# Patient Record
Sex: FEMALE | Race: WHITE | Employment: FULL TIME | URBAN - NONMETROPOLITAN AREA
[De-identification: names, ages, dates, MRNs, and addresses within clinical notes are randomized per-mention and may not be internally consistent; named-entity substitution may affect disease eponyms.]

---

## 2017-04-17 ENCOUNTER — TELEPHONE (OUTPATIENT)
Dept: PRIMARY CARE CLINIC | Age: 52
End: 2017-04-17

## 2017-04-17 RX ORDER — CITALOPRAM 20 MG/1
TABLET ORAL
Qty: 120 TABLET | Refills: 1 | Status: CANCELLED | OUTPATIENT
Start: 2017-04-17

## 2017-04-17 RX ORDER — CITALOPRAM 20 MG/1
TABLET ORAL
Qty: 120 TABLET | Refills: 1 | Status: SHIPPED | OUTPATIENT
Start: 2017-04-17 | End: 2017-08-26 | Stop reason: SDUPTHER

## 2017-04-26 ENCOUNTER — OFFICE VISIT (OUTPATIENT)
Dept: PRIMARY CARE CLINIC | Age: 52
End: 2017-04-26
Payer: COMMERCIAL

## 2017-04-26 ENCOUNTER — HOSPITAL ENCOUNTER (OUTPATIENT)
Age: 52
Setting detail: SPECIMEN
Discharge: HOME | End: 2017-04-26
Payer: COMMERCIAL

## 2017-04-26 ENCOUNTER — TELEPHONE (OUTPATIENT)
Dept: PRIMARY CARE CLINIC | Age: 52
End: 2017-04-26

## 2017-04-26 VITALS
RESPIRATION RATE: 18 BRPM | TEMPERATURE: 97.5 F | SYSTOLIC BLOOD PRESSURE: 104 MMHG | OXYGEN SATURATION: 92 % | DIASTOLIC BLOOD PRESSURE: 82 MMHG | HEIGHT: 67 IN | BODY MASS INDEX: 44.1 KG/M2 | WEIGHT: 281 LBS | HEART RATE: 72 BPM

## 2017-04-26 DIAGNOSIS — Z00.00 PHYSICAL EXAM: Primary | ICD-10-CM

## 2017-04-26 DIAGNOSIS — Z13.9 SCREENING: ICD-10-CM

## 2017-04-26 DIAGNOSIS — Z00.00 PHYSICAL EXAM: ICD-10-CM

## 2017-04-26 DIAGNOSIS — Z13.220 SCREENING, LIPID: ICD-10-CM

## 2017-04-26 DIAGNOSIS — L98.8 SKIN LESION OF BREAST: ICD-10-CM

## 2017-04-26 DIAGNOSIS — Z12.39 SCREENING FOR MALIGNANT NEOPLASM OF BREAST: ICD-10-CM

## 2017-04-26 DIAGNOSIS — Z12.4 PAP SMEAR FOR CERVICAL CANCER SCREENING: ICD-10-CM

## 2017-04-26 DIAGNOSIS — E55.9 VITAMIN D DEFICIENCY: ICD-10-CM

## 2017-04-26 LAB
ALBUMIN SERPL-MCNC: 4.1 G/DL (ref 3.5–5.2)
ALP BLD-CCNC: 59 U/L (ref 35–104)
ALT SERPL-CCNC: 59 U/L (ref 5–33)
ANION GAP SERPL CALCULATED.3IONS-SCNC: 16 MMOL/L (ref 7–19)
AST SERPL-CCNC: 50 U/L (ref 5–32)
BILIRUB SERPL-MCNC: 0.6 MG/DL (ref 0.2–1.2)
BUN BLDV-MCNC: 10 MG/DL (ref 6–20)
CALCIUM SERPL-MCNC: 9.1 MG/DL (ref 8.6–10)
CHLORIDE BLD-SCNC: 99 MMOL/L (ref 98–111)
CHOLESTEROL, TOTAL: 151 MG/DL (ref 160–199)
CO2: 23 MMOL/L (ref 22–29)
CREAT SERPL-MCNC: 0.7 MG/DL (ref 0.5–0.9)
GFR NON-AFRICAN AMERICAN: >60
GLOBULIN: 3.6 G/DL
GLUCOSE BLD-MCNC: 152 MG/DL (ref 74–109)
HBA1C MFR BLD: 7.2 %
HCT VFR BLD CALC: 42.3 % (ref 37–47)
HDLC SERPL-MCNC: 55 MG/DL (ref 65–121)
HEMOGLOBIN: 13.6 G/DL (ref 12–16)
LDL CHOLESTEROL CALCULATED: 71 MG/DL
MCH RBC QN AUTO: 27.5 PG (ref 27–31)
MCHC RBC AUTO-ENTMCNC: 32.2 G/DL (ref 33–37)
MCV RBC AUTO: 85.6 FL (ref 81–99)
PDW BLD-RTO: 13.2 % (ref 11.5–14.5)
PLATELET # BLD: 248 K/UL (ref 130–400)
PMV BLD AUTO: 10.6 FL (ref 7.4–10.4)
POTASSIUM SERPL-SCNC: 4 MMOL/L (ref 3.5–5)
RAPID HIV 1&2: NORMAL
RBC # BLD: 4.94 M/UL (ref 4.2–5.4)
SODIUM BLD-SCNC: 138 MMOL/L (ref 136–145)
TOTAL PROTEIN: 7.7 G/DL (ref 6.6–8.7)
TRIGL SERPL-MCNC: 127 MG/DL (ref 150–199)
TSH SERPL DL<=0.05 MIU/L-ACNC: 2.99 UIU/ML (ref 0.27–4.2)
VITAMIN B-12: 310 PG/ML (ref 211–946)
VITAMIN D 25-HYDROXY: 32.3 NG/ML
WBC # BLD: 7.6 K/UL (ref 4.8–10.8)

## 2017-04-26 PROCEDURE — 88142 CYTOPATH C/V THIN LAYER: CPT

## 2017-04-26 PROCEDURE — 99396 PREV VISIT EST AGE 40-64: CPT | Performed by: NURSE PRACTITIONER

## 2017-04-26 RX ORDER — FUROSEMIDE 20 MG/1
10 TABLET ORAL DAILY
Qty: 30 TABLET | Refills: 1 | Status: SHIPPED | OUTPATIENT
Start: 2017-04-26 | End: 2019-05-29 | Stop reason: SDUPTHER

## 2017-04-26 ASSESSMENT — ENCOUNTER SYMPTOMS
ALLERGIC/IMMUNOLOGIC NEGATIVE: 1
RESPIRATORY NEGATIVE: 1
EYES NEGATIVE: 1
GASTROINTESTINAL NEGATIVE: 1

## 2017-05-01 ENCOUNTER — TELEPHONE (OUTPATIENT)
Dept: PRIMARY CARE CLINIC | Age: 52
End: 2017-05-01

## 2017-05-02 ENCOUNTER — TELEPHONE (OUTPATIENT)
Dept: PRIMARY CARE CLINIC | Age: 52
End: 2017-05-02

## 2017-05-02 DIAGNOSIS — Z01.419 WOMEN'S ANNUAL ROUTINE GYNECOLOGICAL EXAMINATION: Primary | ICD-10-CM

## 2017-05-08 ENCOUNTER — HOSPITAL ENCOUNTER (OUTPATIENT)
Dept: MRI IMAGING | Age: 52
Discharge: HOME | End: 2017-05-08
Payer: COMMERCIAL

## 2017-05-08 DIAGNOSIS — M48.02 CERVICAL SPINAL STENOSIS: ICD-10-CM

## 2017-05-08 DIAGNOSIS — M54.2 CERVICALGIA: ICD-10-CM

## 2017-05-08 DIAGNOSIS — M54.12 RADICULOPATHY, CERVICAL: ICD-10-CM

## 2017-05-08 PROCEDURE — 72141 MRI NECK SPINE W/O DYE: CPT

## 2017-06-19 ENCOUNTER — OFFICE VISIT (OUTPATIENT)
Dept: OBGYN | Age: 52
End: 2017-06-19
Payer: COMMERCIAL

## 2017-06-19 VITALS
DIASTOLIC BLOOD PRESSURE: 83 MMHG | WEIGHT: 280 LBS | SYSTOLIC BLOOD PRESSURE: 122 MMHG | HEIGHT: 67 IN | BODY MASS INDEX: 43.95 KG/M2

## 2017-06-19 DIAGNOSIS — Z12.4 SCREENING FOR CERVICAL CANCER: ICD-10-CM

## 2017-06-19 DIAGNOSIS — N92.6 IRREGULAR BLEEDING: ICD-10-CM

## 2017-06-19 DIAGNOSIS — R87.615 ENCOUNTER FOR REPEAT PAP SMEAR DUE TO PREVIOUS INSUFFICIENT CERVICAL CELLS: Primary | ICD-10-CM

## 2017-06-19 DIAGNOSIS — R10.2 PELVIC PAIN IN FEMALE: ICD-10-CM

## 2017-06-19 PROCEDURE — 99214 OFFICE O/P EST MOD 30 MIN: CPT | Performed by: NURSE PRACTITIONER

## 2017-06-19 ASSESSMENT — ENCOUNTER SYMPTOMS
RESPIRATORY NEGATIVE: 1
GASTROINTESTINAL NEGATIVE: 1
EYES NEGATIVE: 1

## 2017-06-28 ENCOUNTER — TELEPHONE (OUTPATIENT)
Dept: OBGYN | Age: 52
End: 2017-06-28

## 2017-07-11 ENCOUNTER — PROCEDURE VISIT (OUTPATIENT)
Dept: OBGYN | Age: 52
End: 2017-07-11
Payer: COMMERCIAL

## 2017-07-11 VITALS
HEART RATE: 103 BPM | DIASTOLIC BLOOD PRESSURE: 84 MMHG | WEIGHT: 281 LBS | TEMPERATURE: 98.2 F | SYSTOLIC BLOOD PRESSURE: 154 MMHG | BODY MASS INDEX: 44.1 KG/M2 | HEIGHT: 67 IN

## 2017-07-11 DIAGNOSIS — R87.810 ASCUS WITH POSITIVE HIGH RISK HPV CERVICAL: Primary | ICD-10-CM

## 2017-07-11 DIAGNOSIS — R10.2 PELVIC PAIN IN FEMALE: ICD-10-CM

## 2017-07-11 DIAGNOSIS — N92.6 IRREGULAR BLEEDING: ICD-10-CM

## 2017-07-11 DIAGNOSIS — R87.610 ASCUS WITH POSITIVE HIGH RISK HPV CERVICAL: Primary | ICD-10-CM

## 2017-07-11 PROCEDURE — 57454 BX/CURETT OF CERVIX W/SCOPE: CPT | Performed by: OBSTETRICS & GYNECOLOGY

## 2017-07-31 ENCOUNTER — TELEPHONE (OUTPATIENT)
Dept: OBGYN | Age: 52
End: 2017-07-31

## 2017-08-08 ENCOUNTER — OFFICE VISIT (OUTPATIENT)
Dept: OBGYN | Age: 52
End: 2017-08-08
Payer: COMMERCIAL

## 2017-08-08 VITALS — BODY MASS INDEX: 42.85 KG/M2 | HEIGHT: 67 IN | WEIGHT: 273 LBS

## 2017-08-08 DIAGNOSIS — N89.8 VAGINAL ODOR: ICD-10-CM

## 2017-08-08 DIAGNOSIS — N89.8 VAGINAL DISCHARGE: Primary | ICD-10-CM

## 2017-08-08 PROCEDURE — 99213 OFFICE O/P EST LOW 20 MIN: CPT | Performed by: NURSE PRACTITIONER

## 2017-08-08 RX ORDER — METRONIDAZOLE 500 MG/1
500 TABLET ORAL 2 TIMES DAILY WITH MEALS
Qty: 14 TABLET | Refills: 0 | Status: SHIPPED | OUTPATIENT
Start: 2017-08-08 | End: 2017-08-15

## 2017-08-08 ASSESSMENT — ENCOUNTER SYMPTOMS
EYES NEGATIVE: 1
RESPIRATORY NEGATIVE: 1
GASTROINTESTINAL NEGATIVE: 1

## 2017-10-12 DIAGNOSIS — I10 ESSENTIAL HYPERTENSION: Primary | ICD-10-CM

## 2017-10-12 RX ORDER — LISINOPRIL AND HYDROCHLOROTHIAZIDE 12.5; 1 MG/1; MG/1
TABLET ORAL
Qty: 90 TABLET | Refills: 3 | Status: SHIPPED | OUTPATIENT
Start: 2017-10-12 | End: 2018-10-29 | Stop reason: DRUGHIGH

## 2017-10-12 NOTE — TELEPHONE ENCOUNTER
From: Mgaalys [place]  Sent: 10/12/2017 4:51 PM CDT  Subject: Medication Renewal Request    Jauna Williamson would like a refill of the following medications:  lisinopril-hydrochlorothiazide (PRINZIDE;ZESTORETIC) 10-12.5 MG per tablet JEN Greer]    Preferred pharmacy: [address] [place] [address] [phone] - f [phone]    Comment:  my last pill was today and I called [facility] and there are no refills, can you please authorize ASAP? thank you much.  Their # is [phone]

## 2018-06-20 ENCOUNTER — OFFICE VISIT (OUTPATIENT)
Dept: OBGYN | Age: 53
End: 2018-06-20
Payer: COMMERCIAL

## 2018-06-20 VITALS
DIASTOLIC BLOOD PRESSURE: 83 MMHG | SYSTOLIC BLOOD PRESSURE: 127 MMHG | WEIGHT: 273 LBS | HEART RATE: 85 BPM | HEIGHT: 67 IN | BODY MASS INDEX: 42.85 KG/M2

## 2018-06-20 DIAGNOSIS — N92.0 MENORRHAGIA WITH REGULAR CYCLE: ICD-10-CM

## 2018-06-20 DIAGNOSIS — Z12.4 SCREENING FOR CERVICAL CANCER: ICD-10-CM

## 2018-06-20 DIAGNOSIS — Z13.9 ENCOUNTER FOR MEDICAL SCREENING EXAMINATION: ICD-10-CM

## 2018-06-20 DIAGNOSIS — Z11.51 SCREENING FOR HPV (HUMAN PAPILLOMAVIRUS): ICD-10-CM

## 2018-06-20 DIAGNOSIS — Z01.419 WELL WOMAN EXAM WITH ROUTINE GYNECOLOGICAL EXAM: Primary | ICD-10-CM

## 2018-06-20 DIAGNOSIS — Z12.31 ENCOUNTER FOR SCREENING MAMMOGRAM FOR BREAST CANCER: ICD-10-CM

## 2018-06-20 PROCEDURE — 99396 PREV VISIT EST AGE 40-64: CPT | Performed by: NURSE PRACTITIONER

## 2018-06-20 RX ORDER — ACETAMINOPHEN AND CODEINE PHOSPHATE 120; 12 MG/5ML; MG/5ML
1 SOLUTION ORAL DAILY
Qty: 28 TABLET | Refills: 5 | Status: SHIPPED | OUTPATIENT
Start: 2018-06-20 | End: 2018-09-27

## 2018-06-20 ASSESSMENT — ENCOUNTER SYMPTOMS
EYES NEGATIVE: 1
GASTROINTESTINAL NEGATIVE: 1
RESPIRATORY NEGATIVE: 1

## 2018-07-02 ENCOUNTER — TELEPHONE (OUTPATIENT)
Dept: OBGYN | Age: 53
End: 2018-07-02

## 2018-09-17 ENCOUNTER — TELEPHONE (OUTPATIENT)
Dept: OBGYN | Age: 53
End: 2018-09-17

## 2018-09-17 RX ORDER — CITALOPRAM 20 MG/1
20 TABLET ORAL DAILY
Qty: 30 TABLET | Refills: 0 | Status: SHIPPED | OUTPATIENT
Start: 2018-09-17 | End: 2018-10-10 | Stop reason: SDUPTHER

## 2018-09-17 NOTE — TELEPHONE ENCOUNTER
Pt scheduled for colpo and one month of Celexa sent in until approval from Dr. Kimberly Carroll if she wants to refill.

## 2018-09-17 NOTE — TELEPHONE ENCOUNTER
I tried calling patient and left VM for patient to call me back. Our office has tried to reach out to her a couple times and the patient never got back with us, since July.

## 2018-09-27 ENCOUNTER — PROCEDURE VISIT (OUTPATIENT)
Dept: OBGYN | Age: 53
End: 2018-09-27
Payer: COMMERCIAL

## 2018-09-27 VITALS
HEIGHT: 67 IN | SYSTOLIC BLOOD PRESSURE: 124 MMHG | WEIGHT: 275 LBS | DIASTOLIC BLOOD PRESSURE: 88 MMHG | BODY MASS INDEX: 43.16 KG/M2

## 2018-09-27 DIAGNOSIS — R87.810 ASCUS WITH POSITIVE HIGH RISK HPV CERVICAL: Primary | ICD-10-CM

## 2018-09-27 DIAGNOSIS — Z01.818 PREPROCEDURAL EXAMINATION: ICD-10-CM

## 2018-09-27 DIAGNOSIS — R87.610 ASCUS WITH POSITIVE HIGH RISK HPV CERVICAL: Primary | ICD-10-CM

## 2018-09-27 LAB
CONTROL: NORMAL
PREGNANCY TEST URINE, POC: NORMAL

## 2018-09-27 PROCEDURE — 81025 URINE PREGNANCY TEST: CPT | Performed by: OBSTETRICS & GYNECOLOGY

## 2018-09-27 PROCEDURE — 57456 ENDOCERV CURETTAGE W/SCOPE: CPT | Performed by: OBSTETRICS & GYNECOLOGY

## 2018-09-27 NOTE — PROGRESS NOTES
Ander Santana is a 47 y.o. who presents for colposcopy. /88   Ht 5' 7\" (1.702 m)   Wt 275 lb (124.7 kg)   LMP 04/23/2018   BMI 43.07 kg/m²       Colposcopy Procedure Note    Indications: Pap smear 1 months ago showed: ASCUS with POSITIVE high risk HPV. The prior pap showed ASCUS with POSITIVE high risk HPV. Prior cervical/vaginal disease: normal exam without visible pathology. Prior cervical treatment: no treatment. Procedure Details   The risks and benefits of the procedure and Written informed consent obtained. Speculum placed in vagina and excellent visualization of cervix achieved, cervix swabbed x 3 with acetic acid solution. Findings:  Cervix: no visible lesions; endocervical curettage performed. Vaginal inspection: normal without visible lesions. Vulvar colposcopy: vulvar colposcopy not performed. Specimens: ECC    Complications: none. Plan:  1. Specimens labelled and sent to Pathology. 2. Will base further treatment on Pathology findings. 3. Nothing in vagina x 48 hrs  4. Will notify of results. I Santiago Beltran, am scribing for and in the presence of Dr. Zander Brandon 7/30/026150:11 AM.    I, Dr. Zander Brandon, personally performed the services described in this documentation as scribed by Santiago Beltran in my presence, and it is both accurate and complete.

## 2018-09-27 NOTE — PATIENT INSTRUCTIONS
sure to make and go to all appointments, and call your doctor if you are having problems. It's also a good idea to know your test results and keep a list of the medicines you take. When should you call for help? Call your doctor now or seek immediate medical care if:    · You have severe vaginal bleeding. This means that you are soaking through your usual pads or tampons each hour for 2 or more hours.     · You have pain that does not get better after you take pain medicine.     · You have signs of infection, such as:  ¨ Increased pain. ¨ Bad-smelling vaginal discharge. ¨ A fever.    Watch closely for any changes in your health, and be sure to contact your doctor if:    · You have questions or concerns. Where can you learn more? Go to https://[employer].Cartour. org and sign in to your Bring[employer] account. Enter M523 in the [employer]list box to learn more about \"Colposcopy: What to Expect at Home. \"     If you do not have an account, please click on the \"Sign Up Now\" link. Current as of: May 12, 2017  Content Version: 11.7  © 9009-3964 [employer]. Care instructions adapted under license by [facility] [place] ([facility]). If you have questions about a medical condition or this instruction, always ask your healthcare professional. [address] any warranty or liability for your use of this information.

## 2018-10-03 ENCOUNTER — TELEPHONE (OUTPATIENT)
Dept: OBGYN | Age: 53
End: 2018-10-03

## 2018-10-10 RX ORDER — CITALOPRAM 20 MG/1
20 TABLET ORAL DAILY
Qty: 90 TABLET | Refills: 3 | Status: SHIPPED | OUTPATIENT
Start: 2018-10-10 | End: 2018-10-29 | Stop reason: SDUPTHER

## 2018-10-26 ENCOUNTER — LAB (OUTPATIENT)
Dept: LAB | Facility: HOSPITAL | Age: 53
End: 2018-10-26

## 2018-10-26 ENCOUNTER — HOSPITAL ENCOUNTER (OUTPATIENT)
Dept: CT IMAGING | Facility: HOSPITAL | Age: 53
Discharge: HOME | End: 2018-10-26
Admitting: NURSE PRACTITIONER

## 2018-10-26 ENCOUNTER — TRANSCRIBE ORDERS (OUTPATIENT)
Dept: ADMINISTRATIVE | Facility: HOSPITAL | Age: 53
End: 2018-10-26

## 2018-10-26 DIAGNOSIS — R42 DIZZINESS AND GIDDINESS: Primary | ICD-10-CM

## 2018-10-26 DIAGNOSIS — R42 DIZZINESS AND GIDDINESS: ICD-10-CM

## 2018-10-26 LAB — GLUCOSE BLD-MCNC: 240 MG/DL (ref 70–100)

## 2018-10-26 PROCEDURE — 36415 COLL VENOUS BLD VENIPUNCTURE: CPT

## 2018-10-26 PROCEDURE — 70450 CT HEAD/BRAIN W/O DYE: CPT

## 2018-10-26 PROCEDURE — 82947 ASSAY GLUCOSE BLOOD QUANT: CPT

## 2018-10-29 ENCOUNTER — TELEPHONE (OUTPATIENT)
Dept: PRIMARY CARE CLINIC | Age: 53
End: 2018-10-29

## 2018-10-29 ENCOUNTER — OFFICE VISIT (OUTPATIENT)
Dept: PRIMARY CARE CLINIC | Age: 53
End: 2018-10-29
Payer: COMMERCIAL

## 2018-10-29 VITALS
HEART RATE: 92 BPM | SYSTOLIC BLOOD PRESSURE: 126 MMHG | HEIGHT: 67 IN | BODY MASS INDEX: 42.38 KG/M2 | WEIGHT: 270 LBS | TEMPERATURE: 98.3 F | OXYGEN SATURATION: 99 % | DIASTOLIC BLOOD PRESSURE: 78 MMHG

## 2018-10-29 DIAGNOSIS — E66.01 MORBID OBESITY WITH BMI OF 40.0-44.9, ADULT (HCC): Chronic | ICD-10-CM

## 2018-10-29 DIAGNOSIS — E11.9 ENCOUNTER FOR DIABETIC FOOT EXAM (HCC): ICD-10-CM

## 2018-10-29 DIAGNOSIS — E11.65 UNCONTROLLED TYPE 2 DIABETES MELLITUS WITH HYPERGLYCEMIA (HCC): Primary | Chronic | ICD-10-CM

## 2018-10-29 DIAGNOSIS — Z13.31 POSITIVE DEPRESSION SCREENING: ICD-10-CM

## 2018-10-29 DIAGNOSIS — G47.33 OSA (OBSTRUCTIVE SLEEP APNEA): Chronic | ICD-10-CM

## 2018-10-29 DIAGNOSIS — Z76.0 MEDICATION REFILL: ICD-10-CM

## 2018-10-29 DIAGNOSIS — Z23 NEED FOR INFLUENZA VACCINATION: ICD-10-CM

## 2018-10-29 DIAGNOSIS — F41.8 DEPRESSION WITH ANXIETY: ICD-10-CM

## 2018-10-29 LAB — HBA1C MFR BLD: 10.6 %

## 2018-10-29 PROCEDURE — G8427 DOCREV CUR MEDS BY ELIG CLIN: HCPCS | Performed by: NURSE PRACTITIONER

## 2018-10-29 PROCEDURE — G8417 CALC BMI ABV UP PARAM F/U: HCPCS | Performed by: NURSE PRACTITIONER

## 2018-10-29 PROCEDURE — G8484 FLU IMMUNIZE NO ADMIN: HCPCS | Performed by: NURSE PRACTITIONER

## 2018-10-29 PROCEDURE — 99214 OFFICE O/P EST MOD 30 MIN: CPT | Performed by: NURSE PRACTITIONER

## 2018-10-29 PROCEDURE — 2022F DILAT RTA XM EVC RTNOPTHY: CPT | Performed by: NURSE PRACTITIONER

## 2018-10-29 PROCEDURE — 3046F HEMOGLOBIN A1C LEVEL >9.0%: CPT | Performed by: NURSE PRACTITIONER

## 2018-10-29 PROCEDURE — 3017F COLORECTAL CA SCREEN DOC REV: CPT | Performed by: NURSE PRACTITIONER

## 2018-10-29 PROCEDURE — 1036F TOBACCO NON-USER: CPT | Performed by: NURSE PRACTITIONER

## 2018-10-29 PROCEDURE — 83036 HEMOGLOBIN GLYCOSYLATED A1C: CPT | Performed by: NURSE PRACTITIONER

## 2018-10-29 PROCEDURE — G8431 POS CLIN DEPRES SCRN F/U DOC: HCPCS | Performed by: NURSE PRACTITIONER

## 2018-10-29 RX ORDER — MECLIZINE HYDROCHLORIDE 25 MG/1
TABLET ORAL
COMMUNITY
End: 2020-05-19

## 2018-10-29 RX ORDER — CITALOPRAM 20 MG/1
20 TABLET ORAL DAILY
Qty: 90 TABLET | Refills: 3 | Status: SHIPPED | OUTPATIENT
Start: 2018-10-29 | End: 2020-01-24 | Stop reason: SDUPTHER

## 2018-10-29 ASSESSMENT — ENCOUNTER SYMPTOMS
COUGH: 0
VOMITING: 0
ABDOMINAL PAIN: 0
CONSTIPATION: 0
COLOR CHANGE: 0
APNEA: 0
ABDOMINAL DISTENTION: 0
CHEST TIGHTNESS: 0
WHEEZING: 0
NAUSEA: 0
SHORTNESS OF BREATH: 0
DIARRHEA: 0
BACK PAIN: 0

## 2018-10-29 ASSESSMENT — PATIENT HEALTH QUESTIONNAIRE - PHQ9
9. THOUGHTS THAT YOU WOULD BE BETTER OFF DEAD, OR OF HURTING YOURSELF: 0
3. TROUBLE FALLING OR STAYING ASLEEP: 3
10. IF YOU CHECKED OFF ANY PROBLEMS, HOW DIFFICULT HAVE THESE PROBLEMS MADE IT FOR YOU TO DO YOUR WORK, TAKE CARE OF THINGS AT HOME, OR GET ALONG WITH OTHER PEOPLE: 1
SUM OF ALL RESPONSES TO PHQ9 QUESTIONS 1 & 2: 1
5. POOR APPETITE OR OVEREATING: 0
4. FEELING TIRED OR HAVING LITTLE ENERGY: 3
SUM OF ALL RESPONSES TO PHQ QUESTIONS 1-9: 7
SUM OF ALL RESPONSES TO PHQ QUESTIONS 1-9: 7
2. FEELING DOWN, DEPRESSED OR HOPELESS: 0
6. FEELING BAD ABOUT YOURSELF - OR THAT YOU ARE A FAILURE OR HAVE LET YOURSELF OR YOUR FAMILY DOWN: 0
1. LITTLE INTEREST OR PLEASURE IN DOING THINGS: 1
7. TROUBLE CONCENTRATING ON THINGS, SUCH AS READING THE NEWSPAPER OR WATCHING TELEVISION: 0
8. MOVING OR SPEAKING SO SLOWLY THAT OTHER PEOPLE COULD HAVE NOTICED. OR THE OPPOSITE, BEING SO FIGETY OR RESTLESS THAT YOU HAVE BEEN MOVING AROUND A LOT MORE THAN USUAL: 0

## 2018-10-29 NOTE — PROGRESS NOTES
10/29/2018     Shakir Georges (:  1965) is a 46 y.o. female, here for evaluation of the following medical concerns:  Chief Complaint   Patient presents with    Diabetes     Possible diabetes per Dr. Laban Barthel office over the weekend. Lab for Glucose at [facility] and not fasting. 240    Insomnia     Patient feels that she may have sleep apnea    Foot Swelling     Feet are swollen most everyday. HPI  Patient presents to the office to follow-up on recent diagnoses of type II diabetes. She was seen at a local urgent care and based on her blood glucose was started on metformin 500 mg daily. Her hemoglobin A1c here in the office today is 10.6. Patient has been taking her metformin is still getting blood glucose readings in the 250 range. I have bumped her metformin but I'm suggesting she start Ozempic with a gradual increase up to 1 mg daily. The patient has been having polyuria and polydipsia and polyphagia has not had any significant weight loss. She does have ongoing fatigue but states that she does snore, wakes up with her heart racing feels like she has not slept when waking in the morning and has other signs of obstructive sleep apnea. I am recommending a home sleep study. Agent does have a glucometer and is testing her blood sugar 3 times a day. She was given information on self management for diabetes and has pending labs that she will have completed to rule out thyroid disorders as well. Review of Systems   Constitutional: Positive for appetite change and fatigue. Negative for fever and unexpected weight change. Eyes: Positive for visual disturbance. She has had some mildly blurred vision   Respiratory: Negative for apnea, cough, chest tightness, shortness of breath and wheezing. Cardiovascular: Negative for chest pain, palpitations and leg swelling. Gastrointestinal: Negative for abdominal distention, abdominal pain, constipation, diarrhea, nausea and vomiting.    Endocrine: influenza vaccination  -     Cancel: INFLUENZA, QUADV, 3 YRS AND OLDER, IM, PF, PREFILL SYR OR SDV, 0.5ML (FLUZONE QUADV, PF)    Positive depression screening  -     Positive Screen for Clinical Depression with a Documented Follow-up Plan     Medication refill    Encounter for diabetic foot exam ([facility] [address]    Other orders  -     POCT glycosylated hemoglobin (Hb A1C)  -     Diabetic Foot Exam  -     Semaglutide 1 MG/DOSE SOPN; Inject 1 mg into the skin once a week  -     metFORMIN (GLUCOPHAGE) 500 MG tablet; Take 2 tablets by mouth 2 times daily (with meals)      ADULT DIABETES GENERAL HOME CARE DISCHARGE INSTRUCTIONS    Remember: YOU are the marks to successfully manage your diabetes      ? USUAL BLOOD GLUCOSE TARGETS:    Before meals: 90-130mg/dL, 2 hrs after meals: 140-180 mg/dL, before bed 110-150 mg/dL      Test your blood glucose every day, ideally before meals and at bedtime or as directed by your health care provider  Take your diabetes medication or insulin as prescribed. It is important to take medication or insulin on time to prevent hypoglycemia (low blood glucose)  Carry some form of carbohydrate (sugar) with you at all times to treat hypoglycemia. Examples include: 3 glucose tablets, ½ cup of juice, ½ cup soda, glucose gel (entire tube or packet), or 5 to 6 LifeSavers. Wear an emergency medical identification bracelet or necklace that indicates you have diabetes  Follow your recommended meal plan. Eat meals and snacks on time to avoid hypoglycemia. If you have further questions about your meal plan, ask your health care provider for a referral to see a registered dietitian  Follow-up with designated providers  Follow-up with your primary care doctor regarding your diabetes control  Ask your health care provider for a referral to a diabetes educator, if you have further questions about taking care of your diabetes   Look at your feet, top and bottom every morning.  If you have any signs of infection, such

## 2018-10-29 NOTE — PROGRESS NOTES
On the basis of positive PHQ-9 screening (PHQ-9 Total Score: 7), the following plan was implemented: false positive screen suspected- will re-evaluate at next visit. Patient will follow-up in 2 month(s) with PCP.

## 2018-10-31 ENCOUNTER — TELEPHONE (OUTPATIENT)
Dept: PRIMARY CARE CLINIC | Age: 53
End: 2018-10-31

## 2018-11-02 NOTE — TELEPHONE ENCOUNTER
Spoke with patient again yesterday afternoon and she was feeling better. Per Jero Vicente, JEN Blood Sugar could be cause of dizziness because it is elevated. Patient voice understanding and agreed. She will call with any further problems.

## 2018-12-07 RX ORDER — LISINOPRIL AND HYDROCHLOROTHIAZIDE 12.5; 1 MG/1; MG/1
TABLET ORAL
Qty: 90 TABLET | Refills: 3 | Status: SHIPPED | OUTPATIENT
Start: 2018-12-07 | End: 2019-05-29 | Stop reason: SDUPTHER

## 2018-12-10 ENCOUNTER — TELEPHONE (OUTPATIENT)
Dept: FAMILY MEDICINE CLINIC | Age: 53
End: 2018-12-10

## 2019-01-09 ENCOUNTER — OFFICE VISIT (OUTPATIENT)
Dept: PRIMARY CARE CLINIC | Age: 54
End: 2019-01-09
Payer: COMMERCIAL

## 2019-01-09 VITALS
DIASTOLIC BLOOD PRESSURE: 70 MMHG | HEIGHT: 67 IN | WEIGHT: 259 LBS | HEART RATE: 80 BPM | OXYGEN SATURATION: 98 % | TEMPERATURE: 98.4 F | SYSTOLIC BLOOD PRESSURE: 122 MMHG | BODY MASS INDEX: 40.65 KG/M2

## 2019-01-09 DIAGNOSIS — E11.9 WELL CONTROLLED DIABETES MELLITUS (HCC): Primary | Chronic | ICD-10-CM

## 2019-01-09 DIAGNOSIS — G47.33 OSA ON CPAP: Chronic | ICD-10-CM

## 2019-01-09 DIAGNOSIS — Z13.220 SCREENING CHOLESTEROL LEVEL: ICD-10-CM

## 2019-01-09 DIAGNOSIS — I10 WELL-CONTROLLED HYPERTENSION: ICD-10-CM

## 2019-01-09 DIAGNOSIS — Z99.89 OSA ON CPAP: Chronic | ICD-10-CM

## 2019-01-09 LAB — HBA1C MFR BLD: 6.9 %

## 2019-01-09 PROCEDURE — 83036 HEMOGLOBIN GLYCOSYLATED A1C: CPT | Performed by: NURSE PRACTITIONER

## 2019-01-09 PROCEDURE — 2022F DILAT RTA XM EVC RTNOPTHY: CPT | Performed by: NURSE PRACTITIONER

## 2019-01-09 PROCEDURE — 3044F HG A1C LEVEL LT 7.0%: CPT | Performed by: NURSE PRACTITIONER

## 2019-01-09 PROCEDURE — 3017F COLORECTAL CA SCREEN DOC REV: CPT | Performed by: NURSE PRACTITIONER

## 2019-01-09 PROCEDURE — G8484 FLU IMMUNIZE NO ADMIN: HCPCS | Performed by: NURSE PRACTITIONER

## 2019-01-09 PROCEDURE — 1036F TOBACCO NON-USER: CPT | Performed by: NURSE PRACTITIONER

## 2019-01-09 PROCEDURE — G8417 CALC BMI ABV UP PARAM F/U: HCPCS | Performed by: NURSE PRACTITIONER

## 2019-01-09 PROCEDURE — G8427 DOCREV CUR MEDS BY ELIG CLIN: HCPCS | Performed by: NURSE PRACTITIONER

## 2019-01-09 PROCEDURE — 99212 OFFICE O/P EST SF 10 MIN: CPT | Performed by: NURSE PRACTITIONER

## 2019-01-12 ASSESSMENT — ENCOUNTER SYMPTOMS
SHORTNESS OF BREATH: 0
APNEA: 0
WHEEZING: 0

## 2019-01-22 DIAGNOSIS — E11.65 UNCONTROLLED TYPE 2 DIABETES MELLITUS WITH HYPERGLYCEMIA (HCC): Chronic | ICD-10-CM

## 2019-04-08 ENCOUNTER — TELEPHONE (OUTPATIENT)
Dept: PRIMARY CARE CLINIC | Age: 54
End: 2019-04-08

## 2019-05-26 DIAGNOSIS — E11.65 UNCONTROLLED TYPE 2 DIABETES MELLITUS WITH HYPERGLYCEMIA (HCC): Chronic | ICD-10-CM

## 2019-05-29 ENCOUNTER — OFFICE VISIT (OUTPATIENT)
Dept: PRIMARY CARE CLINIC | Age: 54
End: 2019-05-29
Payer: COMMERCIAL

## 2019-05-29 VITALS
OXYGEN SATURATION: 98 % | TEMPERATURE: 99 F | WEIGHT: 243 LBS | HEART RATE: 116 BPM | SYSTOLIC BLOOD PRESSURE: 110 MMHG | HEIGHT: 67 IN | DIASTOLIC BLOOD PRESSURE: 72 MMHG | BODY MASS INDEX: 38.14 KG/M2

## 2019-05-29 DIAGNOSIS — R19.7 DIARRHEA, UNSPECIFIED TYPE: Primary | ICD-10-CM

## 2019-05-29 DIAGNOSIS — R19.7 DIARRHEA, UNSPECIFIED TYPE: ICD-10-CM

## 2019-05-29 DIAGNOSIS — E11.9 TYPE 2 DIABETES MELLITUS WITHOUT COMPLICATION, WITHOUT LONG-TERM CURRENT USE OF INSULIN (HCC): ICD-10-CM

## 2019-05-29 DIAGNOSIS — I10 WELL-CONTROLLED HYPERTENSION: ICD-10-CM

## 2019-05-29 LAB
CRYPTOSPORIDIUM ANTIGEN STOOL: NORMAL
GIARDIA ANTIGEN STOOL: NORMAL

## 2019-05-29 PROCEDURE — 99214 OFFICE O/P EST MOD 30 MIN: CPT | Performed by: NURSE PRACTITIONER

## 2019-05-29 PROCEDURE — 1036F TOBACCO NON-USER: CPT | Performed by: NURSE PRACTITIONER

## 2019-05-29 PROCEDURE — 3044F HG A1C LEVEL LT 7.0%: CPT | Performed by: NURSE PRACTITIONER

## 2019-05-29 PROCEDURE — G8417 CALC BMI ABV UP PARAM F/U: HCPCS | Performed by: NURSE PRACTITIONER

## 2019-05-29 PROCEDURE — 83036 HEMOGLOBIN GLYCOSYLATED A1C: CPT | Performed by: NURSE PRACTITIONER

## 2019-05-29 PROCEDURE — G8427 DOCREV CUR MEDS BY ELIG CLIN: HCPCS | Performed by: NURSE PRACTITIONER

## 2019-05-29 PROCEDURE — 3017F COLORECTAL CA SCREEN DOC REV: CPT | Performed by: NURSE PRACTITIONER

## 2019-05-29 PROCEDURE — 2022F DILAT RTA XM EVC RTNOPTHY: CPT | Performed by: NURSE PRACTITIONER

## 2019-05-29 PROCEDURE — 92225 PR SPECIAL EYE EXAM, INITIAL: CPT | Performed by: NURSE PRACTITIONER

## 2019-05-29 RX ORDER — LISINOPRIL AND HYDROCHLOROTHIAZIDE 12.5; 1 MG/1; MG/1
1 TABLET ORAL DAILY
Qty: 90 TABLET | Refills: 5 | Status: SHIPPED | OUTPATIENT
Start: 2019-05-29 | End: 2020-02-14 | Stop reason: SDUPTHER

## 2019-05-29 RX ORDER — FUROSEMIDE 20 MG/1
10 TABLET ORAL DAILY
Qty: 90 TABLET | Refills: 5 | Status: SHIPPED | OUTPATIENT
Start: 2019-05-29 | End: 2019-08-02 | Stop reason: SDUPTHER

## 2019-05-29 NOTE — PROGRESS NOTES
2019     See Peñaloza (:  1965) is a 46 y.o. female, here for evaluation of the following medical concerns:  Chief Complaint   Patient presents with    Diarrhea     Diarrhea is like water x's 3 weeks    GI Problem    Diabetes     insurance will cover Bydureon and Trulicity      HPI  Diabetes Mellitus Type 2: Current symptoms/problems include none. Medication compliance:  compliant all of the time  Diabetic diet compliance:  compliant all of the time,  Weight trend: stable  Current exercise: walks  Quite often no actual pain or exercise program   Home blood sugar records: fasting range: 100-120  Any episodes of hypoglycemia? no  Eye exam current (within one year): no   reports that she has quit smoking. She has never used smokeless tobacco.   Daily Aspirin? Yes  Hypertension  Patient is here for follow-up of elevated blood pressure. She is adherent to a low-salt diet. Blood pressure is well controlled at home. Cardiac symptoms: none. Patient denies chest pain, fatigue and syncope. Cardiovascular risk factors: hypertension and obesity (BMI >= 30 kg/m2). Use of agents associated with hypertension: none. History of target organ damage: none. Lab Results   Component Value Date    LABA1C 6.9 (A) 2019    LABA1C 10.6 (A) 10/29/2018    LABA1C 7.2 (H) 2017     Lab Results   Component Value Date    CREATININE 0.7 2017     Lab Results   Component Value Date    ALT 59 (H) 2017    AST 50 (H) 2017     Lab Results   Component Value Date    CHOL 151 (L) 2017    TRIG 127 (L) 2017    HDL 55 (L) 2017    LDLCALC 71 2017    LDLDIRECT 80 (L) 2016        GI Disorder:  Patient presents for follow-up of diarrhea- acute. Current symptoms include abdominal bloating and diarrhea- on and off several weeks. Symptoms are Waxing and waning since last visit. Patient denies abdominal bloating and flatulence. Current treatment includes: none.   Medication side effects:  not applicable . Recent diagnostic testing: none. Review of Systems   Constitutional: Positive for appetite change and chills. Negative for activity change, fatigue, fever and unexpected weight change. HENT: Negative. Respiratory: Negative for apnea, shortness of breath and wheezing. Cardiovascular: Negative for chest pain, palpitations and leg swelling. Gastrointestinal: Positive for diarrhea. Bloating and cramping sensation   Genitourinary: Negative. Neurological: Negative for dizziness, syncope, weakness and numbness. Prior to Visit Medications    Medication Sig Taking? Authorizing Provider   lisinopril-hydrochlorothiazide (PRINZIDE;ZESTORETIC) 10-12.5 MG per tablet Take 1 tablet by mouth daily Yes JEN Hood   furosemide (LASIX) 20 MG tablet Take 0.5 tablets by mouth daily Yes JEN Hood   exenatide (BYDUREON) 2 MG SRER injection Inject 1 Syringe into the skin once a week for 12 doses Yes JEN Hood   metFORMIN (GLUCOPHAGE) 500 MG tablet TAKE 2 TABLETS BY MOUTH 2 TIMES DAILY (WITH MEALS) Yes JEN Hood   meclizine (ANTIVERT) 25 MG tablet meclizine 25 mg tablet   Take 1 tablet 3 times a day by oral route as needed. Yes Historical Provider, MD   citalopram (CELEXA) 20 MG tablet Take 1 tablet by mouth daily Yes JEN Hood        Social History     Tobacco Use    Smoking status: Former Smoker    Smokeless tobacco: Never Used    Tobacco comment: used to bum cigarettes off people with social drinking events, quit several years ago   Substance Use Topics    Alcohol use: Yes     Comment: social        Vitals:    05/29/19 1606   BP: 110/72   Pulse: 116   Temp: 99 °F (37.2 °C)   SpO2: 98%   Weight: 243 lb (110.2 kg)   Height: 5' 7\" (1.702 m)     Estimated body mass index is 38.06 kg/m² as calculated from the following:    Height as of this encounter: 5' 7\" (1.702 m). Weight as of this encounter: 243 lb (110.2 kg).     Physical Exam   Constitutional: She is oriented to person, place, and time. She appears well-developed and well-nourished. HENT:   Head: Normocephalic and atraumatic. Eyes: Pupils are equal, round, and reactive to light. Neck: Normal range of motion. Neck supple. No JVD present. Cardiovascular: Normal rate, regular rhythm, normal heart sounds and intact distal pulses. Pulmonary/Chest: Effort normal. She has no wheezes. Abdominal: Soft. Bowel sounds are normal. She exhibits no distension and no mass. There is no tenderness. There is no rebound and no guarding. No hernia. Musculoskeletal: Normal range of motion. Neurological: She is alert and oriented to person, place, and time. Skin: Skin is warm and dry. Psychiatric: She has a normal mood and affect. Her behavior is normal.   Nursing note and vitals reviewed. ASSESSMENT/PLAN:    Nilda Pritchard was seen today for diarrhea, gi problem and diabetes. Diagnoses and all orders for this visit:    Diarrhea, unspecified type  -     [address], FNP, Gastroenterology, [place]  -     POCT glycosylated hemoglobin (Hb A1C)  -     H. Pylori Antigen, Stool; Future  -     Ova and parasite screen; Future  -     Occult blood x 1, stool; Future    Well-controlled hypertension  -     lisinopril-hydrochlorothiazide (PRINZIDE;ZESTORETIC) 10-12.5 MG per tablet; Take 1 tablet by mouth daily  -     furosemide (LASIX) 20 MG tablet; Take 0.5 tablets by mouth daily    Type 2 diabetes mellitus without complication, without long-term current use of insulin (HCC)  -     exenatide (BYDUREON) 2 MG SRER injection; Inject 1 Syringe into the skin once a week for 12 doses    Patient has labs that were previously ordered for CBC CMP and lipid panel have advised her to complete those as well. Given that her insurance provider will cover Bydureon better we will change her from her current GLP-1 therapy.  Sample was logged and given to the patient by

## 2019-05-30 ENCOUNTER — TELEPHONE (OUTPATIENT)
Dept: PRIMARY CARE CLINIC | Age: 54
End: 2019-05-30

## 2019-05-30 ASSESSMENT — ENCOUNTER SYMPTOMS
SHORTNESS OF BREATH: 0
APNEA: 0
DIARRHEA: 1
WHEEZING: 0

## 2019-06-01 LAB — H PYLORI ANTIGEN STOOL: NEGATIVE

## 2019-06-10 ENCOUNTER — TELEPHONE (OUTPATIENT)
Dept: PRIMARY CARE CLINIC | Age: 54
End: 2019-06-10

## 2019-06-10 NOTE — TELEPHONE ENCOUNTER
Patient notified via My Chart  Please notify patient of normal results.     HAVE SEVERAL RESULTS PENDING BUT THE 1ST ONE IS NEGATIVE    Associated Results     Result Notes for O&P SCREEN(GIARDIA/CRYPTOSPORIDIUM) #1     Notes recorded by JEN Dan on 6/1/2019 at 4:08 PM CDT  Please notify patient of normal results. ------    Notes recorded by JEN Dan on 5/30/2019 at 10:31 AM CDT  Please notify patient of normal results.    HAVE SEVERAL RESULTS PENDING BUT THE 1ST ONE IS NEGATIVE   O&P SCREEN(GIARDIA/CRYPTOSPORIDIUM) #1   Order: [id number]   Status:  Final result   Visible to patient:  Yes (MyChart)   Specimen Information: Stool        Component 12d ago   Cryptosporidium Ag Negative   Normal Range: Negative       Giardia Ag, Stl Negative   Normal Range: Negative       Resulting Agency [facility]      Narrative   Performed by: [facility]   ORDER#: [id number]                          ORDERED BY: VIBHA NEUMANN  SOURCE: Stool                              COLLECTED:  05/29/19 17:00  ANTIBIOTICS AT [facility]. :                      RECEIVED :  05/29/19 19:00      Specimen Collected: 05/29/19 17:00 Last Resulted: 05/29/19 20:13         Lab Flowsheet       Order Details       View Encounter       Lab and Collection Details       Routing       Result History               Status of Other Orders     Expected    Ova and parasite screen 05/29/19   Occult blood x 1, stool 05/29/19      Completed     H. Pylori Antigen, Stool  06/01/19      Canceled    Blood Occult Stool Screen #1 06/05/19   Reason: Other   Comment: Occult Blood Screen was cancelled on 06/05/2019 at 14:12 by [facility]; Rejected: Inappropriate specimen/collection/transport specimen arrived in cup not on card office was informed it will have to be recollected on 5/29 specimen arrived in cup instead o     Please notify patient of normal results.     Associated Results     Result Notes for H. Pylori Antigen, Stool     Notes recorded by JEN Shirley on 6/1/2019 at 4:08 PM CDT  Please notify patient of normal results. H. Pylori Antigen, Stool   Order: [id number]   Status:  Final result   [facility] patient:  [facility] Dx:  Diarrhea, unspecified type    Ref Range & Units 12d ago   H Pylori Ag, Stool Negative Negative    Comment: Performed by [place] [address], [id number] [facility] [phone]   www. Mukul Lombardo MD - Lab. Director    Resulting Agency  [facility]         Specimen Collected: 05/29/19 17:00 Last Resulted: 06/01/19 00:58         Lab Flowsheet       Order Details       View Encounter       Lab and Collection Details       Routing       Result History               Result Notes for O&P SCREEN(GIARDIA/CRYPTOSPORIDIUM) #1     Notes recorded by JEN Shirley on 6/1/2019 at 4:08 PM CDT  Please notify patient of normal results. ------    Notes recorded by JEN Shirley on 5/30/2019 at 10:31 AM CDT  Please notify patient of normal results.    HAVE SEVERAL RESULTS PENDING BUT THE 1ST ONE IS NEGATIVE   O&P SCREEN(GIARDIA/CRYPTOSPORIDIUM) #1   Order: [id number]   Status:  Final result   Visible to patient:  Yes ([facility])   Specimen Information: Stool        Component 12d ago   Cryptosporidium Ag Negative   Normal Range: Negative       Giardia Ag, Stl Negative   Normal Range: Negative       Resulting Agency [facility]      Narrative   Performed by: [address] [place] [address] Lab   ORDER#: [id number]                          ORDERED BY: VIBHA NEUMANN  SOURCE: Stool                              COLLECTED:  05/29/19 17:00  ANTIBIOTICS AT [facility]. :                      RECEIVED :  05/29/19 19:00      Specimen Collected: 05/29/19 17:00 Last Resulted: 05/29/19 20:13         Lab Flowsheet       Order Details       View Encounter       Lab and Collection Details       Routing       Result History               Status of Other Orders     Expected    Ova and parasite screen 05/29/19   Occult blood x 1, stool 05/29/19      Canceled    Blood Occult Stool Screen #1 06/05/19   Reason: Other   Comment: Occult Blood Screen was cancelled on 06/05/2019 at 14:12 by [facility]; Rejected:  Inappropriate specimen/collection/transport specimen arrived in cup not on card office was informed it will have to be recollected on 5/29 specimen arrived in cup instead o

## 2019-06-21 ENCOUNTER — OFFICE VISIT (OUTPATIENT)
Dept: OBGYN | Age: 54
End: 2019-06-21
Payer: COMMERCIAL

## 2019-06-21 VITALS
SYSTOLIC BLOOD PRESSURE: 133 MMHG | HEART RATE: 80 BPM | BODY MASS INDEX: 38.61 KG/M2 | DIASTOLIC BLOOD PRESSURE: 90 MMHG | HEIGHT: 67 IN | WEIGHT: 246 LBS

## 2019-06-21 DIAGNOSIS — Z12.4 SCREENING FOR CERVICAL CANCER: ICD-10-CM

## 2019-06-21 DIAGNOSIS — Z13.9 ENCOUNTER FOR MEDICAL SCREENING EXAMINATION: ICD-10-CM

## 2019-06-21 DIAGNOSIS — Z01.419 WELL WOMAN EXAM WITH ROUTINE GYNECOLOGICAL EXAM: Primary | ICD-10-CM

## 2019-06-21 DIAGNOSIS — Z11.51 SCREENING FOR HPV (HUMAN PAPILLOMAVIRUS): ICD-10-CM

## 2019-06-21 DIAGNOSIS — Z12.31 ENCOUNTER FOR SCREENING MAMMOGRAM FOR BREAST CANCER: ICD-10-CM

## 2019-06-21 LAB
ALBUMIN SERPL-MCNC: 4.2 G/DL (ref 3.5–5.2)
ALP BLD-CCNC: 63 U/L (ref 35–104)
ALT SERPL-CCNC: 57 U/L (ref 5–33)
ANION GAP SERPL CALCULATED.3IONS-SCNC: 14 MMOL/L (ref 7–19)
AST SERPL-CCNC: 48 U/L (ref 5–32)
BASOPHILS ABSOLUTE: 0.1 K/UL (ref 0–0.2)
BASOPHILS RELATIVE PERCENT: 0.7 % (ref 0–1)
BILIRUB SERPL-MCNC: 0.6 MG/DL (ref 0.2–1.2)
BUN BLDV-MCNC: 9 MG/DL (ref 6–20)
CALCIUM SERPL-MCNC: 9.3 MG/DL (ref 8.6–10)
CHLORIDE BLD-SCNC: 98 MMOL/L (ref 98–111)
CHOLESTEROL, TOTAL: 137 MG/DL (ref 160–199)
CO2: 25 MMOL/L (ref 22–29)
CREAT SERPL-MCNC: 0.6 MG/DL (ref 0.5–0.9)
EOSINOPHILS ABSOLUTE: 0.1 K/UL (ref 0–0.6)
EOSINOPHILS RELATIVE PERCENT: 1 % (ref 0–5)
GFR NON-AFRICAN AMERICAN: >60
GLUCOSE BLD-MCNC: 171 MG/DL (ref 74–109)
HCT VFR BLD CALC: 44.2 % (ref 37–47)
HDLC SERPL-MCNC: 50 MG/DL (ref 65–121)
HEMOGLOBIN: 14.2 G/DL (ref 12–16)
LDL CHOLESTEROL CALCULATED: 67 MG/DL
LYMPHOCYTES ABSOLUTE: 2 K/UL (ref 1.1–4.5)
LYMPHOCYTES RELATIVE PERCENT: 30.1 % (ref 20–40)
MCH RBC QN AUTO: 27.2 PG (ref 27–31)
MCHC RBC AUTO-ENTMCNC: 32.1 G/DL (ref 33–37)
MCV RBC AUTO: 84.7 FL (ref 81–99)
MONOCYTES ABSOLUTE: 0.5 K/UL (ref 0–0.9)
MONOCYTES RELATIVE PERCENT: 6.6 % (ref 0–10)
NEUTROPHILS ABSOLUTE: 4.2 K/UL (ref 1.5–7.5)
NEUTROPHILS RELATIVE PERCENT: 61.3 % (ref 50–65)
PDW BLD-RTO: 13.6 % (ref 11.5–14.5)
PLATELET # BLD: 229 K/UL (ref 130–400)
PMV BLD AUTO: 10.3 FL (ref 9.4–12.3)
POTASSIUM SERPL-SCNC: 3.9 MMOL/L (ref 3.5–5)
RBC # BLD: 5.22 M/UL (ref 4.2–5.4)
SODIUM BLD-SCNC: 137 MMOL/L (ref 136–145)
T4 FREE: 1.1 NG/DL (ref 0.9–1.7)
TOTAL PROTEIN: 8.1 G/DL (ref 6.6–8.7)
TRIGL SERPL-MCNC: 99 MG/DL (ref 0–149)
TSH SERPL DL<=0.05 MIU/L-ACNC: 2.41 UIU/ML (ref 0.27–4.2)
WBC # BLD: 6.8 K/UL (ref 4.8–10.8)

## 2019-06-21 PROCEDURE — 99396 PREV VISIT EST AGE 40-64: CPT | Performed by: NURSE PRACTITIONER

## 2019-06-21 ASSESSMENT — ENCOUNTER SYMPTOMS
RESPIRATORY NEGATIVE: 1
EYES NEGATIVE: 1
GASTROINTESTINAL NEGATIVE: 1

## 2019-06-21 NOTE — PROGRESS NOTES
Emilia Read is a 46 y.o. female who presents today for her medical conditions/ complaints as noted below. Emilia Read is c/o of Gynecologic Exam        HPI  Pt presents today for pap smear and breast exam.  Needs fasting labs and mammograms. Last pap had colpo. Mammo:2018  Pap smear:  Contraception:perimenopausal     P:2  Ab:0  Bone density:NA  Colonoscopy:  Patient's last menstrual period was 2018.       Past Medical History:   Diagnosis Date    ASCUS of cervix with negative high risk HPV 2017    BV (bacterial vaginosis)     HPV (human papilloma virus) infection     Hypertension     Mitral valve prolapse     Morbid obesity ([address]     Seasonal depression ([address]      Past Surgical History:   Procedure Laterality Date     SECTION      fetal decels/distress, baby was fine    CHOLECYSTECTOMY      lap patsy by Dr. Pj Allen      Maria Ines Delfina  2018    UPPER GASTROINTESTINAL ENDOSCOPY  12    Dr. Josiah Lindquist, negative JOANNA testing     Family History   Problem Relation Age of Onset    Cancer Maternal Grandfather         questionable esophageal cancer     Social History     Tobacco Use    Smoking status: Former Smoker    Smokeless tobacco: Never Used    Tobacco comment: used to bum cigarettes off people with social drinking events, quit several years ago   Substance Use Topics    Alcohol use: Yes     Comment: social       Current Outpatient Medications   Medication Sig Dispense Refill    lisinopril-hydrochlorothiazide (PRINZIDE;ZESTORETIC) 10-12.5 MG per tablet Take 1 tablet by mouth daily 90 tablet 5    furosemide (LASIX) 20 MG tablet Take 0.5 tablets by mouth daily 90 tablet 5    exenatide (BYDUREON) 2 MG SRER injection Inject 1 Syringe into the skin once a week for 12 doses 4 each 3    metFORMIN (GLUCOPHAGE) 500 MG tablet TAKE 2 TABLETS BY MOUTH 2 TIMES DAILY (WITH MEALS) 60 tablet 3    meclizine (ANTIVERT) 25 MG tablet meclizine 25 mg tablet   Take 1 tablet 3 times a day by oral route as needed.  citalopram (CELEXA) 20 MG tablet Take 1 tablet by mouth daily 90 tablet 3     No current facility-administered medications for this visit. Allergies   Allergen Reactions    Amoxil [Amoxicillin Trihydrate] Rash     Vitals:    06/21/19 0841   BP: (!) 133/90   Pulse: 80     Body mass index is 38.53 kg/m². Review of Systems   Constitutional: Negative. HENT: Negative. Eyes: Negative. Respiratory: Negative. Cardiovascular: Negative. Gastrointestinal: Negative. Genitourinary: Negative for difficulty urinating, dyspareunia, dysuria, enuresis, frequency, hematuria, menstrual problem, pelvic pain, urgency and vaginal discharge. Musculoskeletal: Negative. Skin: Negative. Neurological: Negative. Psychiatric/Behavioral: Negative. Physical Exam   Constitutional: She is oriented to person, place, and time. She appears well-developed and well-nourished. No distress. HENT:   Head: Normocephalic and atraumatic. Right Ear: External ear normal.   Left Ear: External ear normal.   Nose: Nose normal.   Eyes: Pupils are equal, round, and reactive to light. Conjunctivae and lids are normal. Right eye exhibits no discharge. Left eye exhibits no discharge. Neck: Normal range of motion. Neck supple. No tracheal deviation present. No thyroid mass and no thyromegaly present. Cardiovascular: Normal rate, regular rhythm and normal heart sounds. No murmur heard. Pulmonary/Chest: Effort normal and breath sounds normal. She has no wheezes. Right breast exhibits no inverted nipple, no mass, no nipple discharge, no skin change and no tenderness. Left breast exhibits no inverted nipple, no mass, no nipple discharge, no skin change and no tenderness. No breast tenderness or discharge. Breasts are symmetrical.   Abdominal: Soft. Bowel sounds are normal. She exhibits no distension and no mass.  There is no on a day of the month that is easy to remember. · To examine your breasts:  ? Remove all your clothes above the waist and lie down. When you are lying down, your breast tissue spreads evenly over your chest wall, which makes it easier to feel all your breast tissue. ? Use the pads--not the fingertips--of the 3 middle fingers of your left hand to check your right breast. Move your fingers slowly in small coin-sized circles that overlap. ? Use three levels of pressure to feel of all your breast tissue. Use light pressure to feel the tissue close to the skin surface. Use medium pressure to feel a little deeper. Use firm pressure to feel your tissue close to your breastbone and ribs. Use each pressure level to feel your breast tissue before moving on to the next spot. ? Check your entire breast, moving up and down as if following a strip from the collarbone to the bra line, and from the armpit to the ribs. Repeat until you have covered the entire breast.  ? Repeat this procedure for your left breast, using the pads of the 3 middle fingers of your right hand. · To examine your breasts while in the shower:  ? Place one arm over your head and lightly soap your breast on that side. ? Using the pads of your fingers, gently move your hand over your breast (in the strip pattern described above), feeling carefully for any lumps or changes. ? Repeat for the other breast.  · Have your doctor inspect anything you notice to see if you need further testing. Where can you learn more? Go to [email or web address] and sign in to your [employer] account. Enter P148 in the [facility] box to learn more about \"Breast Self-Exam: Care Instructions. \"     If you do not have an account, please click on the \"Sign Up Now\" link. Current as of: December 19, 2018  Content Version: 12.0  © 0167-2269 Healthwise, Incorporated. Care instructions adapted under license by [facility] ([facility]).  If you have questions about a

## 2019-06-27 LAB
HPV SAMPLE: NORMAL
HPV TYPE 16: NOT DETECTED
HPV TYPE 18: NOT DETECTED
HPV, HIGH RISK OTHER: NOT DETECTED
INTERPRETATION: NORMAL
SOURCE: NORMAL

## 2019-08-02 DIAGNOSIS — I10 WELL-CONTROLLED HYPERTENSION: ICD-10-CM

## 2019-08-02 RX ORDER — FUROSEMIDE 20 MG/1
10 TABLET ORAL DAILY
Qty: 90 TABLET | Refills: 5 | Status: SHIPPED | OUTPATIENT
Start: 2019-08-02 | End: 2020-01-09 | Stop reason: SDUPTHER

## 2019-10-07 ENCOUNTER — TELEPHONE (OUTPATIENT)
Dept: PRIMARY CARE CLINIC | Age: 54
End: 2019-10-07

## 2020-01-09 RX ORDER — FUROSEMIDE 20 MG/1
10 TABLET ORAL DAILY
Qty: 15 TABLET | Refills: 0 | Status: SHIPPED | OUTPATIENT
Start: 2020-01-09 | End: 2020-01-24 | Stop reason: SDUPTHER

## 2020-01-24 ENCOUNTER — OFFICE VISIT (OUTPATIENT)
Dept: PRIMARY CARE CLINIC | Age: 55
End: 2020-01-24
Payer: COMMERCIAL

## 2020-01-24 VITALS
BODY MASS INDEX: 40.97 KG/M2 | TEMPERATURE: 97.8 F | SYSTOLIC BLOOD PRESSURE: 132 MMHG | HEART RATE: 68 BPM | HEIGHT: 67 IN | DIASTOLIC BLOOD PRESSURE: 84 MMHG | WEIGHT: 261 LBS | OXYGEN SATURATION: 96 % | RESPIRATION RATE: 17 BRPM

## 2020-01-24 DIAGNOSIS — E11.9 TYPE 2 DIABETES MELLITUS WITHOUT COMPLICATION, WITHOUT LONG-TERM CURRENT USE OF INSULIN (HCC): ICD-10-CM

## 2020-01-24 LAB
ALBUMIN SERPL-MCNC: 4.3 G/DL (ref 3.5–5.2)
ALP BLD-CCNC: 62 U/L (ref 35–104)
ALT SERPL-CCNC: 38 U/L (ref 5–33)
ANION GAP SERPL CALCULATED.3IONS-SCNC: 13 MMOL/L (ref 7–19)
AST SERPL-CCNC: 37 U/L (ref 5–32)
BASOPHILS ABSOLUTE: 0.1 K/UL (ref 0–0.2)
BASOPHILS RELATIVE PERCENT: 0.8 % (ref 0–1)
BILIRUB SERPL-MCNC: 0.4 MG/DL (ref 0.2–1.2)
BUN BLDV-MCNC: 12 MG/DL (ref 6–20)
CALCIUM SERPL-MCNC: 9.1 MG/DL (ref 8.6–10)
CHLORIDE BLD-SCNC: 99 MMOL/L (ref 98–111)
CO2: 27 MMOL/L (ref 22–29)
CREAT SERPL-MCNC: 0.6 MG/DL (ref 0.5–0.9)
EOSINOPHILS ABSOLUTE: 0.2 K/UL (ref 0–0.6)
EOSINOPHILS RELATIVE PERCENT: 2.1 % (ref 0–5)
GFR NON-AFRICAN AMERICAN: >60
GLUCOSE BLD-MCNC: 190 MG/DL (ref 74–109)
HBA1C MFR BLD: 6.6 %
HCT VFR BLD CALC: 44.8 % (ref 37–47)
HEMOGLOBIN: 14.1 G/DL (ref 12–16)
IMMATURE GRANULOCYTES #: 0 K/UL
LYMPHOCYTES ABSOLUTE: 2.5 K/UL (ref 1.1–4.5)
LYMPHOCYTES RELATIVE PERCENT: 35.5 % (ref 20–40)
MCH RBC QN AUTO: 27 PG (ref 27–31)
MCHC RBC AUTO-ENTMCNC: 31.5 G/DL (ref 33–37)
MCV RBC AUTO: 85.7 FL (ref 81–99)
MONOCYTES ABSOLUTE: 0.5 K/UL (ref 0–0.9)
MONOCYTES RELATIVE PERCENT: 6.7 % (ref 0–10)
NEUTROPHILS ABSOLUTE: 3.9 K/UL (ref 1.5–7.5)
NEUTROPHILS RELATIVE PERCENT: 54.6 % (ref 50–65)
PDW BLD-RTO: 13.4 % (ref 11.5–14.5)
PLATELET # BLD: 251 K/UL (ref 130–400)
PMV BLD AUTO: 10.4 FL (ref 9.4–12.3)
POTASSIUM SERPL-SCNC: 4.2 MMOL/L (ref 3.5–5)
RBC # BLD: 5.23 M/UL (ref 4.2–5.4)
SODIUM BLD-SCNC: 139 MMOL/L (ref 136–145)
TOTAL PROTEIN: 8.2 G/DL (ref 6.6–8.7)
WBC # BLD: 7.1 K/UL (ref 4.8–10.8)

## 2020-01-24 PROCEDURE — 99214 OFFICE O/P EST MOD 30 MIN: CPT | Performed by: NURSE PRACTITIONER

## 2020-01-24 PROCEDURE — G8484 FLU IMMUNIZE NO ADMIN: HCPCS | Performed by: NURSE PRACTITIONER

## 2020-01-24 PROCEDURE — 3044F HG A1C LEVEL LT 7.0%: CPT | Performed by: NURSE PRACTITIONER

## 2020-01-24 PROCEDURE — 2022F DILAT RTA XM EVC RTNOPTHY: CPT | Performed by: NURSE PRACTITIONER

## 2020-01-24 PROCEDURE — G8417 CALC BMI ABV UP PARAM F/U: HCPCS | Performed by: NURSE PRACTITIONER

## 2020-01-24 PROCEDURE — 83036 HEMOGLOBIN GLYCOSYLATED A1C: CPT | Performed by: NURSE PRACTITIONER

## 2020-01-24 PROCEDURE — 3017F COLORECTAL CA SCREEN DOC REV: CPT | Performed by: NURSE PRACTITIONER

## 2020-01-24 PROCEDURE — G8427 DOCREV CUR MEDS BY ELIG CLIN: HCPCS | Performed by: NURSE PRACTITIONER

## 2020-01-24 PROCEDURE — 1036F TOBACCO NON-USER: CPT | Performed by: NURSE PRACTITIONER

## 2020-01-24 RX ORDER — CITALOPRAM 20 MG/1
20 TABLET ORAL DAILY
Qty: 90 TABLET | Refills: 3 | Status: SHIPPED | OUTPATIENT
Start: 2020-01-24 | End: 2021-02-21

## 2020-01-24 RX ORDER — FUROSEMIDE 20 MG/1
20 TABLET ORAL DAILY
Qty: 30 TABLET | Refills: 0 | Status: SHIPPED | OUTPATIENT
Start: 2020-01-24 | End: 2020-02-14 | Stop reason: SDUPTHER

## 2020-01-24 ASSESSMENT — ENCOUNTER SYMPTOMS
GASTROINTESTINAL NEGATIVE: 1
EYES NEGATIVE: 1
RESPIRATORY NEGATIVE: 1

## 2020-01-24 NOTE — PROGRESS NOTES
1 tablet by mouth daily 30 tablet 0    exenatide (BYDUREON) 2 MG SRER injection Inject 1 Syringe into the skin once a week for 12 doses 1 each 2    metFORMIN (GLUCOPHAGE) 500 MG tablet TAKE 2 TABLETS BY MOUTH 2 TIMES DAILY (WITH MEALS) 60 tablet 3    lisinopril-hydrochlorothiazide (PRINZIDE;ZESTORETIC) 10-12.5 MG per tablet Take 1 tablet by mouth daily 90 tablet 5    meclizine (ANTIVERT) 25 MG tablet meclizine 25 mg tablet   Take 1 tablet 3 times a day by oral route as needed. No current facility-administered medications for this visit. Allergies   Allergen Reactions    Amoxil [Amoxicillin Trihydrate] Rash       Family History   Problem Relation Age of Onset    Cancer Maternal Grandfather         questionable esophageal cancer           Subjective:      Review of Systems   Constitutional: Negative. HENT: Negative. Eyes: Negative. Respiratory: Negative. Cardiovascular: Negative. Gastrointestinal: Negative. Endocrine: Negative. Genitourinary: Negative. Musculoskeletal: Negative. Skin: Negative. Neurological: Negative. Hematological: Negative. Psychiatric/Behavioral: Negative. Objective:     Physical Exam  Vitals signs and nursing note reviewed. Constitutional:       Appearance: She is well-developed. HENT:      Head: Normocephalic and atraumatic. Eyes:      Pupils: Pupils are equal, round, and reactive to light. Neck:      Musculoskeletal: Normal range of motion and neck supple. Vascular: No JVD. Cardiovascular:      Rate and Rhythm: Normal rate and regular rhythm. Heart sounds: Normal heart sounds. Pulmonary:      Effort: Pulmonary effort is normal.      Breath sounds: No wheezing. Abdominal:      General: Bowel sounds are normal. There is no distension. Palpations: Abdomen is soft. There is no mass. Tenderness: There is no abdominal tenderness. There is no guarding or rebound. Hernia: No hernia is present.

## 2020-02-14 RX ORDER — FUROSEMIDE 20 MG/1
20 TABLET ORAL DAILY
Qty: 30 TABLET | Refills: 0 | Status: SHIPPED | OUTPATIENT
Start: 2020-02-14 | End: 2020-04-09

## 2020-02-14 RX ORDER — LISINOPRIL AND HYDROCHLOROTHIAZIDE 12.5; 1 MG/1; MG/1
1 TABLET ORAL DAILY
Qty: 90 TABLET | Refills: 0 | Status: SHIPPED | OUTPATIENT
Start: 2020-02-14 | End: 2020-06-12 | Stop reason: SDUPTHER

## 2020-03-20 ENCOUNTER — HOSPITAL ENCOUNTER (OUTPATIENT)
Dept: ULTRASOUND IMAGING | Age: 55
Discharge: HOME | End: 2020-03-20
Payer: COMMERCIAL

## 2020-03-20 ENCOUNTER — OFFICE VISIT (OUTPATIENT)
Dept: OBGYN | Age: 55
End: 2020-03-20
Payer: COMMERCIAL

## 2020-03-20 VITALS
SYSTOLIC BLOOD PRESSURE: 131 MMHG | HEART RATE: 72 BPM | DIASTOLIC BLOOD PRESSURE: 77 MMHG | BODY MASS INDEX: 41.28 KG/M2 | HEIGHT: 67 IN | WEIGHT: 263 LBS

## 2020-03-20 PROCEDURE — 1036F TOBACCO NON-USER: CPT | Performed by: NURSE PRACTITIONER

## 2020-03-20 PROCEDURE — G8427 DOCREV CUR MEDS BY ELIG CLIN: HCPCS | Performed by: NURSE PRACTITIONER

## 2020-03-20 PROCEDURE — 3017F COLORECTAL CA SCREEN DOC REV: CPT | Performed by: NURSE PRACTITIONER

## 2020-03-20 PROCEDURE — G8417 CALC BMI ABV UP PARAM F/U: HCPCS | Performed by: NURSE PRACTITIONER

## 2020-03-20 PROCEDURE — 99214 OFFICE O/P EST MOD 30 MIN: CPT | Performed by: NURSE PRACTITIONER

## 2020-03-20 PROCEDURE — 58100 BIOPSY OF UTERUS LINING: CPT | Performed by: NURSE PRACTITIONER

## 2020-03-20 PROCEDURE — 76830 TRANSVAGINAL US NON-OB: CPT

## 2020-03-20 PROCEDURE — G8484 FLU IMMUNIZE NO ADMIN: HCPCS | Performed by: NURSE PRACTITIONER

## 2020-03-20 ASSESSMENT — ENCOUNTER SYMPTOMS
GASTROINTESTINAL NEGATIVE: 1
EYES NEGATIVE: 1
RESPIRATORY NEGATIVE: 1

## 2020-03-20 NOTE — PROGRESS NOTES
Samantha Horne is a 53 y.o. female who presents today for her medical conditions/ complaints as noted below. Samantha Horne is c/o of Procedure (EMB/ PMB)        HPI  Pt here for PMB. Bled for 2 weeks, hasn't in few days now. US showed 2 left ovarian cysts and thickened endometrium. Needs EMB. Hx of colpo's x 2. Is feeling itchy and irritated vaginally as well. No LMP recorded. Patient is perimenopausal.      Past Medical History:   Diagnosis Date    ASCUS of cervix with negative high risk HPV 2017    BV (bacterial vaginosis)     HPV (human papilloma virus) infection     Hypertension     Mitral valve prolapse     Morbid obesity ([address]     Seasonal depression ([address]      Past Surgical History:   Procedure Laterality Date     SECTION      fetal decels/distress, baby was fine    CHOLECYSTECTOMY      lap patsy by Dr. Cal Ngo      Karen   2018    UPPER GASTROINTESTINAL ENDOSCOPY  12    Dr. Christi Kapoor, negative JOANNA testing     Family History   Problem Relation Age of Onset    Cancer Maternal Grandfather         questionable esophageal cancer     Social History     Tobacco Use    Smoking status: Former Smoker     Packs/day: 0.50     Years: 4.00     Pack years: 2.00    Smokeless tobacco: Never Used    Tobacco comment: used to bum cigarettes off people with social drinking events, quit several years ago   Substance Use Topics    Alcohol use: Yes     Comment: social       Current Outpatient Medications   Medication Sig Dispense Refill    triamcinolone (KENALOG) 0.025 % cream Apply topically 2 times daily.  80 g 1    Crisaborole (EUCRISA) 2 % OINT Apply 1 applicator topically daily 60 g 1    lisinopril-hydrochlorothiazide (PRINZIDE;ZESTORETIC) 10-12.5 MG per tablet Take 1 tablet by mouth daily 90 tablet 0    citalopram (CELEXA) 20 MG tablet Take 1 tablet by mouth daily 90 tablet 3    exenatide (BYDUREON) 2 MG SRER injection Inject 1 Syringe into the skin once a week for 12 doses 1 each 2    metFORMIN (GLUCOPHAGE) 500 MG tablet TAKE 2 TABLETS BY MOUTH 2 TIMES DAILY (WITH MEALS) 60 tablet 3    meclizine (ANTIVERT) 25 MG tablet meclizine 25 mg tablet   Take 1 tablet 3 times a day by oral route as needed.  furosemide (LASIX) 20 MG tablet Take 1 tablet by mouth daily 30 tablet 0     No current facility-administered medications for this visit. Allergies   Allergen Reactions    Amoxil [Amoxicillin Trihydrate] Rash     Vitals:    03/20/20 1317   BP: 131/77   Pulse: 72     Body mass index is 41.19 kg/m². Review of Systems   Constitutional: Negative. HENT: Negative. Eyes: Negative. Respiratory: Negative. Cardiovascular: Negative. Gastrointestinal: Negative. Genitourinary: Positive for vaginal bleeding. Negative for difficulty urinating, dyspareunia, dysuria, enuresis, frequency, hematuria, menstrual problem, pelvic pain, urgency and vaginal discharge. Vaginal itching     Musculoskeletal: Negative. Skin: Negative. Neurological: Negative. Psychiatric/Behavioral: Negative. Physical Exam  Vitals signs and nursing note reviewed. Constitutional:       General: She is not in acute distress. Appearance: She is well-developed. She is not diaphoretic. HENT:      Head: Normocephalic and atraumatic. Eyes:      Conjunctiva/sclera: Conjunctivae normal.      Pupils: Pupils are equal, round, and reactive to light. Neck:      Musculoskeletal: Normal range of motion. Pulmonary:      Effort: Pulmonary effort is normal.   Abdominal:      Tenderness: There is no guarding. Genitourinary:     Comments: diatherix culture obtained  Musculoskeletal: Normal range of motion. Comments: Normal ROM in all 4 extremities; normal gait   Skin:     General: Skin is warm and dry. Neurological:      Mental Status: She is alert and oriented to person, place, and time. Motor: No abnormal muscle tone. Coordination: Coordination normal.   Psychiatric:         Behavior: Behavior normal.     Endometrial Biopsy Procedure Note    Pre-operative Diagnosis: PMB    Post-operative Diagnosis: same    Indications: postmenopausal bleeding    Procedure Details    Urine pregnancy test was not done. The risks (including infection, bleeding, pain, and uterine perforation) and benefits of the procedure were explained to the patient and Written informed consent was obtained. Antibiotic prophylaxis against endocarditis was not indicated. The patient was placed in the dorsal lithotomy position. Bimanual exam showed the uterus to be in the neutral position. A Graves' speculum inserted in the vagina, and the cervix prepped with povidone iodine. Endocervical curettage with a Kevorkian curette was not performed. A sharp tenaculum was applied to the anterior lip of the cervix for stabilization. A sterile uterine sound was used to sound the uterus to a depth of 8.5cm. A Pipelle endometrial aspirator was used to sample the endometrium. Sample was sent for pathologic examination. Condition:  Stable    Complications:  None    Plan:    The patient was advised to call for any fever or for prolonged or severe pain or bleeding. She was advised to use OTC ibuprofen as needed for mild to moderate pain. She was advised to avoid vaginal intercourse for 48 hours or until the bleeding has completely stopped. Attending Physician Documentation:  I was present for or participated in the entire procedure, including opening and closing. Diagnosis Orders   1. PMB (postmenopausal bleeding)  10051 - VA BIOPSY OF UTERUS LINING    Specimen to Pathology Outpatient   2. Left ovarian cyst  US Non OB Transvaginal   3. Thickened endometrium  07349 - VA BIOPSY OF UTERUS LINING    Specimen to Pathology Outpatient   4. Vaginal itching         MEDICATIONS:  No orders of the defined types were placed in this encounter.       ORDERS:  Orders

## 2020-03-25 RX ORDER — METRONIDAZOLE 500 MG/1
500 TABLET ORAL 2 TIMES DAILY WITH MEALS
Qty: 14 TABLET | Refills: 0 | Status: SHIPPED | OUTPATIENT
Start: 2020-03-25 | End: 2020-04-01

## 2020-03-25 RX ORDER — AZITHROMYCIN 500 MG/1
1000 TABLET, FILM COATED ORAL ONCE
Qty: 2 TABLET | Refills: 0 | Status: SHIPPED | OUTPATIENT
Start: 2020-03-25 | End: 2020-03-25

## 2020-03-27 ENCOUNTER — TELEPHONE (OUTPATIENT)
Dept: OBGYN | Age: 55
End: 2020-03-27

## 2020-03-30 ENCOUNTER — TELEPHONE (OUTPATIENT)
Dept: OBGYN | Age: 55
End: 2020-03-30

## 2020-03-30 RX ORDER — MEGESTROL ACETATE 40 MG/1
40 TABLET ORAL DAILY
Qty: 30 TABLET | Refills: 3 | Status: SHIPPED | OUTPATIENT
Start: 2020-03-30 | End: 2020-05-19

## 2020-03-30 NOTE — TELEPHONE ENCOUNTER
Called and informed pt that Dr. Maria Fernanda Olivares is unable to do any surgeries at this time, but she will be scheduled as soon as we are able. Megace 40 mg daily sent to [facility], pt v/u.

## 2020-04-03 RX ORDER — EXENATIDE 2 MG/.65ML
INJECTION, SUSPENSION, EXTENDED RELEASE SUBCUTANEOUS
Qty: 4 PEN | Refills: 2 | Status: SHIPPED | OUTPATIENT
Start: 2020-04-03 | End: 2020-07-07

## 2020-04-13 ENCOUNTER — TELEPHONE (OUTPATIENT)
Dept: OBGYN | Age: 55
End: 2020-04-13

## 2020-04-13 RX ORDER — DOXYCYCLINE HYCLATE 100 MG
100 TABLET ORAL 2 TIMES DAILY
Qty: 14 TABLET | Refills: 0 | Status: SHIPPED | OUTPATIENT
Start: 2020-04-13 | End: 2020-04-20

## 2020-05-06 ENCOUNTER — TELEPHONE (OUTPATIENT)
Dept: OBGYN | Age: 55
End: 2020-05-06

## 2020-05-18 ENCOUNTER — TELEMEDICINE (OUTPATIENT)
Dept: OBGYN | Age: 55
End: 2020-05-18
Payer: COMMERCIAL

## 2020-05-18 PROCEDURE — 3017F COLORECTAL CA SCREEN DOC REV: CPT | Performed by: OBSTETRICS & GYNECOLOGY

## 2020-05-18 PROCEDURE — G8428 CUR MEDS NOT DOCUMENT: HCPCS | Performed by: OBSTETRICS & GYNECOLOGY

## 2020-05-18 PROCEDURE — 99213 OFFICE O/P EST LOW 20 MIN: CPT | Performed by: OBSTETRICS & GYNECOLOGY

## 2020-05-18 RX ORDER — MISOPROSTOL 100 UG/1
100 TABLET ORAL ONCE
Qty: 1 TABLET | Refills: 0 | Status: ON HOLD | OUTPATIENT
Start: 2020-05-18 | End: 2020-05-22 | Stop reason: HOSPADM

## 2020-05-18 RX ORDER — ACETAMINOPHEN AND CODEINE PHOSPHATE 300; 30 MG/1; MG/1
1 TABLET ORAL EVERY 4 HOURS PRN
Qty: 18 TABLET | Refills: 0 | Status: SHIPPED | OUTPATIENT
Start: 2020-05-18 | End: 2020-05-19

## 2020-05-18 RX ORDER — IBUPROFEN 800 MG/1
800 TABLET ORAL EVERY 8 HOURS PRN
Qty: 90 TABLET | Refills: 0 | Status: SHIPPED | OUTPATIENT
Start: 2020-05-18 | End: 2020-05-19

## 2020-05-18 ASSESSMENT — ENCOUNTER SYMPTOMS
ALLERGIC/IMMUNOLOGIC NEGATIVE: 1
GASTROINTESTINAL NEGATIVE: 1
RESPIRATORY NEGATIVE: 1
EYES NEGATIVE: 1

## 2020-05-18 NOTE — PROGRESS NOTES
Vitals/Constitutional/EENT/Resp/CV/GI//MS/Neuro/Skin/Heme-Lymph-Imm. Pursuant to the emergency declaration under the [address] [place] [address] and the [employer] Act, this Virtual Visit was conducted with patient's (and/or legal guardian's) consent, to reduce the patient's risk of exposure to COVID-19 and provide necessary medical care. The patient (and/or legal guardian) has also been advised to contact this office for worsening conditions or problems, and seek emergency medical treatment and/or call 911 if deemed necessary. Patient identification was verified at the start of the visit: Yes    Total time spent on this encounter: 15    Services were provided through a video synchronous discussion virtually to substitute for in-person clinic visit. Patient and provider were located at their individual homes. --Christos Calderon MD on 5/18/2020 at 10:53 AM    An electronic signature was used to authenticate this note.

## 2020-05-19 ENCOUNTER — OFFICE VISIT (OUTPATIENT)
Age: 55
End: 2020-05-19

## 2020-05-19 ENCOUNTER — HOSPITAL ENCOUNTER (OUTPATIENT)
Dept: PREADMISSION TESTING | Age: 55
Discharge: HOME | End: 2020-05-23
Payer: COMMERCIAL

## 2020-05-19 VITALS — WEIGHT: 250 LBS | HEIGHT: 67 IN | BODY MASS INDEX: 39.24 KG/M2

## 2020-05-19 LAB
ANION GAP SERPL CALCULATED.3IONS-SCNC: 13 MMOL/L (ref 7–19)
BASOPHILS ABSOLUTE: 0.1 K/UL (ref 0–0.2)
BASOPHILS RELATIVE PERCENT: 0.8 % (ref 0–1)
BUN BLDV-MCNC: 15 MG/DL (ref 6–20)
CALCIUM SERPL-MCNC: 9.8 MG/DL (ref 8.6–10)
CHLORIDE BLD-SCNC: 99 MMOL/L (ref 98–111)
CO2: 26 MMOL/L (ref 22–29)
CREAT SERPL-MCNC: 0.7 MG/DL (ref 0.5–0.9)
EKG P AXIS: 7 DEGREES
EKG P-R INTERVAL: 142 MS
EKG Q-T INTERVAL: 392 MS
EKG QRS DURATION: 80 MS
EKG QTC CALCULATION (BAZETT): 420 MS
EKG T AXIS: 4 DEGREES
EOSINOPHILS ABSOLUTE: 0.1 K/UL (ref 0–0.6)
EOSINOPHILS RELATIVE PERCENT: 1.6 % (ref 0–5)
FOLLICLE STIMULATING HORMONE: 16 MIU/ML
GFR NON-AFRICAN AMERICAN: >60
GLUCOSE BLD-MCNC: 168 MG/DL (ref 74–109)
HCT VFR BLD CALC: 43.6 % (ref 37–47)
HEMOGLOBIN: 14.2 G/DL (ref 12–16)
IMMATURE GRANULOCYTES #: 0 K/UL
LYMPHOCYTES ABSOLUTE: 2.3 K/UL (ref 1.1–4.5)
LYMPHOCYTES RELATIVE PERCENT: 35.3 % (ref 20–40)
MCH RBC QN AUTO: 27 PG (ref 27–31)
MCHC RBC AUTO-ENTMCNC: 32.6 G/DL (ref 33–37)
MCV RBC AUTO: 82.9 FL (ref 81–99)
MONOCYTES ABSOLUTE: 0.4 K/UL (ref 0–0.9)
MONOCYTES RELATIVE PERCENT: 6 % (ref 0–10)
NEUTROPHILS ABSOLUTE: 3.6 K/UL (ref 1.5–7.5)
NEUTROPHILS RELATIVE PERCENT: 56 % (ref 50–65)
PDW BLD-RTO: 13.5 % (ref 11.5–14.5)
PLATELET # BLD: 262 K/UL (ref 130–400)
PMV BLD AUTO: 11 FL (ref 9.4–12.3)
POTASSIUM SERPL-SCNC: 4.6 MMOL/L (ref 3.5–5)
RBC # BLD: 5.26 M/UL (ref 4.2–5.4)
SODIUM BLD-SCNC: 138 MMOL/L (ref 136–145)
WBC # BLD: 6.5 K/UL (ref 4.8–10.8)

## 2020-05-19 PROCEDURE — 80048 BASIC METABOLIC PNL TOTAL CA: CPT

## 2020-05-19 PROCEDURE — 93005 ELECTROCARDIOGRAM TRACING: CPT | Performed by: OBSTETRICS & GYNECOLOGY

## 2020-05-19 PROCEDURE — 85025 COMPLETE CBC W/AUTO DIFF WBC: CPT

## 2020-05-19 PROCEDURE — 93010 ELECTROCARDIOGRAM REPORT: CPT | Performed by: INTERNAL MEDICINE

## 2020-05-19 PROCEDURE — 83001 ASSAY OF GONADOTROPIN (FSH): CPT

## 2020-05-19 NOTE — PROGRESS NOTES
2020    Maria De Jesus Lucas (:  1965) is a 53 y.o. female, here requesting COVID-19 testing    History of Present Illness  NONE    Here for PRE OP COVID 19 Testing. Temperature 97.8; O2 Sat 96%. There were no vitals filed for this visit. ASSESSMENT  Screening for COVID-19/ Viral disease    PLAN  POCT influenza testing Not Indicated  COVID-19 sample collected and submitted  Patient given detailed CDC instructions contained within After Visit Summary       An  electronic signature was used to authenticate this note.     --JEN Cheng NP on 2020 at 10:26 AM

## 2020-05-19 NOTE — PATIENT INSTRUCTIONS
Preventing the Spread of Coronavirus Disease 2019 in Homes and Residential Communities   For the most recent information go to [email or web address]    Prevention steps for People with confirmed or suspected COVID-19 (including persons under investigation) who do not need to be hospitalized  and   People with confirmed COVID-19 who were hospitalized and determined to be medically stable to go home    Your healthcare provider and public health staff will evaluate whether you can be cared for at home. If it is determined that you do not need to be hospitalized and can be isolated at home, you will be monitored by staff from your local or state health department. You should follow the prevention steps below until a healthcare provider or local or state health department says you can return to your normal activities. Stay home except to get medical care  People who are mildly ill with COVID-19 are able to isolate at home during their illness. You should restrict activities outside your home, except for getting medical care. Do not go to work, school, or public areas. Avoid using public transportation, ride-sharing, or taxis. Separate yourself from other people and animals in your home  People: As much as possible, you should stay in a specific room and away from other people in your home. Also, you should use a separate bathroom, if available. Animals: You should restrict contact with pets and other animals while you are sick with COVID-19, just like you would around other people. Although there have not been reports of pets or other animals becoming sick with COVID-19, it is still recommended that people sick with COVID-19 limit contact with animals until more information is known about the virus. When possible, have another member of your household care for your animals while you are sick.  If you are sick with COVID-19, avoid contact with your pet, including petting, snuggling, being kissed or licked, and sharing food. If you must care for your pet or be around animals while you are sick, wash your hands before and after you interact with pets and wear a facemask. Call ahead before visiting your doctor  If you have a medical appointment, call the healthcare provider and tell them that you have or may have COVID-19. This will help the healthcare providers office take steps to keep other people from getting infected or exposed. Wear a facemask  You should wear a facemask when you are around other people (e.g., sharing a room or vehicle) or pets and before you enter a healthcare providers office. If you are not able to wear a facemask (for example, because it causes trouble breathing), then people who live with you should not stay in the same room with you, or they should wear a facemask if they enter your room. Cover your coughs and sneezes  Cover your mouth and nose with a tissue when you cough or sneeze. Throw used tissues in a lined trash can. Immediately wash your hands with soap and water for at least 20 seconds or, if soap and water are not available, clean your hands with an alcohol-based hand  that contains at least 60% alcohol. Clean your hands often  Wash your hands often with soap and water for at least 20 seconds, especially after blowing your nose, coughing, or sneezing; going to the bathroom; and before eating or preparing food. If soap and water are not readily available, use an alcohol-based hand  with at least 60% alcohol, covering all surfaces of your hands and rubbing them together until they feel dry. Soap and water are the best option if hands are visibly dirty. Avoid touching your eyes, nose, and mouth with unwashed hands. Avoid sharing personal household items  You should not share dishes, drinking glasses, cups, eating utensils, towels, or bedding with other people or pets in your home.  After using these items, they should

## 2020-05-20 LAB
REPORT: NORMAL
SARS-COV-2: NOT DETECTED
THIS TEST SENT TO: NORMAL

## 2020-05-21 ENCOUNTER — TELEPHONE (OUTPATIENT)
Dept: OBGYN | Age: 55
End: 2020-05-21

## 2020-05-22 ENCOUNTER — HOSPITAL ENCOUNTER (OUTPATIENT)
Age: 55
Setting detail: OUTPATIENT SURGERY
Discharge: HOME | End: 2020-05-22
Attending: OBSTETRICS & GYNECOLOGY | Admitting: OBSTETRICS & GYNECOLOGY
Payer: COMMERCIAL

## 2020-05-22 ENCOUNTER — ANESTHESIA EVENT (OUTPATIENT)
Dept: OPERATING ROOM | Age: 55
End: 2020-05-22
Payer: COMMERCIAL

## 2020-05-22 ENCOUNTER — ANESTHESIA (OUTPATIENT)
Dept: OPERATING ROOM | Age: 55
End: 2020-05-22
Payer: COMMERCIAL

## 2020-05-22 VITALS
HEIGHT: 67 IN | BODY MASS INDEX: 39.24 KG/M2 | HEART RATE: 73 BPM | TEMPERATURE: 97.8 F | SYSTOLIC BLOOD PRESSURE: 114 MMHG | RESPIRATION RATE: 16 BRPM | WEIGHT: 250 LBS | OXYGEN SATURATION: 97 % | DIASTOLIC BLOOD PRESSURE: 70 MMHG

## 2020-05-22 VITALS
RESPIRATION RATE: 12 BRPM | OXYGEN SATURATION: 96 % | TEMPERATURE: 97 F | SYSTOLIC BLOOD PRESSURE: 163 MMHG | DIASTOLIC BLOOD PRESSURE: 87 MMHG

## 2020-05-22 LAB
GLUCOSE BLD-MCNC: 175 MG/DL (ref 70–99)
HCG(URINE) PREGNANCY TEST: NEGATIVE
PERFORMED ON: ABNORMAL

## 2020-05-22 PROCEDURE — 7100000010 HC PHASE II RECOVERY - FIRST 15 MIN: Performed by: OBSTETRICS & GYNECOLOGY

## 2020-05-22 PROCEDURE — 2580000003 HC RX 258: Performed by: ANESTHESIOLOGY

## 2020-05-22 PROCEDURE — 6370000000 HC RX 637 (ALT 250 FOR IP): Performed by: OBSTETRICS & GYNECOLOGY

## 2020-05-22 PROCEDURE — 88305 TISSUE EXAM BY PATHOLOGIST: CPT

## 2020-05-22 PROCEDURE — 3700000000 HC ANESTHESIA ATTENDED CARE: Performed by: OBSTETRICS & GYNECOLOGY

## 2020-05-22 PROCEDURE — 3600000004 HC SURGERY LEVEL 4 BASE: Performed by: OBSTETRICS & GYNECOLOGY

## 2020-05-22 PROCEDURE — 3600000014 HC SURGERY LEVEL 4 ADDTL 15MIN: Performed by: OBSTETRICS & GYNECOLOGY

## 2020-05-22 PROCEDURE — 82947 ASSAY GLUCOSE BLOOD QUANT: CPT

## 2020-05-22 PROCEDURE — 2709999900 HC NON-CHARGEABLE SUPPLY: Performed by: OBSTETRICS & GYNECOLOGY

## 2020-05-22 PROCEDURE — 2720000010 HC SURG SUPPLY STERILE: Performed by: OBSTETRICS & GYNECOLOGY

## 2020-05-22 PROCEDURE — 58558 HYSTEROSCOPY BIOPSY: CPT | Performed by: OBSTETRICS & GYNECOLOGY

## 2020-05-22 PROCEDURE — 2500000003 HC RX 250 WO HCPCS

## 2020-05-22 PROCEDURE — 3700000001 HC ADD 15 MINUTES (ANESTHESIA): Performed by: OBSTETRICS & GYNECOLOGY

## 2020-05-22 PROCEDURE — 7100000011 HC PHASE II RECOVERY - ADDTL 15 MIN: Performed by: OBSTETRICS & GYNECOLOGY

## 2020-05-22 PROCEDURE — 7100000001 HC PACU RECOVERY - ADDTL 15 MIN: Performed by: OBSTETRICS & GYNECOLOGY

## 2020-05-22 PROCEDURE — 84703 CHORIONIC GONADOTROPIN ASSAY: CPT

## 2020-05-22 PROCEDURE — 6360000002 HC RX W HCPCS

## 2020-05-22 PROCEDURE — 7100000000 HC PACU RECOVERY - FIRST 15 MIN: Performed by: OBSTETRICS & GYNECOLOGY

## 2020-05-22 RX ORDER — ONDANSETRON 2 MG/ML
INJECTION INTRAMUSCULAR; INTRAVENOUS PRN
Status: DISCONTINUED | OUTPATIENT
Start: 2020-05-22 | End: 2020-05-22 | Stop reason: SDUPTHER

## 2020-05-22 RX ORDER — ENALAPRILAT 2.5 MG/2ML
1.25 INJECTION INTRAVENOUS
Status: DISCONTINUED | OUTPATIENT
Start: 2020-05-22 | End: 2020-05-22 | Stop reason: HOSPADM

## 2020-05-22 RX ORDER — KETOROLAC TROMETHAMINE 30 MG/ML
INJECTION, SOLUTION INTRAMUSCULAR; INTRAVENOUS PRN
Status: DISCONTINUED | OUTPATIENT
Start: 2020-05-22 | End: 2020-05-22 | Stop reason: SDUPTHER

## 2020-05-22 RX ORDER — ACETAMINOPHEN AND CODEINE PHOSPHATE 300; 30 MG/1; MG/1
1 TABLET ORAL EVERY 4 HOURS PRN
Qty: 18 TABLET | Refills: 0 | Status: SHIPPED | OUTPATIENT
Start: 2020-05-22 | End: 2020-05-25

## 2020-05-22 RX ORDER — FENTANYL CITRATE 50 UG/ML
50 INJECTION, SOLUTION INTRAMUSCULAR; INTRAVENOUS
Status: DISCONTINUED | OUTPATIENT
Start: 2020-05-22 | End: 2020-05-22 | Stop reason: HOSPADM

## 2020-05-22 RX ORDER — MIDAZOLAM HYDROCHLORIDE 1 MG/ML
2 INJECTION INTRAMUSCULAR; INTRAVENOUS
Status: DISCONTINUED | OUTPATIENT
Start: 2020-05-22 | End: 2020-05-22 | Stop reason: HOSPADM

## 2020-05-22 RX ORDER — METOCLOPRAMIDE HYDROCHLORIDE 5 MG/ML
10 INJECTION INTRAMUSCULAR; INTRAVENOUS
Status: DISCONTINUED | OUTPATIENT
Start: 2020-05-22 | End: 2020-05-22 | Stop reason: HOSPADM

## 2020-05-22 RX ORDER — DEXAMETHASONE SODIUM PHOSPHATE 10 MG/ML
INJECTION, SOLUTION INTRAMUSCULAR; INTRAVENOUS PRN
Status: DISCONTINUED | OUTPATIENT
Start: 2020-05-22 | End: 2020-05-22 | Stop reason: SDUPTHER

## 2020-05-22 RX ORDER — HYDROMORPHONE HYDROCHLORIDE 1 MG/ML
0.25 INJECTION, SOLUTION INTRAMUSCULAR; INTRAVENOUS; SUBCUTANEOUS EVERY 5 MIN PRN
Status: DISCONTINUED | OUTPATIENT
Start: 2020-05-22 | End: 2020-05-22 | Stop reason: HOSPADM

## 2020-05-22 RX ORDER — FENTANYL CITRATE 50 UG/ML
25 INJECTION, SOLUTION INTRAMUSCULAR; INTRAVENOUS
Status: DISCONTINUED | OUTPATIENT
Start: 2020-05-22 | End: 2020-05-22 | Stop reason: HOSPADM

## 2020-05-22 RX ORDER — DIPHENHYDRAMINE HYDROCHLORIDE 50 MG/ML
12.5 INJECTION INTRAMUSCULAR; INTRAVENOUS
Status: DISCONTINUED | OUTPATIENT
Start: 2020-05-22 | End: 2020-05-22 | Stop reason: HOSPADM

## 2020-05-22 RX ORDER — LABETALOL HYDROCHLORIDE 5 MG/ML
5 INJECTION, SOLUTION INTRAVENOUS EVERY 10 MIN PRN
Status: DISCONTINUED | OUTPATIENT
Start: 2020-05-22 | End: 2020-05-22 | Stop reason: HOSPADM

## 2020-05-22 RX ORDER — HYDROMORPHONE HYDROCHLORIDE 1 MG/ML
0.5 INJECTION, SOLUTION INTRAMUSCULAR; INTRAVENOUS; SUBCUTANEOUS EVERY 5 MIN PRN
Status: DISCONTINUED | OUTPATIENT
Start: 2020-05-22 | End: 2020-05-22 | Stop reason: HOSPADM

## 2020-05-22 RX ORDER — LIDOCAINE HYDROCHLORIDE 10 MG/ML
1 INJECTION, SOLUTION EPIDURAL; INFILTRATION; INTRACAUDAL; PERINEURAL
Status: DISCONTINUED | OUTPATIENT
Start: 2020-05-22 | End: 2020-05-22 | Stop reason: HOSPADM

## 2020-05-22 RX ORDER — SODIUM CHLORIDE 9 MG/ML
INJECTION, SOLUTION INTRAVENOUS CONTINUOUS
Status: DISCONTINUED | OUTPATIENT
Start: 2020-05-22 | End: 2020-05-22 | Stop reason: HOSPADM

## 2020-05-22 RX ORDER — MEPERIDINE HYDROCHLORIDE 50 MG/ML
12.5 INJECTION INTRAMUSCULAR; INTRAVENOUS; SUBCUTANEOUS EVERY 5 MIN PRN
Status: DISCONTINUED | OUTPATIENT
Start: 2020-05-22 | End: 2020-05-22 | Stop reason: HOSPADM

## 2020-05-22 RX ORDER — LIDOCAINE HYDROCHLORIDE 10 MG/ML
INJECTION, SOLUTION EPIDURAL; INFILTRATION; INTRACAUDAL; PERINEURAL PRN
Status: DISCONTINUED | OUTPATIENT
Start: 2020-05-22 | End: 2020-05-22 | Stop reason: SDUPTHER

## 2020-05-22 RX ORDER — IBUPROFEN 800 MG/1
800 TABLET ORAL 2 TIMES DAILY PRN
Qty: 60 TABLET | Refills: 0 | Status: SHIPPED | OUTPATIENT
Start: 2020-05-22 | End: 2021-04-28 | Stop reason: CLARIF

## 2020-05-22 RX ORDER — SODIUM CHLORIDE, SODIUM LACTATE, POTASSIUM CHLORIDE, CALCIUM CHLORIDE 600; 310; 30; 20 MG/100ML; MG/100ML; MG/100ML; MG/100ML
INJECTION, SOLUTION INTRAVENOUS CONTINUOUS
Status: DISCONTINUED | OUTPATIENT
Start: 2020-05-22 | End: 2020-05-22 | Stop reason: HOSPADM

## 2020-05-22 RX ORDER — ALBUTEROL SULFATE 2.5 MG/3ML
SOLUTION RESPIRATORY (INHALATION) PRN
Status: DISCONTINUED | OUTPATIENT
Start: 2020-05-22 | End: 2020-05-22 | Stop reason: SDUPTHER

## 2020-05-22 RX ORDER — HYDRALAZINE HYDROCHLORIDE 20 MG/ML
5 INJECTION INTRAMUSCULAR; INTRAVENOUS EVERY 10 MIN PRN
Status: DISCONTINUED | OUTPATIENT
Start: 2020-05-22 | End: 2020-05-22 | Stop reason: HOSPADM

## 2020-05-22 RX ORDER — SODIUM CHLORIDE 0.9 % (FLUSH) 0.9 %
10 SYRINGE (ML) INJECTION EVERY 12 HOURS SCHEDULED
Status: DISCONTINUED | OUTPATIENT
Start: 2020-05-22 | End: 2020-05-22 | Stop reason: HOSPADM

## 2020-05-22 RX ORDER — PROPOFOL 10 MG/ML
INJECTION, EMULSION INTRAVENOUS PRN
Status: DISCONTINUED | OUTPATIENT
Start: 2020-05-22 | End: 2020-05-22 | Stop reason: SDUPTHER

## 2020-05-22 RX ORDER — PROMETHAZINE HYDROCHLORIDE 25 MG/ML
6.25 INJECTION, SOLUTION INTRAMUSCULAR; INTRAVENOUS
Status: DISCONTINUED | OUTPATIENT
Start: 2020-05-22 | End: 2020-05-22 | Stop reason: HOSPADM

## 2020-05-22 RX ORDER — MORPHINE SULFATE 4 MG/ML
4 INJECTION, SOLUTION INTRAMUSCULAR; INTRAVENOUS EVERY 5 MIN PRN
Status: DISCONTINUED | OUTPATIENT
Start: 2020-05-22 | End: 2020-05-22 | Stop reason: HOSPADM

## 2020-05-22 RX ORDER — MIDAZOLAM HYDROCHLORIDE 1 MG/ML
INJECTION INTRAMUSCULAR; INTRAVENOUS PRN
Status: DISCONTINUED | OUTPATIENT
Start: 2020-05-22 | End: 2020-05-22 | Stop reason: SDUPTHER

## 2020-05-22 RX ORDER — SODIUM CHLORIDE 0.9 % (FLUSH) 0.9 %
10 SYRINGE (ML) INJECTION PRN
Status: DISCONTINUED | OUTPATIENT
Start: 2020-05-22 | End: 2020-05-22 | Stop reason: HOSPADM

## 2020-05-22 RX ORDER — MORPHINE SULFATE 4 MG/ML
2 INJECTION, SOLUTION INTRAMUSCULAR; INTRAVENOUS EVERY 5 MIN PRN
Status: DISCONTINUED | OUTPATIENT
Start: 2020-05-22 | End: 2020-05-22 | Stop reason: HOSPADM

## 2020-05-22 RX ADMIN — LIDOCAINE HYDROCHLORIDE 50 MG: 10 INJECTION, SOLUTION EPIDURAL; INFILTRATION; INTRACAUDAL; PERINEURAL at 07:54

## 2020-05-22 RX ADMIN — HYDROMORPHONE HYDROCHLORIDE 0.5 MG: 1 INJECTION, SOLUTION INTRAMUSCULAR; INTRAVENOUS; SUBCUTANEOUS at 08:00

## 2020-05-22 RX ADMIN — KETOROLAC TROMETHAMINE 30 MG: 30 INJECTION, SOLUTION INTRAMUSCULAR; INTRAVENOUS at 08:03

## 2020-05-22 RX ADMIN — PROPOFOL 50 MG: 10 INJECTION, EMULSION INTRAVENOUS at 07:56

## 2020-05-22 RX ADMIN — ALBUTEROL SULFATE 2.5 MG: 2.5 SOLUTION RESPIRATORY (INHALATION) at 08:09

## 2020-05-22 RX ADMIN — DEXAMETHASONE SODIUM PHOSPHATE 10 MG: 10 INJECTION, SOLUTION INTRAMUSCULAR; INTRAVENOUS at 08:00

## 2020-05-22 RX ADMIN — PROPOFOL 150 MG: 10 INJECTION, EMULSION INTRAVENOUS at 07:54

## 2020-05-22 RX ADMIN — HYDROMORPHONE HYDROCHLORIDE 0.25 MG: 1 INJECTION, SOLUTION INTRAMUSCULAR; INTRAVENOUS; SUBCUTANEOUS at 08:13

## 2020-05-22 RX ADMIN — HYDROMORPHONE HYDROCHLORIDE 0.25 MG: 1 INJECTION, SOLUTION INTRAMUSCULAR; INTRAVENOUS; SUBCUTANEOUS at 08:08

## 2020-05-22 RX ADMIN — MIDAZOLAM 2 MG: 1 INJECTION INTRAMUSCULAR; INTRAVENOUS at 07:49

## 2020-05-22 RX ADMIN — ONDANSETRON HYDROCHLORIDE 4 MG: 2 INJECTION, SOLUTION INTRAMUSCULAR; INTRAVENOUS at 08:00

## 2020-05-22 RX ADMIN — SODIUM CHLORIDE, SODIUM LACTATE, POTASSIUM CHLORIDE, AND CALCIUM CHLORIDE: 600; 310; 30; 20 INJECTION, SOLUTION INTRAVENOUS at 07:04

## 2020-05-22 ASSESSMENT — ENCOUNTER SYMPTOMS: SHORTNESS OF BREATH: 0

## 2020-05-22 ASSESSMENT — LIFESTYLE VARIABLES: SMOKING_STATUS: 0

## 2020-05-22 ASSESSMENT — PAIN SCALES - GENERAL
PAINLEVEL_OUTOF10: 0

## 2020-05-22 NOTE — ANESTHESIA PRE PROCEDURE
Department of Anesthesiology  Preprocedure Note       Name:  Alejo Flores   Age:  53 y.o.  :  1965                                          MRN:  [id number]         Date:  2020      Surgeon: Anna Greer):  Bev Luong MD    Procedure: Procedure(s):  EXAM UNDER ANESTHESIA, HYSTEROSCOPY, MYOSURE, DILATION AND CURETTAGE    Medications prior to admission:   Prior to Admission medications    Medication Sig Start Date End Date Taking? Authorizing Provider   furosemide (LASIX) 20 MG tablet Take 1 tablet by mouth daily 4/10/20  Yes JEN Bedolla   Crisaborole (EUCRISA) 2 % OINT Apply 1 applicator topically daily 20  Yes JEN Bedolla   lisinopril-hydrochlorothiazide (PRINZIDE;ZESTORETIC) 10-12.5 MG per tablet Take 1 tablet by mouth daily 20 Yes JEN Bedolla   citalopram (CELEXA) 20 MG tablet Take 1 tablet by mouth daily 20  Yes JEN Bedolla   misoprostol (CYTOTEC) 100 MCG tablet Place 1 tablet vaginally once for 1 dose Night before procedure 20  Maddie Jones MD   BYDUREON 2 MG PEN INJECT 1 SYRINGE INTO THE SKIN ONCE A WEEK FOR 12 DOSES  Patient taking differently: Inject 1 pen as directed once a week  4/3/20   JEN Bedolla       Current medications:    No current facility-administered medications for this encounter. Allergies:     Allergies   Allergen Reactions    Amoxil [Amoxicillin Trihydrate] Rash       Problem List:    Patient Active Problem List   Diagnosis Code    Mitral valve prolapse I34.1    Hypertension I10    Seasonal depression ([facility] F33.8    Abdominal pain R10.9    NAFLD (nonalcoholic fatty liver disease) K76.0    Constipation K59.00    Rectal bleeding K62.5    Excessive gas R14.3    Abdominal bloating R14.0    Family history of esophageal cancer Z80.0    Vitamin D deficiency E55.9       Past Medical History:        Diagnosis Date    ASCUS of cervix with negative high risk HPV 2017    BV (bacterial vaginosis)     HPV (human papilloma virus) infection     Hypertension     Mitral valve prolapse     Morbid [facility] ([facility]     Seasonal depression ([facility]        Past Surgical History:        Procedure Laterality Date     SECTION      fetal decels/distress, baby was fine    CHOLECYSTECTOMY      lap patsy by Dr. Hector Kunz      Karine Race  2018    UPPER GASTROINTESTINAL ENDOSCOPY  12    Dr. Oneal Perez, negative JOANNA testing       Social History:    Social History     Tobacco Use    Smoking status: Former Smoker     Packs/day: 0.50     Years: 4.00     Pack years: 2.00    Smokeless tobacco: Never Used    Tobacco comment: used to bum cigarettes off people with social drinking events, quit several years ago   Substance Use Topics    Alcohol use: Yes     Comment: social                                Counseling given: Not Answered  Comment: used to bum cigarettes off people with social drinking events, quit several years ago      Vital Signs (Current):   Vitals:    20 0621   BP: 137/78   Pulse: 78   Resp: 16   Temp: 98.6 °F (37 °C)   TempSrc: Tympanic   SpO2: 100%   Weight: 250 lb (113.4 kg)   Height: 5' 7\" (1.702 m)                                              BP Readings from Last 3 Encounters:   20 137/78   20 131/77   20 132/84       NPO Status: Time of last liquid consumption:                         Time of last solid consumption:                         Date of last liquid consumption: 20                        Date of last solid food consumption: 20    BMI:   Wt Readings from Last 3 Encounters:   20 250 lb (113.4 kg)   20 250 lb (113.4 kg)   20 263 lb (119.3 kg)     Body mass index is 39.16 kg/m².     CBC:   Lab Results   Component Value Date    WBC 6.5 2020    RBC 5.26 2020    HGB 14.2 2020    HCT 43.6 2020    MCV 82.9 2020    RDW 13.5 2020

## 2020-05-22 NOTE — H&P
HPI  Ben Hendrickson is a 53 y.o. female with h/o PMB who presents for diagnostic hysteroscopy. She is doing well without complaints. Past Medical History:   Diagnosis Date    ASCUS of cervix with negative high risk HPV 2017    BV (bacterial vaginosis)     HPV (human papilloma virus) infection     Hypertension     Mitral valve prolapse     Morbid obesity ([address]     Seasonal depression ([address]      Past Surgical History:   Procedure Laterality Date     SECTION      fetal decels/distress, baby was fine    CHOLECYSTECTOMY      lap patsy by Dr. Rebeca Bhandari      [place]  2018    UPPER GASTROINTESTINAL ENDOSCOPY  12    Dr. Babak Laboy, negative JOANNA testing     Family History   Problem Relation Age of Onset    Cancer Maternal Grandfather         questionable esophageal cancer     Social History     Tobacco Use    Smoking status: Former Smoker     Packs/day: 0.50     Years: 4.00     Pack years: 2.00    Smokeless tobacco: Never Used    Tobacco comment: used to bum cigarettes off people with social drinking events, quit several years ago   Substance Use Topics    Alcohol use: Yes     Comment: social    Drug use: No     No current facility-administered medications on file prior to encounter.       Current Outpatient Medications on File Prior to Encounter   Medication Sig Dispense Refill    furosemide (LASIX) 20 MG tablet Take 1 tablet by mouth daily 30 tablet 3    Crisaborole (EUCRISA) 2 % OINT Apply 1 applicator topically daily 60 g 1    lisinopril-hydrochlorothiazide (PRINZIDE;ZESTORETIC) 10-12.5 MG per tablet Take 1 tablet by mouth daily 90 tablet 0    citalopram (CELEXA) 20 MG tablet Take 1 tablet by mouth daily 90 tablet 3    BYDUREON 2 MG PEN INJECT 1 SYRINGE INTO THE SKIN ONCE A WEEK FOR 12 DOSES (Patient taking differently: Inject 1 pen as directed once a week ) 4 pen 2     Allergies   Allergen Reactions    Amoxil [Amoxicillin Trihydrate] Rash /78   Pulse 78   Temp 98.6 °F (37 °C) (Tympanic)   Resp 16   Ht 5' 7\" (1.702 m)   Wt 250 lb (113.4 kg)   LMP 05/17/2020   SpO2 100%   BMI 39.16 kg/m²   Physical Exam  Constitutional:       General: She is not in acute distress. Appearance: She is well-developed. HENT:      Head: Normocephalic and atraumatic. Eyes:      Conjunctiva/sclera: Conjunctivae normal.      Pupils: Pupils are equal, round, and reactive to light. Neck:      Musculoskeletal: Normal range of motion and neck supple. Cardiovascular:      Rate and Rhythm: Normal rate and regular rhythm. Pulmonary:      Effort: Pulmonary effort is normal. No respiratory distress. Breath sounds: Normal breath sounds. Abdominal:      General: There is no distension. Palpations: Abdomen is soft. Tenderness: There is no abdominal tenderness. Musculoskeletal: Normal range of motion. General: No tenderness. Skin:     General: Skin is warm and dry. Neurological:      Mental Status: She is alert and oriented to person, place, and time. Psychiatric:         Behavior: Behavior normal.         Thought Content: Thought content normal.         Judgment: Judgment normal.           Assessment  To OR for Hysteroscopy, Myosure D&C    Plan  Risks of bleeding, infection, injury to surrounding structures and uterine perforation which may lead to inability to complete the planned procedure was reviewed. Patient states understanding and consent signed.

## 2020-05-26 ENCOUNTER — TELEPHONE (OUTPATIENT)
Dept: OBGYN | Age: 55
End: 2020-05-26

## 2020-05-27 ENCOUNTER — OFFICE VISIT (OUTPATIENT)
Dept: OBGYN | Age: 55
End: 2020-05-27

## 2020-05-27 ENCOUNTER — TELEPHONE (OUTPATIENT)
Dept: OBGYN | Age: 55
End: 2020-05-27

## 2020-05-27 VITALS
BODY MASS INDEX: 39.87 KG/M2 | HEIGHT: 67 IN | WEIGHT: 254 LBS | DIASTOLIC BLOOD PRESSURE: 82 MMHG | SYSTOLIC BLOOD PRESSURE: 112 MMHG

## 2020-05-27 DIAGNOSIS — N93.9 VAGINAL BLEEDING: ICD-10-CM

## 2020-05-27 LAB
BASOPHILS ABSOLUTE: 0.1 K/UL (ref 0–0.2)
BASOPHILS RELATIVE PERCENT: 0.7 % (ref 0–1)
EOSINOPHILS ABSOLUTE: 0.1 K/UL (ref 0–0.6)
EOSINOPHILS RELATIVE PERCENT: 1.3 % (ref 0–5)
HCT VFR BLD CALC: 43.1 % (ref 37–47)
HEMOGLOBIN: 14 G/DL (ref 12–16)
IMMATURE GRANULOCYTES #: 0 K/UL
LYMPHOCYTES ABSOLUTE: 3 K/UL (ref 1.1–4.5)
LYMPHOCYTES RELATIVE PERCENT: 34.8 % (ref 20–40)
MCH RBC QN AUTO: 26.5 PG (ref 27–31)
MCHC RBC AUTO-ENTMCNC: 32.5 G/DL (ref 33–37)
MCV RBC AUTO: 81.6 FL (ref 81–99)
MONOCYTES ABSOLUTE: 0.7 K/UL (ref 0–0.9)
MONOCYTES RELATIVE PERCENT: 8 % (ref 0–10)
NEUTROPHILS ABSOLUTE: 4.8 K/UL (ref 1.5–7.5)
NEUTROPHILS RELATIVE PERCENT: 55.1 % (ref 50–65)
PDW BLD-RTO: 13.5 % (ref 11.5–14.5)
PLATELET # BLD: 294 K/UL (ref 130–400)
PMV BLD AUTO: 10.6 FL (ref 9.4–12.3)
RBC # BLD: 5.28 M/UL (ref 4.2–5.4)
WBC # BLD: 8.7 K/UL (ref 4.8–10.8)

## 2020-05-27 PROCEDURE — 99024 POSTOP FOLLOW-UP VISIT: CPT | Performed by: OBSTETRICS & GYNECOLOGY

## 2020-05-27 RX ORDER — DOXYCYCLINE HYCLATE 100 MG
100 TABLET ORAL 2 TIMES DAILY
Qty: 20 TABLET | Refills: 0 | Status: SHIPPED | OUTPATIENT
Start: 2020-05-27 | End: 2020-06-06

## 2020-05-27 RX ORDER — MEDROXYPROGESTERONE ACETATE 10 MG/1
10 TABLET ORAL DAILY
Qty: 10 TABLET | Refills: 0 | Status: SHIPPED | OUTPATIENT
Start: 2020-05-27 | End: 2021-04-28 | Stop reason: CLARIF

## 2020-06-12 RX ORDER — LISINOPRIL AND HYDROCHLOROTHIAZIDE 12.5; 1 MG/1; MG/1
1 TABLET ORAL DAILY
Qty: 90 TABLET | Refills: 0 | Status: SHIPPED | OUTPATIENT
Start: 2020-06-12 | End: 2020-09-16 | Stop reason: SDUPTHER

## 2020-06-12 NOTE — TELEPHONE ENCOUNTER
Estefania Osorio called to request a refill on her medication.       Last office visit : 1/24/2020   Next office visit : 7/28/2020     Requested Prescriptions     Pending Prescriptions Disp Refills    lisinopril-hydroCHLOROthiazide (PRINZIDE;ZESTORETIC) 10-12.5 MG per tablet 90 tablet 0     Sig: Take 1 tablet by mouth daily            Yudi Damon

## 2020-07-07 RX ORDER — EXENATIDE 2 MG/.65ML
INJECTION, SUSPENSION, EXTENDED RELEASE SUBCUTANEOUS
Qty: 4 PEN | Refills: 2 | Status: SHIPPED | OUTPATIENT
Start: 2020-07-07 | End: 2020-10-07

## 2020-09-16 ENCOUNTER — VIRTUAL VISIT (OUTPATIENT)
Dept: PRIMARY CARE CLINIC | Age: 55
End: 2020-09-16
Payer: COMMERCIAL

## 2020-09-16 PROCEDURE — 99443 PR PHYS/QHP TELEPHONE EVALUATION 21-30 MIN: CPT | Performed by: NURSE PRACTITIONER

## 2020-09-16 RX ORDER — LISINOPRIL AND HYDROCHLOROTHIAZIDE 12.5; 1 MG/1; MG/1
1 TABLET ORAL DAILY
Qty: 90 TABLET | Refills: 0 | Status: SHIPPED | OUTPATIENT
Start: 2020-09-16 | End: 2021-02-17 | Stop reason: SDUPTHER

## 2020-09-16 RX ORDER — AZELASTINE 1 MG/ML
1 SPRAY, METERED NASAL 2 TIMES DAILY
Qty: 1 BOTTLE | Refills: 0 | Status: SHIPPED | OUTPATIENT
Start: 2020-09-16 | End: 2021-04-28 | Stop reason: CLARIF

## 2020-09-16 RX ORDER — DOXYCYCLINE HYCLATE 100 MG
100 TABLET ORAL 2 TIMES DAILY
Qty: 20 TABLET | Refills: 0 | Status: SHIPPED | OUTPATIENT
Start: 2020-09-16 | End: 2020-09-26

## 2020-09-16 RX ORDER — GLUCOSAMINE HCL/CHONDROITIN SU 500-400 MG
CAPSULE ORAL
Qty: 100 STRIP | Refills: 2 | Status: SHIPPED | OUTPATIENT
Start: 2020-09-16

## 2020-09-16 ASSESSMENT — ENCOUNTER SYMPTOMS
HOARSE VOICE: 0
SINUS COMPLAINT: 1
SINUS PRESSURE: 1
SHORTNESS OF BREATH: 0
SWOLLEN GLANDS: 0
EYES NEGATIVE: 1
SORE THROAT: 1
GASTROINTESTINAL NEGATIVE: 1
COUGH: 1

## 2020-09-16 ASSESSMENT — PATIENT HEALTH QUESTIONNAIRE - PHQ9
SUM OF ALL RESPONSES TO PHQ QUESTIONS 1-9: 1
SUM OF ALL RESPONSES TO PHQ9 QUESTIONS 1 & 2: 1
1. LITTLE INTEREST OR PLEASURE IN DOING THINGS: 0
2. FEELING DOWN, DEPRESSED OR HOPELESS: 1
SUM OF ALL RESPONSES TO PHQ QUESTIONS 1-9: 1

## 2020-09-16 NOTE — PROGRESS NOTES
Radha Colbert is a 53 y.o. female evaluated via telephone on 9/16/2020. Consent:  She and/or health care decision maker is aware that that she may receive a bill for this telephone service, depending on her insurance coverage, and has provided verbal consent to proceed: Yes      Documentation:  I communicated with the patient and/or health care decision maker about sinus problem. Details of this discussion including any medical advice provided: see below    Sinus Problem   This is a new problem. The current episode started in the past 7 days. The problem has been gradually worsening since onset. There has been no fever. Her pain is at a severity of 5/10. The pain is moderate. Associated symptoms include congestion, coughing, ear pain, headaches, sinus pressure and a sore throat. Pertinent negatives include no chills, diaphoresis, hoarse voice, neck pain, shortness of breath, sneezing or swollen glands. Treatments tried: mucinex sinus max. The treatment provided mild relief. No change in PMH, family, social, or surgical history unless mentioned above. Review of Systems   Constitutional: Negative for chills and diaphoresis. HENT: Positive for congestion, ear pain, sinus pressure and sore throat. Negative for hoarse voice and sneezing. Eyes: Negative. Respiratory: Positive for cough. Negative for shortness of breath. Cardiovascular: Negative. Gastrointestinal: Negative. Endocrine: Negative. Musculoskeletal: Positive for arthralgias. Negative for neck pain. Allergic/Immunologic: Positive for environmental allergies. Neurological: Positive for headaches. Psychiatric/Behavioral: Negative.         Past Medical History:   Diagnosis Date    ASCUS of cervix with negative high risk HPV 06/19/2017    BV (bacterial vaginosis)     HPV (human papilloma virus) infection     Hypertension     Mitral valve prolapse     Morbid obesity ([address]     [address]al [address]        Current Outpatient Medications   Medication Sig Dispense Refill    doxycycline hyclate (VIBRA-TABS) 100 MG tablet Take 1 tablet by mouth 2 times daily for 10 days 20 tablet 0    azelastine (ASTELIN) 0.1 % nasal spray 1 spray by Nasal route 2 times daily Use in each nostril as directed 1 Bottle 0    blood glucose monitor strips Test 3 times a day & as needed for symptoms of irregular blood glucose. Dispense sufficient amount for indicated testing frequency plus additional to accommodate PRN testing needs. 100 strip 2    lisinopril-hydroCHLOROthiazide (PRINZIDE;ZESTORETIC) 10-12.5 MG per tablet Take 1 tablet by mouth daily 90 tablet 0    BYDUREON 2 MG PEN INJECT 1 SYRINGE INTO THE SKIN ONCE A WEEK 4 pen 2    medroxyPROGESTERone (PROVERA) 10 MG tablet Take 1 tablet by mouth daily 10 tablet 0    ibuprofen (ADVIL;MOTRIN) 800 MG tablet Take 1 tablet by mouth 2 times daily as needed for Pain 60 tablet 0    furosemide (LASIX) 20 MG tablet Take 1 tablet by mouth daily 30 tablet 3    Crisaborole (EUCRISA) 2 % OINT Apply 1 applicator topically daily 60 g 1    citalopram (CELEXA) 20 MG tablet Take 1 tablet by mouth daily 90 tablet 3     No current facility-administered medications for this visit.         Allergies   Allergen Reactions    Amoxil [Amoxicillin Trihydrate] Rash       Past Surgical History:   Procedure Laterality Date     SECTION      fetal decels/distress, baby was fine    CHOLECYSTECTOMY      lap patsy by Dr. Nubia Tipton       [facility]  2018    DILATION AND CURETTAGE OF UTERUS N/A 2020    EXAM UNDER ANESTHESIA, HYSTEROSCOPY, MYOSURE, DILATION AND CURETTAGE performed by Vanessa Cason MD at [facility] ENDOSCOPY  12    Dr. Addi Baum, negative JOANNA testing       Social History     Tobacco Use    Smoking status: Former Smoker     Packs/day: 0.50     Years: 4.00     Pack years: 2.00    Smokeless tobacco: Never Used    Tobacco comment: used to bum cigarettes off people with social drinking events, quit several years ago   Substance Use Topics    Alcohol use: Yes     Comment: social    Drug use: No       Family History   Problem Relation Age of Onset    Cancer Maternal Grandfather         questionable esophageal cancer       Assessment:    ICD-10-CM    1. Acute non-recurrent maxillary sinusitis  J01.00 doxycycline hyclate (VIBRA-TABS) 100 MG tablet   2. Environmental and seasonal allergies  J30.89 azelastine (ASTELIN) 0.1 % nasal spray   3. Well-controlled hypertension  I10 lisinopril-hydroCHLOROthiazide (PRINZIDE;ZESTORETIC) 10-12.5 MG per tablet   4. Type 2 diabetes mellitus without complication, without long-term current use of insulin (HCC)  E11.9 blood glucose monitor strips       Plan:   1. Acute non-recurrent maxillary sinusitis  - doxycycline hyclate (VIBRA-TABS) 100 MG tablet; Take 1 tablet by mouth 2 times daily for 10 days  Dispense: 20 tablet; Refill: 0    2. Environmental and seasonal allergies  - azelastine (ASTELIN) 0.1 % nasal spray; 1 spray by Nasal route 2 times daily Use in each nostril as directed  Dispense: 1 Bottle; Refill: 0    3. Well-controlled hypertension  - lisinopril-hydroCHLOROthiazide (PRINZIDE;ZESTORETIC) 10-12.5 MG per tablet; Take 1 tablet by mouth daily  Dispense: 90 tablet; Refill: 0    4. Type 2 diabetes mellitus without complication, without long-term current use of insulin (HCC)  - blood glucose monitor strips; Test 3 times a day & as needed for symptoms of irregular blood glucose. Dispense sufficient amount for indicated testing frequency plus additional to accommodate PRN testing needs. Dispense: 100 strip; Refill: 2    Over 50% of the total visit time of 22 minutes was spent on counseling and or coordination of care of sinusitis, allergies, hypertension, diabetes, medications, and follow-up. No orders of the defined types were placed in this encounter.     Orders Placed This Encounter Medications    doxycycline hyclate (VIBRA-TABS) 100 MG tablet     Sig: Take 1 tablet by mouth 2 times daily for 10 days     Dispense:  20 tablet     Refill:  0    azelastine (ASTELIN) 0.1 % nasal spray     Si spray by Nasal route 2 times daily Use in each nostril as directed     Dispense:  1 Bottle     Refill:  0    blood glucose monitor strips     Sig: Test 3 times a day & as needed for symptoms of irregular blood glucose. Dispense sufficient amount for indicated testing frequency plus additional to accommodate PRN testing needs. Dispense:  100 strip     Refill:  2     Brand per patient preference. May round up to next available package size.  lisinopril-hydroCHLOROthiazide (PRINZIDE;ZESTORETIC) 10-12.5 MG per tablet     Sig: Take 1 tablet by mouth daily     Dispense:  90 tablet     Refill:  0     Medications Discontinued During This Encounter   Medication Reason    lisinopril-hydroCHLOROthiazide (PRINZIDE;ZESTORETIC) 10-12.5 MG per tablet REORDER     There are no Patient Instructions on file for this visit. Patient given educational handouts and has had all questions answered. Patient voices understanding and agrees to plans along with risks and benefits of plan. Patient isinstructed to continue prior meds, diet, and exercise plans unless instructed otherwise. Patient agrees to follow up as instructed and sooner if needed. Patient agrees to go to ER if condition becomes emergent. Notesmay be completed with dictation device and spelling errors may occur. Return if symptoms worsen or fail to improve. I affirm this is a Patient Initiated Episode with an Established Patient who has not had a related appointment within my department in the past 7 days or scheduled within the next 24 hours.     Total Time: minutes: 21-30 minutes    Note: not billable if this call serves to triage the patient into an appointment for the relevant concern      [facility] were provided through

## 2020-09-18 ENCOUNTER — TELEPHONE (OUTPATIENT)
Dept: PRIMARY CARE CLINIC | Age: 55
End: 2020-09-18

## 2020-09-18 NOTE — TELEPHONE ENCOUNTER
Patient needs a letter stating that her current illness is a sinus infection. She had a VV worth LISSA Jules on 9/16. Per [employer] verbal order ok to send letter to [facility]. Faxed to [phone] attn. Kelly.

## 2020-10-07 RX ORDER — EXENATIDE 2 MG/.65ML
INJECTION, SUSPENSION, EXTENDED RELEASE SUBCUTANEOUS
Qty: 4 PEN | Refills: 2 | Status: SHIPPED | OUTPATIENT
Start: 2020-10-07 | End: 2021-01-17

## 2021-01-17 DIAGNOSIS — E11.9 TYPE 2 DIABETES MELLITUS WITHOUT COMPLICATION, WITHOUT LONG-TERM CURRENT USE OF INSULIN (HCC): ICD-10-CM

## 2021-01-17 RX ORDER — EXENATIDE 2 MG/.65ML
INJECTION, SUSPENSION, EXTENDED RELEASE SUBCUTANEOUS
Qty: 4 PEN | Refills: 2 | Status: SHIPPED | OUTPATIENT
Start: 2021-01-17 | End: 2021-02-17 | Stop reason: SDUPTHER

## 2021-02-17 DIAGNOSIS — I10 WELL-CONTROLLED HYPERTENSION: ICD-10-CM

## 2021-02-17 DIAGNOSIS — E11.9 TYPE 2 DIABETES MELLITUS WITHOUT COMPLICATION, WITHOUT LONG-TERM CURRENT USE OF INSULIN (HCC): ICD-10-CM

## 2021-02-17 RX ORDER — EXENATIDE 2 MG/.65ML
2 INJECTION, SUSPENSION, EXTENDED RELEASE SUBCUTANEOUS WEEKLY
Qty: 4 PEN | Refills: 0 | Status: SHIPPED | OUTPATIENT
Start: 2021-02-17 | End: 2021-03-31 | Stop reason: SDUPTHER

## 2021-02-17 RX ORDER — LISINOPRIL AND HYDROCHLOROTHIAZIDE 12.5; 1 MG/1; MG/1
1 TABLET ORAL DAILY
Qty: 30 TABLET | Refills: 0 | Status: SHIPPED | OUTPATIENT
Start: 2021-02-17 | End: 2021-04-19 | Stop reason: SDUPTHER

## 2021-02-17 NOTE — TELEPHONE ENCOUNTER
I attempted to reach pt via telephone to schedule her visit. Pt has not been seen since 9/2020 and according to our records we have not prescribed metformin since 2019. I informed pt via VM that we would send in a 1 month supply of bydureon however, pt needs to schedule appt within this next month in order to get additional refills.

## 2021-03-08 ENCOUNTER — TELEPHONE (OUTPATIENT)
Dept: ADMINISTRATIVE | Age: 56
End: 2021-03-08

## 2021-03-08 NOTE — TELEPHONE ENCOUNTER
Pt is needing med refill for diabetes mgmt. She is changing to Dr. Italia Hamilton after a close friend recommendation. She cant get in until 4/19 per the protocol. She asked for a refill to get her to that date. Please call her to advise.

## 2021-03-16 ENCOUNTER — TELEPHONE (OUTPATIENT)
Dept: ADMINISTRATIVE | Age: 56
End: 2021-03-16

## 2021-03-16 NOTE — TELEPHONE ENCOUNTER
Pt called asking if we can please work her in sooner. She is in need of diabetic medications. [facility] isnt able to refill for her and she is out of meds before her New pt appt with Dr. Price Camarena. I saw some 30 mins slots, but [facility] can only do one per week.  Could someone please try to work her in?

## 2021-03-16 NOTE — TELEPHONE ENCOUNTER
Pt has been notified she states she will contact Dr. Uvaldo Goldstein office to see if they can work her in any sooner and states she will call back if an appt. Is needed.

## 2021-03-18 ENCOUNTER — TELEPHONE (OUTPATIENT)
Dept: ADMINISTRATIVE | Age: 56
End: 2021-03-18

## 2021-03-26 ENCOUNTER — TELEPHONE (OUTPATIENT)
Dept: PRIMARY CARE CLINIC | Age: 56
End: 2021-03-26

## 2021-03-31 ENCOUNTER — VIRTUAL VISIT (OUTPATIENT)
Dept: PRIMARY CARE CLINIC | Age: 56
End: 2021-03-31
Payer: COMMERCIAL

## 2021-03-31 DIAGNOSIS — E11.9 TYPE 2 DIABETES MELLITUS WITHOUT COMPLICATION, WITHOUT LONG-TERM CURRENT USE OF INSULIN (HCC): Primary | ICD-10-CM

## 2021-03-31 DIAGNOSIS — I10 WELL-CONTROLLED HYPERTENSION: ICD-10-CM

## 2021-03-31 DIAGNOSIS — Z71.9 ENCOUNTER FOR COUNSELING: ICD-10-CM

## 2021-03-31 PROCEDURE — 3017F COLORECTAL CA SCREEN DOC REV: CPT | Performed by: NURSE PRACTITIONER

## 2021-03-31 PROCEDURE — 2022F DILAT RTA XM EVC RTNOPTHY: CPT | Performed by: NURSE PRACTITIONER

## 2021-03-31 PROCEDURE — 99213 OFFICE O/P EST LOW 20 MIN: CPT | Performed by: NURSE PRACTITIONER

## 2021-03-31 PROCEDURE — 3046F HEMOGLOBIN A1C LEVEL >9.0%: CPT | Performed by: NURSE PRACTITIONER

## 2021-03-31 PROCEDURE — G8427 DOCREV CUR MEDS BY ELIG CLIN: HCPCS | Performed by: NURSE PRACTITIONER

## 2021-03-31 RX ORDER — EXENATIDE 2 MG/.65ML
2 INJECTION, SUSPENSION, EXTENDED RELEASE SUBCUTANEOUS WEEKLY
Qty: 4 PEN | Refills: 0 | Status: SHIPPED | OUTPATIENT
Start: 2021-03-31 | End: 2021-04-28 | Stop reason: ALTCHOICE

## 2021-03-31 NOTE — PROGRESS NOTES
Sakina Powell (:  1965) is a 63 y.o. female,Established patient, here for evaluation of the following chief complaint(s): Diabetes      ASSESSMENT/PLAN:  1. Type 2 diabetes mellitus without complication, without long-term current use of insulin (HCC)  -     Exenatide (BYDUREON) 2 MG PEN; Inject 1 pen as directed once a week No additional refills until seen in office, Disp-4 pen, R-0Normal  2. Well-controlled hypertension  3. Encounter for counseling  - educated on medication and red flags; patient is switching to new PCP and only needed visit for refill of her current medication    Return if symptoms worsen or fail to improve. SUBJECTIVE/OBJECTIVE:  Diabetes  She presents for her follow-up diabetic visit. She has type 2 diabetes mellitus. Her disease course has been stable. There are no hypoglycemic associated symptoms. Pertinent negatives for hypoglycemia include no headaches or speech difficulty. Pertinent negatives for diabetes include no chest pain, no polydipsia, no polyphagia, no polyuria and no weakness. There are no hypoglycemic complications. There are no diabetic complications. Risk factors for coronary artery disease include obesity, family history, sedentary lifestyle and post-menopausal. Current diabetic treatments: bydureon. She is compliant with treatment most of the time. Her weight is stable. She is following a generally healthy diet. Meal planning includes avoidance of concentrated sweets. Her overall blood glucose range is 130-140 mg/dl. Hypertension  This is a chronic problem. The current episode started more than 1 year ago. The problem is unchanged. The problem is controlled. Pertinent negatives include no chest pain, headaches, neck pain, palpitations or shortness of breath. Past treatments include ACE inhibitors and diuretics. The current treatment provides significant improvement. Review of Systems   Constitutional: Negative.   Negative for chills, fever and unexpected weight change. HENT: Negative. Negative for sinus pressure, sore throat and trouble swallowing. Eyes: Negative. Negative for discharge and visual disturbance. Respiratory: Negative. Negative for cough, shortness of breath and wheezing. Cardiovascular: Negative. Negative for chest pain and palpitations. Gastrointestinal: Negative. Negative for blood in stool, diarrhea, nausea and vomiting. Endocrine: Negative. Negative for polydipsia, polyphagia and polyuria. Genitourinary: Negative. Negative for difficulty urinating, dysuria and flank pain. Musculoskeletal: Negative. Negative for gait problem, neck pain and neck stiffness. Skin: Negative. Negative for rash and wound. Allergic/Immunologic: Negative. Negative for environmental allergies. Neurological: Negative. Negative for syncope, speech difficulty, weakness, light-headedness and headaches. Hematological: Negative. Does not bruise/bleed easily. Psychiatric/Behavioral: Negative. Negative for self-injury and suicidal ideas. No flowsheet data found.      Physical Exam    [INSTRUCTIONS:  \"[x]\" Indicates a positive item  \"[]\" Indicates a negative item  -- DELETE ALL ITEMS NOT EXAMINED]    Constitutional: [x] Appears well-developed and well-nourished [x] No apparent distress      [] Abnormal -     Mental status: [x] Alert and awake  [x] Oriented to person/place/time [x] Able to follow commands    [] Abnormal -     Eyes:   EOM    [x]  Normal    [] Abnormal -   Sclera  [x]  Normal    [] Abnormal -          Discharge [x]  None visible   [] Abnormal -     HENT: [x] Normocephalic, atraumatic  [] Abnormal -   [x] Mouth/Throat: Mucous membranes are moist    External Ears [x] Normal  [] Abnormal -    Neck: [x] No visualized mass [] Abnormal -     Pulmonary/Chest: [x] Respiratory effort normal   [x] No visualized signs of difficulty breathing or respiratory distress        [] Abnormal -      Musculoskeletal:   [x] Normal gait with no signs of ataxia         [x] Normal range of motion of neck        [] Abnormal -     Neurological:        [x] No Facial Asymmetry (Cranial nerve 7 motor function) (limited exam due to video visit)          [x] No gaze palsy        [] Abnormal -          Skin:        [x] No significant exanthematous lesions or discoloration noted on facial skin         [] Abnormal -            Psychiatric:       [x] Normal Affect [] Abnormal -        [x] No Hallucinations    Other pertinent observable physical exam findings:-          On this date 3/31/2021 I have spent 20 minutes reviewing previous notes, test results and face to face (virtual) with the patient discussing the diagnosis and importance of compliance with the treatment plan as well as documenting on the day of the visit. Prachi Madan, was evaluated through a synchronous (real-time) audio-video encounter. The patient (or guardian if applicable) is aware that this is a billable service. Verbal consent to proceed has been obtained within the past 12 months. The visit was conducted pursuant to the emergency declaration under the [address] [place] [address] authority and the [employer] Act. Patient identification was verified, and a caregiver was present when appropriate. The patient was located in a state where the provider was credentialed to provide care. An electronic signature was used to authenticate this note.     --JEN Trotter NP

## 2021-04-01 ASSESSMENT — ENCOUNTER SYMPTOMS
SINUS PRESSURE: 0
SORE THROAT: 0
BLOOD IN STOOL: 0
EYE DISCHARGE: 0
DIARRHEA: 0
RESPIRATORY NEGATIVE: 1
EYES NEGATIVE: 1
GASTROINTESTINAL NEGATIVE: 1
VOMITING: 0
ALLERGIC/IMMUNOLOGIC NEGATIVE: 1
WHEEZING: 0
SHORTNESS OF BREATH: 0
TROUBLE SWALLOWING: 0
COUGH: 0
NAUSEA: 0

## 2021-04-28 ENCOUNTER — OFFICE VISIT (OUTPATIENT)
Dept: INTERNAL MEDICINE | Age: 56
End: 2021-04-28
Payer: COMMERCIAL

## 2021-04-28 VITALS
SYSTOLIC BLOOD PRESSURE: 118 MMHG | BODY MASS INDEX: 39.87 KG/M2 | HEART RATE: 79 BPM | DIASTOLIC BLOOD PRESSURE: 80 MMHG | WEIGHT: 254 LBS | OXYGEN SATURATION: 97 % | HEIGHT: 67 IN

## 2021-04-28 DIAGNOSIS — K76.0 NAFLD (NONALCOHOLIC FATTY LIVER DISEASE): ICD-10-CM

## 2021-04-28 DIAGNOSIS — Z12.12 SCREENING FOR COLORECTAL CANCER: ICD-10-CM

## 2021-04-28 DIAGNOSIS — E53.8 B12 DEFICIENCY: ICD-10-CM

## 2021-04-28 DIAGNOSIS — E11.9 TYPE 2 DIABETES MELLITUS WITHOUT COMPLICATION, WITHOUT LONG-TERM CURRENT USE OF INSULIN (HCC): Primary | ICD-10-CM

## 2021-04-28 DIAGNOSIS — L70.8 INFLAMMATORY ACNE: ICD-10-CM

## 2021-04-28 DIAGNOSIS — F41.8 DEPRESSION WITH ANXIETY: ICD-10-CM

## 2021-04-28 DIAGNOSIS — Z12.11 SCREENING FOR COLORECTAL CANCER: ICD-10-CM

## 2021-04-28 DIAGNOSIS — I10 ESSENTIAL HYPERTENSION: ICD-10-CM

## 2021-04-28 DIAGNOSIS — E11.9 TYPE 2 DIABETES MELLITUS WITHOUT COMPLICATION, WITHOUT LONG-TERM CURRENT USE OF INSULIN (HCC): ICD-10-CM

## 2021-04-28 DIAGNOSIS — F33.8 SEASONAL DEPRESSION (HCC): ICD-10-CM

## 2021-04-28 DIAGNOSIS — E66.09 EXOGENOUS OBESITY: ICD-10-CM

## 2021-04-28 DIAGNOSIS — E55.9 VITAMIN D DEFICIENCY: ICD-10-CM

## 2021-04-28 DIAGNOSIS — R21 FACIAL RASH: ICD-10-CM

## 2021-04-28 DIAGNOSIS — R82.71 BACTERIURIA: ICD-10-CM

## 2021-04-28 LAB
ALBUMIN SERPL-MCNC: 4.1 G/DL (ref 3.5–5.2)
ALP BLD-CCNC: 70 U/L (ref 35–104)
ALT SERPL-CCNC: 47 U/L (ref 5–33)
ANION GAP SERPL CALCULATED.3IONS-SCNC: 14 MMOL/L (ref 7–19)
AST SERPL-CCNC: 51 U/L (ref 5–32)
BACTERIA: ABNORMAL /HPF
BASOPHILS ABSOLUTE: 0 K/UL (ref 0–0.2)
BASOPHILS RELATIVE PERCENT: 0.6 % (ref 0–1)
BILIRUB SERPL-MCNC: 0.6 MG/DL (ref 0.2–1.2)
BILIRUBIN URINE: ABNORMAL
BLOOD, URINE: NEGATIVE
BUN BLDV-MCNC: 8 MG/DL (ref 6–20)
CALCIUM SERPL-MCNC: 9.2 MG/DL (ref 8.6–10)
CHLORIDE BLD-SCNC: 99 MMOL/L (ref 98–111)
CHOLESTEROL, TOTAL: 138 MG/DL (ref 160–199)
CLARITY: ABNORMAL
CO2: 26 MMOL/L (ref 22–29)
COLOR: ABNORMAL
CREAT SERPL-MCNC: 0.8 MG/DL (ref 0.5–0.9)
CREATININE URINE: 368.7 MG/DL (ref 4.2–622)
EOSINOPHILS ABSOLUTE: 0.1 K/UL (ref 0–0.6)
EOSINOPHILS RELATIVE PERCENT: 2 % (ref 0–5)
EPITHELIAL CELLS, UA: ABNORMAL /HPF
GFR AFRICAN AMERICAN: >59
GFR NON-AFRICAN AMERICAN: >60
GLUCOSE BLD-MCNC: 164 MG/DL (ref 74–109)
GLUCOSE URINE: NEGATIVE MG/DL
HBA1C MFR BLD: 6.8 %
HCT VFR BLD CALC: 45.8 % (ref 37–47)
HDLC SERPL-MCNC: 45 MG/DL (ref 65–121)
HEMOGLOBIN: 14.5 G/DL (ref 12–16)
IMMATURE GRANULOCYTES #: 0 K/UL
KETONES, URINE: ABNORMAL MG/DL
LDL CHOLESTEROL CALCULATED: 76 MG/DL
LEUKOCYTE ESTERASE, URINE: ABNORMAL
LYMPHOCYTES ABSOLUTE: 2.2 K/UL (ref 1.1–4.5)
LYMPHOCYTES RELATIVE PERCENT: 32.9 % (ref 20–40)
MCH RBC QN AUTO: 26.9 PG (ref 27–31)
MCHC RBC AUTO-ENTMCNC: 31.7 G/DL (ref 33–37)
MCV RBC AUTO: 85 FL (ref 81–99)
MICROALBUMIN UR-MCNC: 1.5 MG/DL (ref 0–19)
MICROALBUMIN/CREAT UR-RTO: 4.1 MG/G
MONOCYTES ABSOLUTE: 0.5 K/UL (ref 0–0.9)
MONOCYTES RELATIVE PERCENT: 7.6 % (ref 0–10)
NEUTROPHILS ABSOLUTE: 3.7 K/UL (ref 1.5–7.5)
NEUTROPHILS RELATIVE PERCENT: 56.6 % (ref 50–65)
NITRITE, URINE: NEGATIVE
PDW BLD-RTO: 13.2 % (ref 11.5–14.5)
PH UA: 5.5 (ref 5–8)
PLATELET # BLD: 246 K/UL (ref 130–400)
PMV BLD AUTO: 10.5 FL (ref 9.4–12.3)
POTASSIUM SERPL-SCNC: 4.1 MMOL/L (ref 3.5–5)
PROTEIN UA: ABNORMAL MG/DL
RBC # BLD: 5.39 M/UL (ref 4.2–5.4)
RBC UA: ABNORMAL /HPF (ref 0–2)
SODIUM BLD-SCNC: 139 MMOL/L (ref 136–145)
SPECIFIC GRAVITY UA: 1.02 (ref 1–1.03)
TOTAL PROTEIN: 8.2 G/DL (ref 6.6–8.7)
TRIGL SERPL-MCNC: 84 MG/DL (ref 0–149)
TSH SERPL DL<=0.05 MIU/L-ACNC: 2.14 UIU/ML (ref 0.27–4.2)
UROBILINOGEN, URINE: 1 E.U./DL
VITAMIN B-12: 795 PG/ML (ref 211–946)
VITAMIN D 25-HYDROXY: 11.6 NG/ML
WBC # BLD: 6.6 K/UL (ref 4.8–10.8)
WBC UA: ABNORMAL /HPF (ref 0–5)

## 2021-04-28 PROCEDURE — 2022F DILAT RTA XM EVC RTNOPTHY: CPT | Performed by: INTERNAL MEDICINE

## 2021-04-28 PROCEDURE — G8417 CALC BMI ABV UP PARAM F/U: HCPCS | Performed by: INTERNAL MEDICINE

## 2021-04-28 PROCEDURE — G8427 DOCREV CUR MEDS BY ELIG CLIN: HCPCS | Performed by: INTERNAL MEDICINE

## 2021-04-28 PROCEDURE — 3044F HG A1C LEVEL LT 7.0%: CPT | Performed by: INTERNAL MEDICINE

## 2021-04-28 PROCEDURE — 83036 HEMOGLOBIN GLYCOSYLATED A1C: CPT | Performed by: INTERNAL MEDICINE

## 2021-04-28 PROCEDURE — 99205 OFFICE O/P NEW HI 60 MIN: CPT | Performed by: INTERNAL MEDICINE

## 2021-04-28 PROCEDURE — 1036F TOBACCO NON-USER: CPT | Performed by: INTERNAL MEDICINE

## 2021-04-28 PROCEDURE — 3017F COLORECTAL CA SCREEN DOC REV: CPT | Performed by: INTERNAL MEDICINE

## 2021-04-28 RX ORDER — SEMAGLUTIDE 1.34 MG/ML
INJECTION, SOLUTION SUBCUTANEOUS
COMMUNITY
End: 2021-09-01 | Stop reason: CLARIF

## 2021-04-28 RX ORDER — DOXYCYCLINE HYCLATE 100 MG
100 TABLET ORAL 2 TIMES DAILY
Qty: 20 TABLET | Refills: 0 | Status: SHIPPED | OUTPATIENT
Start: 2021-04-28 | End: 2021-05-08

## 2021-04-28 RX ORDER — CITALOPRAM 20 MG/1
TABLET ORAL
Qty: 90 TABLET | Refills: 1 | Status: SHIPPED | OUTPATIENT
Start: 2021-04-28 | End: 2021-10-25

## 2021-04-28 RX ORDER — LISINOPRIL AND HYDROCHLOROTHIAZIDE 12.5; 1 MG/1; MG/1
1 TABLET ORAL DAILY
Qty: 90 TABLET | Refills: 1 | Status: SHIPPED | OUTPATIENT
Start: 2021-04-28 | End: 2021-11-10

## 2021-04-28 SDOH — ECONOMIC STABILITY: TRANSPORTATION INSECURITY
IN THE PAST 12 MONTHS, HAS LACK OF TRANSPORTATION KEPT YOU FROM MEETINGS, WORK, OR FROM GETTING THINGS NEEDED FOR DAILY LIVING?: NO

## 2021-04-28 SDOH — ECONOMIC STABILITY: INCOME INSECURITY: HOW HARD IS IT FOR YOU TO PAY FOR THE VERY BASICS LIKE FOOD, HOUSING, MEDICAL CARE, AND HEATING?: NOT HARD AT ALL

## 2021-04-28 SDOH — ECONOMIC STABILITY: FOOD INSECURITY: WITHIN THE PAST 12 MONTHS, THE FOOD YOU BOUGHT JUST DIDN'T LAST AND YOU DIDN'T HAVE MONEY TO GET MORE.: NEVER TRUE

## 2021-04-28 SDOH — ECONOMIC STABILITY: TRANSPORTATION INSECURITY
IN THE PAST 12 MONTHS, HAS THE LACK OF TRANSPORTATION KEPT YOU FROM MEDICAL APPOINTMENTS OR FROM GETTING MEDICATIONS?: NO

## 2021-04-28 ASSESSMENT — ENCOUNTER SYMPTOMS
COLOR CHANGE: 0
CONSTIPATION: 0
DIARRHEA: 0
ABDOMINAL PAIN: 0
VOICE CHANGE: 0
EYE REDNESS: 0
VOMITING: 0
TROUBLE SWALLOWING: 0
CHEST TIGHTNESS: 0
SINUS PRESSURE: 0
COUGH: 0
EYE PAIN: 0
WHEEZING: 0
NAUSEA: 0
BLOOD IN STOOL: 0
SORE THROAT: 0

## 2021-04-28 NOTE — PROGRESS NOTES
Refill    doxycycline hyclate (VIBRA-TABS) 100 MG tablet Take 1 tablet by mouth 2 times daily for 10 days 20 tablet 0    mupirocin (BACTROBAN) 2 % ointment Apply 2 times daily bilateral nostrils 22 g 0    Semaglutide,0.25 or 0.5MG/DOS, (OZEMPIC, 0.25 OR 0.5 MG/DOSE,) 2 MG/1.5ML SOPN Inject into the skin      lisinopril-hydroCHLOROthiazide (PRINZIDE;ZESTORETIC) 10-12.5 MG per tablet Take 1 tablet by mouth daily No additional refills until seen in office 90 tablet 1    citalopram (CELEXA) 20 MG tablet TAKE 1 TABLET BY MOUTH EVERY DAY 90 tablet 1    [START ON 2021] Semaglutide, 1 MG/DOSE, 2 MG/1.5ML SOPN Inject 1 mg into the skin once a week 2 pen 1    blood glucose monitor strips Test 3 times a day & as needed for symptoms of irregular blood glucose. Dispense sufficient amount for indicated testing frequency plus additional to accommodate PRN testing needs. 100 strip 2    Crisaborole (EUCRISA) 2 % OINT Apply 1 applicator topically daily 60 g 1     No current facility-administered medications for this visit. Allergies   Allergen Reactions    Amoxil [Amoxicillin Trihydrate] Rash     Social History     Tobacco Use    Smoking status: Former Smoker     Packs/day: 0.25     Years: 4.00     Pack years: 1.00     Start date: 1980     Quit date: 2001     Years since quittin.5    Smokeless tobacco: Never Used    Tobacco comment: used to bum cigarettes off people with social drinking events, quit several years ago   Substance Use Topics    Alcohol use: Yes     Comment: social      Family History   Problem Relation Age of Onset    Hypertension Mother     Cancer Maternal Grandfather         questionable esophageal cancer       Review of Systems   Constitutional: Negative for chills, fatigue and fever. HENT: Negative for congestion, ear pain, postnasal drip, sinus pressure, sore throat, trouble swallowing and voice change. Eyes: Negative for pain, redness and visual disturbance. Respiratory: Negative for cough, chest tightness and wheezing. Cardiovascular: Negative for chest pain, palpitations and leg swelling. Gastrointestinal: Negative for abdominal pain, blood in stool, constipation, diarrhea, nausea and vomiting. Endocrine: Negative for polydipsia and polyuria. Genitourinary: Negative for dysuria, enuresis, flank pain, frequency and urgency. Musculoskeletal: Negative for arthralgias, gait problem and joint swelling. Skin: Positive for rash. Negative for color change. Neurological: Negative for dizziness, tremors, syncope, facial asymmetry, speech difficulty, weakness, numbness and headaches. Psychiatric/Behavioral: Negative for agitation, behavioral problems, confusion, sleep disturbance and suicidal ideas. The patient is not nervous/anxious. Vitals:    04/28/21 0733   BP: 118/80   Pulse: 79   SpO2: 97%   Weight: 254 lb (115.2 kg)   Height: 5' 7\" (1.702 m)     Body mass index is 39.78 kg/m². Physical Exam  Constitutional:       Appearance: She is well-developed. She is obese. HENT:      Right Ear: External ear normal.      Left Ear: External ear normal.      Mouth/Throat:      Pharynx: No oropharyngeal exudate. Eyes:      Conjunctiva/sclera: Conjunctivae normal.      Pupils: Pupils are equal, round, and reactive to light. Neck:      Musculoskeletal: Neck supple. Thyroid: No thyromegaly. Vascular: No JVD. Cardiovascular:      Rate and Rhythm: Normal rate. Heart sounds: Normal heart sounds. No murmur. Pulmonary:      Effort: No respiratory distress. Breath sounds: Normal breath sounds. No wheezing or rales. Chest:      Chest wall: No tenderness. Abdominal:      General: Bowel sounds are normal.      Palpations: Abdomen is soft. Lymphadenopathy:      Cervical: No cervical adenopathy. Skin:     General: Skin is warm. Findings: No rash.       Comments: Inflammatory acne bilateral face/more left paranasal/maxillary area Ozempic as above  Pt was givencounseling about diet and exercise including education on what calories are, where calories come from, the need for portion control,following lower carbohydrate dietary regimen and healthy snacks along side an active lifestyle with supplementary exercise of approx 30 minutes a week, 5 days a week of exercise for weight loss. The patient voiced increased understanding of the topics discussed. Vitamin D deficiency  She has not been taking vitamin D supplementation  Around 3 years ago level was 31  Obtain vitamin D levels today, then further plan    -     Vitamin D 25 Hydroxy; Future    B12 deficiency  Previously borderline  -     Vitamin B12; Future    Seasonal depression ([address]  Has been doing well on celexa  RX celexa 20 mg daily    Facial rash  Inflammatory acne?   SX over last 2 weeks  Worry about possible staph colonization/contributing to infection  Rx doxycycline 100 twice daily x10 days, avoid sunburn while taking doxycycline  Mupirocin intranasally twice daily  If sx not improving and resolving , if sx continue or re-occur pt has been instructed to call us and / or return here for follow- up evaluation    Right great toenail onychomycosis  This is diagnosis based on patient's iPhone photo  Since with starting new medications at this time would not give her any prescription for onychomycosis/we will discuss at next visit          [facility] [place] for [place] cancer- schedule  -     [facility] 3/2021  Bone density- plan with next mammogram  Mammo + pap- per gyn    ADDENDUM:  1.  Her vitamin B12 levels are in good range  2.  Her vitamin D level is extremely low at 11.6  Suggest vitamin D prescription 50,000 IU once weekly  3.  Suggest we repeat vitamin D level prior to her next appointment in 3 months. Hugo Bowles will place lab orders  4.  Liver tests are elevated consistent with fatty liver disease  Healthy lower in saturated fats-which mostly is animal fats diet is recommended to prevent further damage  5.  Cholesterol is in normal range  6.  Thyroid level is normal range  7.  Urine to screen for diabetic damage is negative for microalbumin    Orders Placed This Encounter   Procedures    Culture, Urine    CBC Auto Differential    Comprehensive Metabolic Panel    Lipid Panel    Microalbumin / Creatinine Urine Ratio    Urinalysis    TSH without Reflex    Vitamin D 25 Hydroxy    Vitamin B12    Comprehensive Metabolic Panel    Hemoglobin A1C    Vitamin D 25 Hydroxy    [facility], [place]    POCT glycosylated hemoglobin (Hb A1C)     New Prescriptions    DOXYCYCLINE HYCLATE (VIBRA-TABS) 100 MG TABLET    Take 1 tablet by mouth 2 times daily for 10 days    MUPIROCIN (BACTROBAN) 2 % OINTMENT    Apply 2 times daily bilateral nostrils    SEMAGLUTIDE, 1 MG/DOSE, 2 MG/1.5ML SOPN    Inject 1 mg into the skin once a week         Return in about 3 months (around 7/28/2021) for Medication check. There are no Patient Instructions on file for this visit. EMR Dragon/transcription disclaimer:Significant part of this  encounter note is electronic transcription/translationof spoken language to printed text. The electronic translation of spoken language may be erroneous, or at times, nonsensical words or phrases may be inadvertently transcribed.  Although I have reviewed the note for sucherrors, some may still exist.

## 2021-04-30 LAB — URINE CULTURE, ROUTINE: NORMAL

## 2021-05-11 DIAGNOSIS — E55.9 VITAMIN D DEFICIENCY: Primary | ICD-10-CM

## 2021-05-11 RX ORDER — ERGOCALCIFEROL 1.25 MG/1
50000 CAPSULE ORAL WEEKLY
Qty: 12 CAPSULE | Refills: 1 | Status: SHIPPED | OUTPATIENT
Start: 2021-05-11 | End: 2021-10-18

## 2021-06-09 ENCOUNTER — PATIENT MESSAGE (OUTPATIENT)
Dept: INTERNAL MEDICINE | Age: 56
End: 2021-06-09

## 2021-06-09 DIAGNOSIS — R21 FACIAL RASH: Primary | ICD-10-CM

## 2021-06-09 NOTE — TELEPHONE ENCOUNTER
Recommendations  1. Facial rash. Proceed with dermatology referral  2.   Sinus issues  Recommend following  Flonase no spray 2 sprays each side once daily  Xyzal 5 mg p.o. daily  Take Mucinex to thin congestion once or twice daily  If not better we may need to see her

## 2021-06-09 NOTE — TELEPHONE ENCOUNTER
From: Eliezer Miranda  To: Lena Cheng MD  Sent: 6/9/2021 1:08 PM CDT  Subject: Visit Follow-Up Question    Miss Varsha chen - the Bactroban didn't work; Dr Jese Samuels said at my appt, that the next step would be a dermatologist; could you please ask her who she would refer me to? Also, I've had a sinus cold since Saturday May 29th, it has gotten alot better, but I can't shake the cough and still some congestion. Could I get something called in?    thanks!

## 2021-06-28 ENCOUNTER — HOSPITAL ENCOUNTER (OUTPATIENT)
Age: 56
Setting detail: OUTPATIENT SURGERY
Discharge: HOME | End: 2021-06-28
Attending: INTERNAL MEDICINE | Admitting: INTERNAL MEDICINE
Payer: COMMERCIAL

## 2021-06-28 ENCOUNTER — ANESTHESIA EVENT (OUTPATIENT)
Dept: ENDOSCOPY | Age: 56
End: 2021-06-28
Payer: COMMERCIAL

## 2021-06-28 ENCOUNTER — ANESTHESIA (OUTPATIENT)
Dept: ENDOSCOPY | Age: 56
End: 2021-06-28
Payer: COMMERCIAL

## 2021-06-28 VITALS
RESPIRATION RATE: 23 BRPM | DIASTOLIC BLOOD PRESSURE: 71 MMHG | SYSTOLIC BLOOD PRESSURE: 141 MMHG | TEMPERATURE: 97 F | OXYGEN SATURATION: 97 %

## 2021-06-28 VITALS
HEART RATE: 79 BPM | DIASTOLIC BLOOD PRESSURE: 74 MMHG | OXYGEN SATURATION: 98 % | WEIGHT: 250 LBS | HEIGHT: 67 IN | BODY MASS INDEX: 39.24 KG/M2 | SYSTOLIC BLOOD PRESSURE: 109 MMHG | TEMPERATURE: 98.5 F | RESPIRATION RATE: 18 BRPM

## 2021-06-28 LAB
GLUCOSE BLD-MCNC: 172 MG/DL (ref 70–99)
HCG(URINE) PREGNANCY TEST: NEGATIVE
PERFORMED ON: ABNORMAL

## 2021-06-28 PROCEDURE — 2709999900 HC NON-CHARGEABLE SUPPLY: Performed by: INTERNAL MEDICINE

## 2021-06-28 PROCEDURE — 7100000011 HC PHASE II RECOVERY - ADDTL 15 MIN: Performed by: INTERNAL MEDICINE

## 2021-06-28 PROCEDURE — 2580000003 HC RX 258: Performed by: INTERNAL MEDICINE

## 2021-06-28 PROCEDURE — 84703 CHORIONIC GONADOTROPIN ASSAY: CPT

## 2021-06-28 PROCEDURE — 82947 ASSAY GLUCOSE BLOOD QUANT: CPT

## 2021-06-28 PROCEDURE — 45378 DIAGNOSTIC COLONOSCOPY: CPT | Performed by: INTERNAL MEDICINE

## 2021-06-28 PROCEDURE — 6360000002 HC RX W HCPCS

## 2021-06-28 PROCEDURE — 3700000000 HC ANESTHESIA ATTENDED CARE: Performed by: INTERNAL MEDICINE

## 2021-06-28 PROCEDURE — 7100000010 HC PHASE II RECOVERY - FIRST 15 MIN: Performed by: INTERNAL MEDICINE

## 2021-06-28 PROCEDURE — 2500000003 HC RX 250 WO HCPCS

## 2021-06-28 PROCEDURE — 3700000001 HC ADD 15 MINUTES (ANESTHESIA): Performed by: INTERNAL MEDICINE

## 2021-06-28 PROCEDURE — 3609027000 HC COLONOSCOPY: Performed by: INTERNAL MEDICINE

## 2021-06-28 RX ORDER — SODIUM CHLORIDE, SODIUM LACTATE, POTASSIUM CHLORIDE, CALCIUM CHLORIDE 600; 310; 30; 20 MG/100ML; MG/100ML; MG/100ML; MG/100ML
INJECTION, SOLUTION INTRAVENOUS CONTINUOUS
Status: DISCONTINUED | OUTPATIENT
Start: 2021-06-28 | End: 2021-06-28 | Stop reason: HOSPADM

## 2021-06-28 RX ORDER — LIDOCAINE HYDROCHLORIDE 10 MG/ML
INJECTION, SOLUTION EPIDURAL; INFILTRATION; INTRACAUDAL; PERINEURAL PRN
Status: DISCONTINUED | OUTPATIENT
Start: 2021-06-28 | End: 2021-06-28 | Stop reason: SDUPTHER

## 2021-06-28 RX ORDER — PROPOFOL 10 MG/ML
INJECTION, EMULSION INTRAVENOUS PRN
Status: DISCONTINUED | OUTPATIENT
Start: 2021-06-28 | End: 2021-06-28 | Stop reason: SDUPTHER

## 2021-06-28 RX ADMIN — SODIUM CHLORIDE, POTASSIUM CHLORIDE, SODIUM LACTATE AND CALCIUM CHLORIDE: 600; 310; 30; 20 INJECTION, SOLUTION INTRAVENOUS at 09:20

## 2021-06-28 RX ADMIN — PROPOFOL 400 MG: 10 INJECTION, EMULSION INTRAVENOUS at 09:55

## 2021-06-28 RX ADMIN — LIDOCAINE HYDROCHLORIDE 30 MG: 10 INJECTION, SOLUTION EPIDURAL; INFILTRATION; INTRACAUDAL; PERINEURAL at 09:55

## 2021-06-28 ASSESSMENT — PAIN SCALES - GENERAL
PAINLEVEL_OUTOF10: 0
PAINLEVEL_OUTOF10: 0

## 2021-06-28 ASSESSMENT — LIFESTYLE VARIABLES: SMOKING_STATUS: 0

## 2021-06-28 ASSESSMENT — PAIN - FUNCTIONAL ASSESSMENT: PAIN_FUNCTIONAL_ASSESSMENT: 0-10

## 2021-06-28 NOTE — H&P
Patient Name: Juan Guerrero  : 1965  MRN: [id number]  DATE: 21    Allergies: Allergies   Allergen Reactions    Amoxil [Amoxicillin Trihydrate] Rash        ENDOSCOPY  History and Physical    Procedure:    [] Diagnostic Colonoscopy       [x] Screening Colonoscopy  [] EGD      [] ERCP      [] EUS       [] Other    [x] Previous office notes/History and Physical reviewed from the patients chart. Please see EMR for further details of HPI. I have examined the patient's status immediately prior to the procedure and:      Indications/HPI:    []Abdominal Pain   []Cancer- GI/Lung     []Fhx of colon CA/polyps  []History of Polyps  []Barretts            []Melena  []Abnormal Imaging              []Dysphagia              []Persistent Pneumonia   []Anemia                            []Food Impaction        []History of Polyps  [] GI Bleed             []Pulmonary nodule/Mass   []Change in bowel habits []Heartburn/Reflux  []Rectal Bleed (BRBPR)  []Chest Pain - Non Cardiac []Heme (+) Stool []Ulcers  []Constipation  []Hemoptysis  []Varices  []Diarrhea  []Hypoxemia    []Nausea/Vomiting   [x]Screening   []Crohns/Colitis  []Other:     Anesthesia:   [x] MAC [] Moderate Sedation   [] General   [] None     ROS: 12 pt Review of Symptoms was negative unless mentioned above    Medications:   Prior to Admission medications    Medication Sig Start Date End Date Taking?  Authorizing Provider   vitamin D (ERGOCALCIFEROL) 1.25 MG (47176 UT) CAPS capsule Take 1 capsule by mouth once a week 21  Yes Mari Eller MD   Semaglutide,0.25 or 0.5MG/DOS, (OZEMPIC, 0.25 OR 0.5 MG/DOSE,) 2 MG/1.5ML SOPN Inject into the skin   Yes Historical Provider, MD   lisinopril-hydroCHLOROthiazide (PRINZIDE;ZESTORETIC) 10-12.5 MG per tablet Take 1 tablet by mouth daily No additional refills until seen in office 21 Yes Mari Eller MD   Semaglutide, 1 MG/DOSE, 2 MG/1.5ML SOPN Inject 1 mg into the skin once a week 21  Yes Cassie Donald Lee MD   citalopram (CELEXA) 20 MG tablet TAKE 1 TABLET BY MOUTH EVERY DAY 21   Jorge Alberto Jean-Baptiste MD   blood glucose monitor strips Test 3 times a day & as needed for symptoms of irregular blood glucose. Dispense sufficient amount for indicated testing frequency plus additional to accommodate PRN testing needs.  20   JEN Hartmann - NP   Migdalia Anammarek (EUCRISA) 2 % OINT Apply 1 applicator topically daily 20   JEN Roland       Past Medical History:  Past Medical History:   Diagnosis Date    ASCUS of cervix with negative high risk HPV 2017    BV (bacterial vaginosis)     HPV (human papilloma virus) infection     Hypertension     Mitral valve prolapse     Morbid obesity ([facility] [address]     Seasonal depression ([facility] [address] [facility]        Past Surgical History:  Past Surgical History:   Procedure Laterality Date     SECTION      fetal decels/distress, baby was fine    CHOLECYSTECTOMY      lap patsy by Dr. Russel Jarvis      [facility]  2018    Katie Valentine N/A 2020    EXAM UNDER ANESTHESIA, HYSTEROSCOPY, Armond Encinas, DILATION AND CURETTAGE performed by Ricky Greer MD at [facility] ENDOSCOPY  12    Dr. Scot Nunez, negative JOANNA testing       Social History:  Social History     Tobacco Use    Smoking status: Former Smoker     Packs/day: 0.25     Years: 4.00     Pack years: 1.00     Start date: 1980     Quit date: 2001     Years since quittin.6    Smokeless tobacco: Never Used    Tobacco comment: used to bum cigarettes off people with social drinking events, quit several years ago   Substance Use Topics    Alcohol use: Yes     Comment: social    Drug use: No       Vital Signs:   Vitals:    21 0909   BP: 128/88   Pulse: 79   Resp: 18   Temp: 98.9 °F (37.2 °C)   SpO2: 100%        Physical Exam:  Cardiac:  [x]WNL  []Comments:  Pulmonary:  [x]WNL   []Comments:  Neuro/Mental Status:  [x]WNL []Comments:  Abdominal:  [x]WNL    []Comments:  Other:   []WNL  []Comments:    Informed Consent:  The risks and benefits of the procedure have been discussed with either the patient or if they cannot consent, their representative. Assessment:  Patient examined and appropriate for planned sedation and procedure. Plan:  Proceed with planned sedation and procedure as above.          Wolfgang Tomlin MD

## 2021-06-28 NOTE — DISCHARGE INSTR - DIET

## 2021-06-28 NOTE — ANESTHESIA POSTPROCEDURE EVALUATION
Department of Anesthesiology  Postprocedure Note    Patient: Mervat Osei  MRN: [id number]  YOB: 1965  Date of evaluation: 6/28/2021  Time:  10:12 AM     Procedure Summary     Date: 06/28/21 Room / Location: [address]    Anesthesia Start: 8151 Anesthesia Stop: 1011    Procedure: COLORECTAL CANCER SCREENING, NOT HIGH RISK (N/A ) Diagnosis: (HX POLYPS)    Surgeons: Ivonne Culver MD Responsible Provider: JEN Alcantara CRNA    Anesthesia Type: general, TIVA ASA Status: 3          Anesthesia Type: general, TIVA    Doron Phase I: Doron Score: 10    Doron Phase II:      Last vitals: Reviewed and per EMR flowsheets.        Anesthesia Post Evaluation    Patient location during evaluation: bedside  Patient participation: waiting for patient participation  Level of consciousness: sleepy but conscious  Pain score: 0  Airway patency: patent  Nausea & Vomiting: no nausea and no vomiting  Complications: no  Cardiovascular status: hemodynamically stable and blood pressure returned to baseline  Respiratory status: acceptable and room air  Hydration status: stable

## 2021-06-28 NOTE — OP NOTE
Patient: Ab Mcmahon : 1965  [facility] Rec#: [id number] Acc#: [id number]   Primary Care Provider Missy Zepeda MD    Date of Procedure:  2021    Endoscopist: Elisha Gomez MD, MD    Referring Provider: Missy Zepeda MD,     Operation Performed: Colonoscopy up to the terminal ileum    Indications: History of polyps; needs colorectal cancer surveillance    Anesthesia:  Sedation was administered by anesthesia who monitored the patient during the procedure. I met with Ab Mcmahon prior to procedure. We discussed the procedure itself, and I have discussed the risks of endoscopy (including-- but not limited to-- pain, discomfort, bleeding potentially requiring second endoscopic procedure and/or blood transfusion, organ perforation requiring operative repair, damage to organs near the colon, infection, aspiration, cardiopulmonary/allergic reaction), benefits, indications to endoscopy. Additionally, we discussed options other than colonoscopy. The patient expressed understanding. All questions answered. The patient decided to proceed with the procedure. Signed informed consent was placed on the chart. Blood Loss: minimal    Withdrawal time: More than 6 minutes  Bowel Prep: adequate     Complications: no immediate complications    DESCRIPTION OF PROCEDURE:     A time out was performed. After written informed consent was obtained, the patient was placed in the left lateral position. The perianal area was inspected, and a digital rectal exam was performed. A rectal exam was performed: normal tone, no palpable lesions. At this point, a forward viewing Olympus colonoscope was inserted into the anus and carefully advanced to the terminal ileum. The cecum was identified by the ileocecal valve and the appendiceal orifice. The colonoscope was then slowly withdrawn with careful inspection of the mucosa in a linear and circumferential fashion. The scope was retroflexed in the rectum.  Suction was utilized during the procedure to remove as much air as possible from the bowel. The colonoscope was removed from the patient, and the procedure was terminated. Findings are listed below. Findings:   Small perianal tags without any bleeding stigmata and small internal hemorrhoids. Otherwise digital rectal exam was normal.    The mucosa appeared normal throughout the entire examined colon and examined terminal ileum. Mild diverticulosis in the left colon. Small nonbleeding internal hemorrhoids without any bleeding stigmata. Retroflexion in the rectum otherwise was normal and revealed no further abnormalities     Recommendations:  1. Repeat colonoscopy: Due to her personal history of polyps, in 5 years  2.- Resume previous meds and diet  - GI clinic f/u PRN   - Keep scheduled f/u appts with other MDs     Findings and recommendations were discussed w/ the patient. A copy of the images was provided.     Shamika Gilman MD, MD  6/28/2021  9:44 AM

## 2021-06-28 NOTE — ANESTHESIA PRE PROCEDURE
Department of Anesthesiology  Preprocedure Note       Name:  Ab Mcmahon   Age:  63 y.o.  :  1965                                          MRN:  [id number]         Date:  2021      Surgeon: Everette Garza):  Elisha Gomez MD    Procedure: Procedure(s):  COLORECTAL CANCER SCREENING, NOT HIGH RISK    Medications prior to admission:   Prior to Admission medications    Medication Sig Start Date End Date Taking? Authorizing Provider   vitamin D (ERGOCALCIFEROL) 1.25 MG (11080 UT) CAPS capsule Take 1 capsule by mouth once a week 21  Yes Missy Zepeda MD   Semaglutide,0.25 or 0.5MG/DOS, (OZEMPIC, 0.25 OR 0.5 MG/DOSE,) 2 MG/1.5ML SOPN Inject into the skin   Yes Historical Provider, MD   lisinopril-hydroCHLOROthiazide (PRINZIDE;ZESTORETIC) 10-12.5 MG per tablet Take 1 tablet by mouth daily No additional refills until seen in office 21 Yes Missy Zepeda MD   Semaglutide, 1 MG/DOSE, 2 MG/1.5ML SOPN Inject 1 mg into the skin once a week 21  Yes Missy Zepeda MD   citalopram (CELEXA) 20 MG tablet TAKE 1 TABLET BY MOUTH EVERY DAY 21   Missy Zepeda MD   blood glucose monitor strips Test 3 times a day & as needed for symptoms of irregular blood glucose. Dispense sufficient amount for indicated testing frequency plus additional to accommodate PRN testing needs. 20   JEN Lyn - NP   [facility]) 2 % OINT Apply 1 applicator topically daily 20   JEN López       Current medications:    Current Facility-Administered Medications   Medication Dose Route Frequency Provider Last Rate Last Admin    lactated ringers infusion   Intravenous Continuous Elisha Gomez  mL/hr at 21 New Bag at 21       Allergies:     Allergies   Allergen Reactions    Amoxil [Amoxicillin Trihydrate] Rash       Problem List:    Patient Active Problem List   Diagnosis Code    Mitral valve prolapse I34.1    NAFLD (nonalcoholic fatty liver disease) K76.0    Family history of esophageal cancer Z80.0    Vitamin D deficiency E55.9       Past Medical History:        Diagnosis Date    ASCUS of cervix with negative high risk HPV 2017    BV (bacterial vaginosis)     HPV (human papilloma virus) infection     Hypertension     Mitral valve prolapse     [facility]bid [facility] [address] [address]     Seasonal depression ([facility] [address]        Past Surgical History:        Procedure Laterality Date     SECTION      fetal decels/distress, baby was fine    CHOLECYSTECTOMY      lap patsy by Dr. Nury Swift      [facility]  2018    Madeleine Driscoll N/A 2020    EXAM UNDER ANESTHESIA, HYSTEROSCOPY, Earvin Sermons, DILATION AND CURETTAGE performed by Julius Baumann MD at [facility] ENDOSCOPY  12    Dr. Ileana Fatima, negative JOANNA testing       Social History:    Social History     Tobacco Use    Smoking status: Former Smoker     Packs/day: 0.25     Years: 4.00     Pack years: 1.00     Start date: 1980     Quit date: 2001     Years since quittin.6    Smokeless tobacco: Never Used    Tobacco comment: used to bum cigarettes off people with social drinking events, quit several years ago   Substance Use Topics    Alcohol use: Yes     Comment: social                                Counseling given: Not Answered  Comment: used to bum cigarettes off people with social drinking events, quit several years ago      Vital Signs (Current):   Vitals:    21 0909   BP: 128/88   Pulse: 79   Resp: 18   Temp: 98.9 °F (37.2 °C)   TempSrc: Temporal   SpO2: 100%   Weight: 250 lb (113.4 kg)   Height: 5' 7\" (1.702 m)                                              BP Readings from Last 3 Encounters:   21 128/88   21 118/80   20 112/82       NPO Status: Time of last liquid consumption: 0500                        Time of last solid consumption: 1600                        Date of last liquid consumption: 06/28/21                        Date of last solid food consumption: 06/26/21    BMI:   Wt Readings from Last 3 Encounters:   06/28/21 250 lb (113.4 kg)   04/28/21 254 lb (115.2 kg)   05/27/20 254 lb (115.2 kg)     Body mass index is 39.16 kg/m². CBC:   Lab Results   Component Value Date    WBC 6.6 04/28/2021    RBC 5.39 04/28/2021    HGB 14.5 04/28/2021    HCT 45.8 04/28/2021    MCV 85.0 04/28/2021    RDW 13.2 04/28/2021     04/28/2021       CMP:   Lab Results   Component Value Date     04/28/2021    K 4.1 04/28/2021    CL 99 04/28/2021    CO2 26 04/28/2021    BUN 8 04/28/2021    CREATININE 0.8 04/28/2021    GFRAA >59 04/28/2021    LABGLOM >60 04/28/2021    GLUCOSE 164 04/28/2021    PROT 8.2 04/28/2021    PROT 7.5 12/04/2012    CALCIUM 9.2 04/28/2021    BILITOT 0.6 04/28/2021    ALKPHOS 70 04/28/2021    AST 51 04/28/2021    ALT 47 04/28/2021       POC Tests: No results for input(s): POCGLU, POCNA, POCK, POCCL, POCBUN, POCHEMO, POCHCT in the last 72 hours.     Coags: No results found for: PROTIME, INR, APTT    HCG (If Applicable):   Lab Results   Component Value Date    PREGTESTUR Negative 06/28/2021        ABGs: No results found for: PHART, PO2ART, JSB0RZL, GDE8AHT, BEART, D1NVZQMQ     Type & Screen (If Applicable):  No results found for: LABABO, LABRH    Drug/Infectious Status (If Applicable):  No results found for: HIV, HEPCAB    COVID-19 Screening (If Applicable):   Lab Results   Component Value Date    COVID19 Not Detected 05/19/2020           Anesthesia Evaluation  Patient summary reviewed and Nursing notes reviewed no history of anesthetic complications:   Airway: Mallampati: II     Neck ROM: full  Mouth opening: > = 3 FB Dental:          Pulmonary:       (-) not a current smoker (former smoker)                           Cardiovascular:    (+) hypertension:, valvular problems/murmurs: MVP,         Rhythm: regular  Rate: normal           Beta Blocker:  Not on Beta Blocker         Neuro/Psych:   (+) psychiatric history:depression/anxiety             GI/Hepatic/Renal:   (+) liver disease (NAFLD):, morbid obesity          Endo/Other:                     Abdominal:   (+) obese,           Vascular: Other Findings:             Anesthesia Plan      general and TIVA     ASA 3       Induction: intravenous. Anesthetic plan and risks discussed with patient. Plan discussed with CRNA.                   JEN Craig - CRNA   6/28/2021

## 2021-07-28 ENCOUNTER — TELEPHONE (OUTPATIENT)
Dept: OBGYN CLINIC | Age: 56
End: 2021-07-28

## 2021-07-28 RX ORDER — FLUCONAZOLE 150 MG/1
150 TABLET ORAL ONCE
Qty: 2 TABLET | Refills: 0 | Status: SHIPPED | OUTPATIENT
Start: 2021-07-28 | End: 2021-07-28

## 2021-07-28 NOTE — TELEPHONE ENCOUNTER
Amie Hylton is calling for a medication that is not listed in her chart. She is needing medication for a Yeast Infection    Last office visit: Visit date not found  Next office visit: 9/24/2021   Preferred Pharmacy: [address] [place] [address]    Please call patient to clarify. Thank You.

## 2021-09-01 ENCOUNTER — OFFICE VISIT (OUTPATIENT)
Dept: INTERNAL MEDICINE | Age: 56
End: 2021-09-01
Payer: COMMERCIAL

## 2021-09-01 VITALS
HEIGHT: 67 IN | BODY MASS INDEX: 41.12 KG/M2 | OXYGEN SATURATION: 98 % | HEART RATE: 73 BPM | DIASTOLIC BLOOD PRESSURE: 70 MMHG | SYSTOLIC BLOOD PRESSURE: 118 MMHG | WEIGHT: 262 LBS | RESPIRATION RATE: 18 BRPM

## 2021-09-01 DIAGNOSIS — E53.8 B12 DEFICIENCY: ICD-10-CM

## 2021-09-01 DIAGNOSIS — G47.33 OSA ON CPAP: ICD-10-CM

## 2021-09-01 DIAGNOSIS — E55.9 VITAMIN D DEFICIENCY: ICD-10-CM

## 2021-09-01 DIAGNOSIS — E11.9 TYPE 2 DIABETES MELLITUS WITHOUT COMPLICATION, WITHOUT LONG-TERM CURRENT USE OF INSULIN (HCC): Primary | ICD-10-CM

## 2021-09-01 DIAGNOSIS — E66.01 MORBID OBESITY (HCC): ICD-10-CM

## 2021-09-01 DIAGNOSIS — E66.09 EXOGENOUS OBESITY: ICD-10-CM

## 2021-09-01 DIAGNOSIS — Z11.59 NEED FOR HEPATITIS C SCREENING TEST: ICD-10-CM

## 2021-09-01 DIAGNOSIS — Z99.89 OSA ON CPAP: ICD-10-CM

## 2021-09-01 DIAGNOSIS — K76.0 NAFLD (NONALCOHOLIC FATTY LIVER DISEASE): ICD-10-CM

## 2021-09-01 PROCEDURE — 3052F HG A1C>EQUAL 8.0%<EQUAL 9.0%: CPT | Performed by: INTERNAL MEDICINE

## 2021-09-01 PROCEDURE — 99214 OFFICE O/P EST MOD 30 MIN: CPT | Performed by: INTERNAL MEDICINE

## 2021-09-01 PROCEDURE — 3017F COLORECTAL CA SCREEN DOC REV: CPT | Performed by: INTERNAL MEDICINE

## 2021-09-01 PROCEDURE — 2022F DILAT RTA XM EVC RTNOPTHY: CPT | Performed by: INTERNAL MEDICINE

## 2021-09-01 PROCEDURE — G8417 CALC BMI ABV UP PARAM F/U: HCPCS | Performed by: INTERNAL MEDICINE

## 2021-09-01 PROCEDURE — 1036F TOBACCO NON-USER: CPT | Performed by: INTERNAL MEDICINE

## 2021-09-01 PROCEDURE — G8427 DOCREV CUR MEDS BY ELIG CLIN: HCPCS | Performed by: INTERNAL MEDICINE

## 2021-09-01 ASSESSMENT — ENCOUNTER SYMPTOMS
CONSTIPATION: 0
SORE THROAT: 0
WHEEZING: 0
CHEST TIGHTNESS: 0
ABDOMINAL PAIN: 0
COUGH: 0

## 2021-09-01 NOTE — PROGRESS NOTES
Chief Complaint:   Matt Uribe is a 63 y.o. female who presents forcomplete physical exam.    History of Present Illness: Matt Uribe is a 63 y.o. female who presents today  follow up on her chronic medical conditions as noted below. Patient Active Problem List    Diagnosis Date Noted    Vitamin D deficiency 01/15/2016    NAFLD (nonalcoholic fatty liver disease) 2012    Family history of esophageal cancer 2012    Mitral valve prolapse        Past Medical History:   Diagnosis Date    ASCUS of cervix with negative high risk HPV 2017    BV (bacterial vaginosis)     HPV (human papilloma virus) infection     Hypertension     Mitral valve prolapse     Morbid obesity ([address]     Seasonal depression ([facility] [address]        Past Surgical History:   Procedure Laterality Date     SECTION      fetal decels/distress, baby was fine    CHOLECYSTECTOMY      lap patsy by Dr. Ashtyn Santoro  2012    Nonah Ping 2021    Dr Reji López, Sm perianal tags wo bleeding, sm nonbleeding int hemorrhoids, Mild diverticulosis, 5 year recall    COLPOSCOPY  2018    DILATION AND CURETTAGE OF UTERUS N/A 2020    EXAM UNDER ANESTHESIA, HYSTEROSCOPY, MYOSURE, DILATION AND CURETTAGE performed by Honey Mcclellan MD at [address]  2012    Dr. Natalie Barrera, negative JOANNA testing       Current Outpatient Medications   Medication Sig Dispense Refill    vitamin D (ERGOCALCIFEROL) 1.25 MG (87398 UT) CAPS capsule Take 1 capsule by mouth once a week 12 capsule 1    lisinopril-hydroCHLOROthiazide (PRINZIDE;ZESTORETIC) 10-12.5 MG per tablet Take 1 tablet by mouth daily No additional refills until seen in office 90 tablet 1    citalopram (CELEXA) 20 MG tablet TAKE 1 TABLET BY MOUTH EVERY DAY 90 tablet 1    blood glucose monitor strips Test 3 times a day & as needed for symptoms of irregular blood glucose.  Dispense sufficient amount for indicated testing frequency plus additional to accommodate PRN testing needs. 100 strip 2    Crisaborole (EUCRISA) 2 % OINT Apply 1 applicator topically daily 60 g 1    Semaglutide, 1 MG/DOSE, 2 MG/1.5ML SOPN Inject 1 mg into the skin once a week (Patient not taking: Reported on 2021) 2 pen 1     No current facility-administered medications for this visit. Allergies   Allergen Reactions    Amoxil [Amoxicillin Trihydrate] Rash       Social History     Socioeconomic History    Marital status: Single     Spouse name: None    Number of children: 2    Years of education: None    Highest education level: None   Occupational History    Occupation: [place]   Tobacco Use    Smoking status: Former Smoker     Packs/day: 0.25     Years: 4.00     Pack years: 1.00     Start date: 1980     Quit date: 2001     Years since quittin.8    Smokeless tobacco: Never Used    Tobacco comment: used to bum cigarettes off people with social drinking events, quit several years ago   Substance and Sexual Activity    Alcohol use: Yes     Comment: social    Drug use: No    Sexual activity: Yes     Partners: Male   Other Topics Concern    None   Social History Narrative    None     Social Determinants of Health     Financial Resource Strain: Low Risk     Difficulty of Paying Living Expenses: Not hard at all   Food Insecurity: No Food Insecurity    Worried About Running Out of Food in the Last Year: Never true    Peggy of Food in the Last Year: Never true   Transportation Needs: No Transportation Needs    Lack of Transportation (Medical): No    Lack of Transportation (Non-Medical):  No   Physical Activity:     Days of Exercise per Week:     Minutes of Exercise per Session:    Stress:     Feeling of Stress :    Social Connections:     Frequency of Communication with Friends and Family:     Frequency of Social Gatherings with Friends and Family:     Attends Jehovah's witness Services:     Active Member of Clubs or Organizations:     Attends Club or Organization Meetings:     Marital Status:    Intimate Partner Violence:     Fear of Current or Ex-Partner:     Emotionally Abused:     Physically Abused:     Sexually Abused:      Family History   Problem Relation Age of Onset    Hypertension Mother     Cancer Maternal Grandfather         questionable esophageal cancer          Past Surgical History:   Procedure Laterality Date     SECTION      fetal decels/distress, baby was fine    CHOLECYSTECTOMY      lap patsy by Dr. Mary Jane Middleton  2012    Lorrane Maizes 2021    Dr Akash Valles, Sm perianal tags wo bleeding, sm nonbleeding int hemorrhoids, Mild diverticulosis, 5 year recall    COLPOSCOPY  2018    DILATION AND CURETTAGE OF UTERUS N/A 2020    EXAM UNDER ANESTHESIA, HYSTEROSCOPY, MYOSURE, DILATION AND CURETTAGE performed by Ana Cuevas MD at [facility]  2012    Dr. Prema Kuhn, negative JOANNA testing         Lab Review   Orders Only on 2021   Component Date Value    Sodium 2021 138     Potassium 2021 4.0     Chloride 2021 99     CO2 2021 25     Anion Gap 2021 14     Glucose 2021 230*    BUN 2021 10     CREATININE 2021 0.7     GFR Non- 2021 >60     GFR  2021 >59     Calcium 2021 9.2     Total Protein 2021 8.2     Albumin 2021 4.3     Total Bilirubin 2021 0.4     Alkaline Phosphatase 2021 69     ALT 2021 53*    AST 2021 51*    Hemoglobin A1C 2021 8.9*    Vit D, 25-Hydroxy 2021 33.3          Review of Systems   Constitutional: Negative for chills, fatigue and fever. HENT: Negative for congestion, ear pain, nosebleeds, postnasal drip and sore throat. Respiratory: Negative for cough, chest tightness and wheezing.     Cardiovascular: Negative for chest pain, palpitations and leg swelling. Gastrointestinal: Negative for abdominal pain and constipation. Genitourinary: Negative for dysuria and urgency. Musculoskeletal: Negative. Negative for arthralgias. Skin: Negative for rash. Neurological: Negative for dizziness and headaches. Psychiatric/Behavioral: Negative. Vitals:    09/01/21 0735   BP: 118/70   Site: Left Upper Arm   Position: Sitting   Cuff Size: Large Adult   Pulse: 73   Resp: 18   SpO2: 98%   Weight: 262 lb (118.8 kg)   Height: 5' 7\" (1.702 m)      Wt Readings from Last 3 Encounters:   09/01/21 262 lb (118.8 kg)   06/28/21 250 lb (113.4 kg)   04/28/21 254 lb (115.2 kg)   Body mass index is 41.04 kg/m². BP Readings from Last 3 Encounters:   09/01/21 118/70   06/28/21 109/74   06/28/21 (!) 141/71       Physical Exam  Constitutional:       Appearance: She is well-developed. She is obese. HENT:      Right Ear: External ear normal.      Left Ear: External ear normal.      Mouth/Throat:      Pharynx: No oropharyngeal exudate. Eyes:      Conjunctiva/sclera: Conjunctivae normal.      Pupils: Pupils are equal, round, and reactive to light. Neck:      Thyroid: No thyromegaly. Vascular: No JVD. Cardiovascular:      Rate and Rhythm: Normal rate. Heart sounds: Normal heart sounds. No murmur heard. Pulmonary:      Effort: No respiratory distress. Breath sounds: Normal breath sounds. No wheezing or rales. Chest:      Chest wall: No tenderness. Abdominal:      General: Bowel sounds are normal.      Palpations: Abdomen is soft. Musculoskeletal:      Cervical back: Neck supple. Lymphadenopathy:      Cervical: No cervical adenopathy. Skin:     Findings: No rash.            ASSESSMENT/PLAN      Type 2 diabetes mellitus   Hemoglobin A1c significantly elevated at 8.9 in 9/2021  Prior  Hemoglobin A1c 4/28/2021 is 6.8    She was supposed to start Ozempic back in April 2021 but never did  Recommendations today in 9/2021  1. Initiate Ozempic ASAP  Starter kit, Rx  She will be taking 0.25 x 1 weeks and then 0.5 x 2 weeks and then increase to 1 mg dose  2. Follow-up here in 5 weeks with blood sugar readings  Start checking sugars twice opal on 9/22/2021  Essential hypertension  Blood pressure readings reviewed  Blood pressure well controlled  Rx lisinopril 10/12.5 p.o. daily       Morbid obesity  Nonalcoholic fatty liver disease  Transaminase elevation  ALT 53 in 9/2021 ( 47 )  AST 51 in 9/2021 ( 51)     Rx for Ozempic as above  Pt was givencounseling about diet and exercise including education on what calories are, where calories come from, the need for portion control,following lower carbohydrate dietary regimen and healthy snacks along side an active lifestyle with supplementary exercise of approx 30 minutes a week, 5 days a week of exercise for weight loss. The patient voiced increased understanding of the topics discussed.       Vitamin D deficiency  I have personally reviewed and interpreted these lab results and thoroughly discussed with patient  Vitamin D level is 33 in 9/2021 ( was 11 in 4/2021 )     B12 deficiency  B12 level is 795 in 4/2021       Seasonal depression ([facility] [address]  Has been doing well on celexa  RX celexa 20 mg daily     CPAP with nasal mask  She is compliant with her CPAP and benefits form CPAP  Needs CPAP supplies     HM  Cscope 6/2021 repeat 5 yrs   DM eye 3/2021  Bone density- plan with next mammogram  Mammo + pap- per gyn       Orders Placed This Encounter   Procedures    Hepatitis C Antibody    Hemoglobin A1C    Comprehensive Metabolic Panel    Vitamin D 25 Hydroxy    Vitamin B12     New Prescriptions    No medications on file      There are no Patient Instructions on file for this visit. No follow-ups on file. EMR Dragon/transcription disclaimer:Significant part of this  encounter note is electronic transcription/translation of spoken language to printed text.  The electronic translation of spoken language may beerroneous, or at times, nonsensical words or phrases may be inadvertently transcribed.  Although I have reviewed the note for such errors, some may still exist.

## 2021-09-30 ENCOUNTER — OFFICE VISIT (OUTPATIENT)
Dept: INTERNAL MEDICINE | Age: 56
End: 2021-09-30
Payer: COMMERCIAL

## 2021-09-30 VITALS
OXYGEN SATURATION: 98 % | SYSTOLIC BLOOD PRESSURE: 112 MMHG | WEIGHT: 258 LBS | BODY MASS INDEX: 40.41 KG/M2 | HEART RATE: 76 BPM | RESPIRATION RATE: 18 BRPM | DIASTOLIC BLOOD PRESSURE: 70 MMHG

## 2021-09-30 DIAGNOSIS — K76.0 NAFLD (NONALCOHOLIC FATTY LIVER DISEASE): ICD-10-CM

## 2021-09-30 DIAGNOSIS — I10 ESSENTIAL HYPERTENSION: ICD-10-CM

## 2021-09-30 DIAGNOSIS — E66.01 MORBID OBESITY (HCC): ICD-10-CM

## 2021-09-30 DIAGNOSIS — E11.9 TYPE 2 DIABETES MELLITUS WITHOUT COMPLICATION, WITHOUT LONG-TERM CURRENT USE OF INSULIN (HCC): Primary | ICD-10-CM

## 2021-09-30 PROCEDURE — 1036F TOBACCO NON-USER: CPT | Performed by: INTERNAL MEDICINE

## 2021-09-30 PROCEDURE — G8417 CALC BMI ABV UP PARAM F/U: HCPCS | Performed by: INTERNAL MEDICINE

## 2021-09-30 PROCEDURE — 3017F COLORECTAL CA SCREEN DOC REV: CPT | Performed by: INTERNAL MEDICINE

## 2021-09-30 PROCEDURE — G8428 CUR MEDS NOT DOCUMENT: HCPCS | Performed by: INTERNAL MEDICINE

## 2021-09-30 PROCEDURE — 99213 OFFICE O/P EST LOW 20 MIN: CPT | Performed by: INTERNAL MEDICINE

## 2021-09-30 PROCEDURE — 2022F DILAT RTA XM EVC RTNOPTHY: CPT | Performed by: INTERNAL MEDICINE

## 2021-09-30 PROCEDURE — 3052F HG A1C>EQUAL 8.0%<EQUAL 9.0%: CPT | Performed by: INTERNAL MEDICINE

## 2021-09-30 ASSESSMENT — ENCOUNTER SYMPTOMS
ABDOMINAL PAIN: 0
CONSTIPATION: 0
SORE THROAT: 0
COUGH: 0
WHEEZING: 0
CHEST TIGHTNESS: 0

## 2021-09-30 NOTE — PROGRESS NOTES
Chief Complaint:   Angelica eTixeira is a 63 y.o. female who presents forcomplete physical exam.    History of Present Illness: Angelica Teixeira is a 63 y.o. female who presents todayfor wellness visit AND follow up on her chronic medical conditions as noted below.       Patient Active Problem List    Diagnosis Date Noted    Vitamin D deficiency 01/15/2016    NAFLD (nonalcoholic fatty liver disease) 2012    Family history of esophageal cancer 2012    Mitral valve prolapse        Past Medical History:   Diagnosis Date    ASCUS of cervix with negative high risk HPV 2017    BV (bacterial vaginosis)     HPV (human papilloma virus) infection     Hypertension     Mitral valve prolapse     Morbid obesity ([address]     Seasonal depression ([facility] [address]        Past Surgical History:   Procedure Laterality Date     SECTION      fetal decels/distress, baby was fine    CHOLECYSTECTOMY      lap patsy by Dr. Catrachita Turk  2012    Yoshi Double N/A 2021    Dr Fallon Alvarez, Sm perianal tags wo bleeding, sm nonbleeding int hemorrhoids, Mild diverticulosis, 5 year recall    COLPOSCOPY  2018    DILATION AND CURETTAGE OF UTERUS N/A 2020    EXAM UNDER ANESTHESIA, HYSTEROSCOPY, MYOSURE, DILATION AND CURETTAGE performed by Simón Dailey MD at [address]  2012    Dr. Mayuri Carter, negative JOANNA testing       Current Outpatient Medications   Medication Sig Dispense Refill    vitamin D (ERGOCALCIFEROL) 1.25 MG (70618 UT) CAPS capsule Take 1 capsule by mouth once a week 12 capsule 1    lisinopril-hydroCHLOROthiazide (PRINZIDE;ZESTORETIC) 10-12.5 MG per tablet Take 1 tablet by mouth daily No additional refills until seen in office 90 tablet 1    citalopram (CELEXA) 20 MG tablet TAKE 1 TABLET BY MOUTH EVERY DAY 90 tablet 1    Semaglutide, 1 MG/DOSE, 2 MG/1.5ML SOPN Inject 1 mg into the skin once a week (Patient not taking: Reported on 2021) 2 pen 1    blood glucose monitor strips Test 3 times a day & as needed for symptoms of irregular blood glucose. Dispense sufficient amount for indicated testing frequency plus additional to accommodate PRN testing needs. 100 strip 2    Crisaborole (EUCRISA) 2 % OINT Apply 1 applicator topically daily 60 g 1     No current facility-administered medications for this visit. Allergies   Allergen Reactions    Amoxil [Amoxicillin Trihydrate] Rash       Social History     Socioeconomic History    Marital status: Single     Spouse name: None    Number of children: 2    Years of education: None    Highest education level: None   Occupational History    Occupation: [place]   Tobacco Use    Smoking status: Former Smoker     Packs/day: 0.25     Years: 4.00     Pack years: 1.00     Start date: 1980     Quit date: 2001     Years since quittin.9    Smokeless tobacco: Never Used    Tobacco comment: used to bum cigarettes off people with social drinking events, quit several years ago   Substance and Sexual Activity    Alcohol use: Yes     Comment: social    Drug use: No    Sexual activity: Yes     Partners: Male   Other Topics Concern    None   Social History Narrative    None     Social Determinants of Health     Financial Resource Strain: Low Risk     Difficulty of Paying Living Expenses: Not hard at all   Food Insecurity: No Food Insecurity    Worried About Running Out of Food in the Last Year: Never true    Peggy of Food in the Last Year: Never true   Transportation Needs: No Transportation Needs    Lack of Transportation (Medical): No    Lack of Transportation (Non-Medical):  No   Physical Activity:     Days of Exercise per Week:     Minutes of Exercise per Session:    Stress:     Feeling of Stress :    Social Connections:     Frequency of Communication with Friends and Family:     Frequency of Social Gatherings with Friends and Family:     Attends Confucianist Services:  Active Member of Clubs or Organizations:     Attends Club or Organization Meetings:     Marital Status:    Intimate Partner Violence:     Fear of Current or Ex-Partner:     Emotionally Abused:     Physically Abused:     Sexually Abused:      Family History   Problem Relation Age of Onset    Hypertension Mother     Cancer Maternal Grandfather         questionable esophageal cancer          Past Surgical History:   Procedure Laterality Date     SECTION      fetal decels/distress, baby was fine    CHOLECYSTECTOMY      lap patsy by Dr. Kell Jain  2012    [place] Noun 2021    Dr Cindia Schlatter, Sm perianal tags wo bleeding, sm nonbleeding int hemorrhoids, Mild diverticulosis, 5 year recall    COLPOSCOPY  2018    DILATION AND CURETTAGE OF UTERUS N/A 2020    EXAM UNDER ANESTHESIA, HYSTEROSCOPY, MYOSURE, DILATION AND CURETTAGE performed by Kim Ramirez MD at [address]  2012    Dr. Aditya Jalloh, negative JOANNA testing         Lab Review   Orders Only on 2021   Component Date Value    Sodium 2021 138     Potassium 2021 4.0     Chloride 2021 99     CO2 2021 25     Anion Gap 2021 14     Glucose 2021 230*    BUN 2021 10     CREATININE 2021 0.7     GFR Non- 2021 >60     GFR  2021 >59     Calcium 2021 9.2     Total Protein 2021 8.2     Albumin 2021 4.3     Total Bilirubin 2021 0.4     Alkaline Phosphatase 2021 69     ALT 2021 53*    AST 2021 51*    Hemoglobin A1C 2021 8.9*    Vit D, 25-Hydroxy 2021 33.3          Review of Systems   Constitutional: Negative for chills, fatigue and fever. HENT: Negative for congestion, ear pain, nosebleeds, postnasal drip and sore throat. Respiratory: Negative for cough, chest tightness and wheezing.     Cardiovascular: Negative for chest pain, palpitations and leg swelling. Gastrointestinal: Negative for abdominal pain and constipation. Genitourinary: Negative for dysuria and urgency. Musculoskeletal: Negative. Negative for arthralgias. Skin: Negative for rash. Neurological: Negative for dizziness and headaches. Psychiatric/Behavioral: Negative. Vitals:    09/30/21 1504   BP: 112/70   Site: Left Upper Arm   Position: Sitting   Cuff Size: Large Adult   Pulse: 76   Resp: 18   SpO2: 98%   Weight: 258 lb (117 kg)      Wt Readings from Last 3 Encounters:   09/30/21 258 lb (117 kg)   09/01/21 262 lb (118.8 kg)   06/28/21 250 lb (113.4 kg)   Body mass index is 40.41 kg/m². BP Readings from Last 3 Encounters:   09/30/21 112/70   09/01/21 118/70   06/28/21 109/74       Physical Exam  Constitutional:       Appearance: She is well-developed. HENT:      Right Ear: External ear normal.      Left Ear: External ear normal.      Mouth/Throat:      Pharynx: No oropharyngeal exudate. Eyes:      Conjunctiva/sclera: Conjunctivae normal.      Pupils: Pupils are equal, round, and reactive to light. Neck:      Thyroid: No thyromegaly. Vascular: No JVD. Cardiovascular:      Rate and Rhythm: Normal rate. Heart sounds: Normal heart sounds. No murmur heard. Pulmonary:      Effort: No respiratory distress. Breath sounds: Normal breath sounds. No wheezing or rales. Chest:      Chest wall: No tenderness. Abdominal:      General: Bowel sounds are normal.      Palpations: Abdomen is soft. Musculoskeletal:      Cervical back: Neck supple. Lymphadenopathy:      Cervical: No cervical adenopathy. Skin:     Findings: No rash.            ASSESSMENT/PLAN    Type 2 diabetes mellitus   Hemoglobin A1c significantly elevated at 8.9 in 9/2021  Prior  Hemoglobin A1c 4/28/2021 is 6.8     She was supposed to start Ozempic back in April 2021 but never did  started 9/1/2021  Sugar readings reviewed  They have improved but previously significantly elevated  Recommendations today  Rx Ozempic, now can increase to 1 mg injection weekly  Plan close follow-up here early December 2021 with lab testing prior to appointment    Essential hypertension  Blood pressure readings reviewed  Blood pressure well controlled  Rx lisinopril 10/12.5 p.o. daily    NAFLD  Morbid obesity  Healthy, mostly fiber rich nonstarchy plant-based diet recommended  Recommend to decrease intake of processed foods, simple carbohydrates and animal-based products that high in saturated fats    Pt was given counseling about diet and exercise including education on what calories are, where calories come from, the need for portion control,following lower carbohydrate dietary regimen and healthy snacks along side an active lifestyle with supplementary exercise of approx 30 minutes a week, 5 days a week of exercise for weight loss. The patient voiced increased understanding of the topics discussed.           No orders of the defined types were placed in this encounter. New Prescriptions    No medications on file      There are no Patient Instructions on file for this visit. No follow-ups on file. EMR Dragon/transcription disclaimer:Significant part of this  encounter note is electronic transcription/translation of spoken language to printed text. The electronic translation of spoken language may beerroneous, or at times, nonsensical words or phrases may be inadvertently transcribed.  Although I have reviewed the note for such errors, some may still exist.

## 2021-10-17 DIAGNOSIS — E55.9 VITAMIN D DEFICIENCY: ICD-10-CM

## 2021-10-18 RX ORDER — ERGOCALCIFEROL 1.25 MG/1
CAPSULE ORAL
Qty: 12 CAPSULE | Refills: 1 | Status: SHIPPED | OUTPATIENT
Start: 2021-10-18 | End: 2022-04-14 | Stop reason: SDUPTHER

## 2021-10-23 DIAGNOSIS — F41.8 DEPRESSION WITH ANXIETY: ICD-10-CM

## 2021-10-25 RX ORDER — CITALOPRAM 20 MG/1
TABLET ORAL
Qty: 90 TABLET | Refills: 1 | Status: SHIPPED | OUTPATIENT
Start: 2021-10-25 | End: 2022-09-23

## 2021-10-25 NOTE — TELEPHONE ENCOUNTER
Jaquelin Bradley called requesting a refill of the below medication which has been pended for you:     Requested Prescriptions     Pending Prescriptions Disp Refills    citalopram (CELEXA) 20 MG tablet [Pharmacy Med Name: CITALOPRAM HBR 20 MG TABLET] 90 tablet 1     Sig: TAKE 1 TABLET BY MOUTH EVERY DAY       Last Appointment Date: 9/30/2021  Next Appointment Date: 12/15/2021    Allergies   Allergen Reactions    Amoxil [Amoxicillin Trihydrate] Rash

## 2021-11-10 RX ORDER — LISINOPRIL AND HYDROCHLOROTHIAZIDE 12.5; 1 MG/1; MG/1
1 TABLET ORAL DAILY
Qty: 90 TABLET | Refills: 1 | Status: SHIPPED | OUTPATIENT
Start: 2021-11-10 | End: 2022-01-24

## 2021-11-10 NOTE — TELEPHONE ENCOUNTER
Lurena Schilder called requesting a refill of the below medication which has been pended for you:     Requested Prescriptions     Pending Prescriptions Disp Refills    lisinopril-hydroCHLOROthiazide (PRINZIDE;ZESTORETIC) 10-12.5 MG per tablet [Pharmacy Med Name: LISINOPRIL-HCTZ 10-12.5 MG TAB] 90 tablet 1     Sig: TAKE 1 TABLET BY MOUTH DAILY NO ADDITIONAL REFILLS UNTIL SEEN IN OFFICE       Last Appointment Date: 9/30/2021  Next Appointment Date: 12/15/2021    Allergies   Allergen Reactions    Amoxil [Amoxicillin Trihydrate] Rash

## 2021-11-16 RX ORDER — SEMAGLUTIDE 1.34 MG/ML
INJECTION, SOLUTION SUBCUTANEOUS
Qty: 3 ML | Refills: 1 | Status: SHIPPED | OUTPATIENT
Start: 2021-11-16 | End: 2021-12-15 | Stop reason: SDUPTHER

## 2021-11-16 NOTE — TELEPHONE ENCOUNTER
Ricarda Loyola called requesting a refill of the below medication which has been pended for you:     Requested Prescriptions     Pending Prescriptions Disp Refills    OZEMPIC, 1 MG/DOSE, 4 MG/3ML SOPN [Pharmacy Med Name: OZEMPIC 1 MG/DOSE (4 MG/3 ML)] 3 mL 1     Sig: INJECT 1 MG INTO THE SKIN ONCE WEEKLY       Last Appointment Date: 9/30/2021  Next Appointment Date: 12/15/2021    Allergies   Allergen Reactions    Amoxil [Amoxicillin Trihydrate] Rash

## 2021-12-13 DIAGNOSIS — E11.9 TYPE 2 DIABETES MELLITUS WITHOUT COMPLICATION, WITHOUT LONG-TERM CURRENT USE OF INSULIN (HCC): ICD-10-CM

## 2021-12-13 DIAGNOSIS — Z11.59 NEED FOR HEPATITIS C SCREENING TEST: ICD-10-CM

## 2021-12-13 DIAGNOSIS — E66.09 EXOGENOUS OBESITY: ICD-10-CM

## 2021-12-13 DIAGNOSIS — K76.0 NAFLD (NONALCOHOLIC FATTY LIVER DISEASE): ICD-10-CM

## 2021-12-13 DIAGNOSIS — E53.8 B12 DEFICIENCY: ICD-10-CM

## 2021-12-13 LAB
ALBUMIN SERPL-MCNC: 4.1 G/DL (ref 3.5–5.2)
ALP BLD-CCNC: 66 U/L (ref 35–104)
ALT SERPL-CCNC: 41 U/L (ref 5–33)
ANION GAP SERPL CALCULATED.3IONS-SCNC: 11 MMOL/L (ref 7–19)
AST SERPL-CCNC: 34 U/L (ref 5–32)
BILIRUB SERPL-MCNC: 0.4 MG/DL (ref 0.2–1.2)
BUN BLDV-MCNC: 13 MG/DL (ref 6–20)
CALCIUM SERPL-MCNC: 9.2 MG/DL (ref 8.6–10)
CHLORIDE BLD-SCNC: 99 MMOL/L (ref 98–111)
CO2: 27 MMOL/L (ref 22–29)
CREAT SERPL-MCNC: 0.8 MG/DL (ref 0.5–0.9)
GFR AFRICAN AMERICAN: >59
GFR NON-AFRICAN AMERICAN: >60
GLUCOSE BLD-MCNC: 148 MG/DL (ref 74–109)
HBA1C MFR BLD: 6.5 % (ref 4–6)
HEPATITIS C ANTIBODY INTERPRETATION: NORMAL
POTASSIUM SERPL-SCNC: 3.7 MMOL/L (ref 3.5–5)
SODIUM BLD-SCNC: 137 MMOL/L (ref 136–145)
TOTAL PROTEIN: 7.9 G/DL (ref 6.6–8.7)
VITAMIN B-12: 414 PG/ML (ref 211–946)
VITAMIN D 25-HYDROXY: 46.1 NG/ML

## 2021-12-15 ENCOUNTER — OFFICE VISIT (OUTPATIENT)
Dept: INTERNAL MEDICINE | Age: 56
End: 2021-12-15
Payer: COMMERCIAL

## 2021-12-15 VITALS
OXYGEN SATURATION: 98 % | BODY MASS INDEX: 39.16 KG/M2 | RESPIRATION RATE: 18 BRPM | SYSTOLIC BLOOD PRESSURE: 128 MMHG | DIASTOLIC BLOOD PRESSURE: 70 MMHG | WEIGHT: 250 LBS | HEART RATE: 76 BPM

## 2021-12-15 DIAGNOSIS — G47.33 OSA ON CPAP: ICD-10-CM

## 2021-12-15 DIAGNOSIS — Z99.89 OSA ON CPAP: ICD-10-CM

## 2021-12-15 DIAGNOSIS — E66.09 EXOGENOUS OBESITY: ICD-10-CM

## 2021-12-15 DIAGNOSIS — G47.9 RESTLESS SLEEPER: ICD-10-CM

## 2021-12-15 DIAGNOSIS — E55.9 VITAMIN D DEFICIENCY: ICD-10-CM

## 2021-12-15 DIAGNOSIS — E66.01 MORBID OBESITY (HCC): ICD-10-CM

## 2021-12-15 DIAGNOSIS — K76.0 NAFLD (NONALCOHOLIC FATTY LIVER DISEASE): ICD-10-CM

## 2021-12-15 DIAGNOSIS — I10 ESSENTIAL HYPERTENSION: ICD-10-CM

## 2021-12-15 DIAGNOSIS — E53.8 B12 DEFICIENCY: ICD-10-CM

## 2021-12-15 DIAGNOSIS — E11.9 TYPE 2 DIABETES MELLITUS WITHOUT COMPLICATION, WITHOUT LONG-TERM CURRENT USE OF INSULIN (HCC): Primary | ICD-10-CM

## 2021-12-15 PROCEDURE — 99214 OFFICE O/P EST MOD 30 MIN: CPT | Performed by: INTERNAL MEDICINE

## 2021-12-15 PROCEDURE — 1036F TOBACCO NON-USER: CPT | Performed by: INTERNAL MEDICINE

## 2021-12-15 PROCEDURE — G8417 CALC BMI ABV UP PARAM F/U: HCPCS | Performed by: INTERNAL MEDICINE

## 2021-12-15 PROCEDURE — 2022F DILAT RTA XM EVC RTNOPTHY: CPT | Performed by: INTERNAL MEDICINE

## 2021-12-15 PROCEDURE — 3044F HG A1C LEVEL LT 7.0%: CPT | Performed by: INTERNAL MEDICINE

## 2021-12-15 PROCEDURE — G8427 DOCREV CUR MEDS BY ELIG CLIN: HCPCS | Performed by: INTERNAL MEDICINE

## 2021-12-15 PROCEDURE — G8484 FLU IMMUNIZE NO ADMIN: HCPCS | Performed by: INTERNAL MEDICINE

## 2021-12-15 PROCEDURE — 3017F COLORECTAL CA SCREEN DOC REV: CPT | Performed by: INTERNAL MEDICINE

## 2021-12-15 RX ORDER — MINOCYCLINE HYDROCHLORIDE 100 MG/1
CAPSULE ORAL
COMMUNITY
Start: 2021-10-21 | End: 2022-04-14 | Stop reason: CLARIF

## 2021-12-15 RX ORDER — ROPINIROLE 0.25 MG/1
0.25 TABLET, FILM COATED ORAL NIGHTLY
Qty: 30 TABLET | Refills: 3 | Status: SHIPPED | OUTPATIENT
Start: 2021-12-15 | End: 2022-03-08

## 2021-12-15 RX ORDER — SEMAGLUTIDE 1.34 MG/ML
INJECTION, SOLUTION SUBCUTANEOUS
Qty: 9 ML | Refills: 1 | Status: SHIPPED | OUTPATIENT
Start: 2021-12-15 | End: 2022-04-14 | Stop reason: SDUPTHER

## 2021-12-15 ASSESSMENT — ENCOUNTER SYMPTOMS
SORE THROAT: 0
CHEST TIGHTNESS: 0
CONSTIPATION: 0
COUGH: 0
ABDOMINAL PAIN: 0
WHEEZING: 0

## 2021-12-15 NOTE — PROGRESS NOTES
Chief Complaint   Patient presents with    3 Month Follow-Up     History of presenting illness: Polina Mcallister is a63 y.o. female who presents today for follow up on her chronic medical conditions as noted below.     Patient Active Problem List    Diagnosis Date Noted    Vitamin D deficiency 01/15/2016    NAFLD (nonalcoholic fatty liver disease) 2012    Family history of esophageal cancer 2012    Mitral valve prolapse      Past Medical History:   Diagnosis Date    ASCUS of cervix with negative high risk HPV 2017    BV (bacterial vaginosis)     HPV (human papilloma virus) infection     Hypertension     Mitral valve prolapse     Morbid obesity ([address]     Seasonal depression ([address]       Past Surgical History:   Procedure Laterality Date     SECTION      fetal decels/distress, baby was fine    CHOLECYSTECTOMY      lap patsy by Dr. Gregor Cheatham  2012    Britney Lazo N/A 2021    Dr Gilbert Babcock, Sm perianal tags wo bleeding, sm nonbleeding int hemorrhoids, Mild diverticulosis, 5 year recall    COLPOSCOPY  2018    DILATION AND CURETTAGE OF UTERUS N/A 2020    EXAM UNDER ANESTHESIA, HYSTEROSCOPY, MYOSURE, DILATION AND CURETTAGE performed by Ann Armstrong MD at [place] [address]  2012    Dr. Edgar Hanley, negative JOANNA testing     Current Outpatient Medications   Medication Sig Dispense Refill    minocycline (MINOCIN;DYNACIN) 100 MG capsule       Semaglutide, 1 MG/DOSE, (OZEMPIC, 1 MG/DOSE,) 4 MG/3ML SOPN INJECT 1 MG INTO THE SKIN ONCE WEEKLY 9 mL 1    rOPINIRole (REQUIP) 0.25 MG tablet Take 1 tablet by mouth nightly 30 tablet 3    lisinopril-hydroCHLOROthiazide (PRINZIDE;ZESTORETIC) 10-12.5 MG per tablet TAKE 1 TABLET BY MOUTH DAILY NO ADDITIONAL REFILLS UNTIL SEEN IN OFFICE 90 tablet 1    citalopram (CELEXA) 20 MG tablet TAKE 1 TABLET BY MOUTH EVERY DAY 90 tablet 1    vitamin D (ERGOCALCIFEROL) 1.25 MG (70180 UT) CAPS capsule TAKE 1 CAPSULE BY MOUTH ONE TIME PER WEEK 12 capsule 1    blood glucose monitor strips Test 3 times a day & as needed for symptoms of irregular blood glucose. Dispense sufficient amount for indicated testing frequency plus additional to accommodate PRN testing needs. 100 strip 2     No current facility-administered medications for this visit. Allergies   Allergen Reactions    Amoxil [Amoxicillin Trihydrate] Rash     Social History     Tobacco Use    Smoking status: Former Smoker     Packs/day: 0.25     Years: 4.00     Pack years: 1.00     Start date: 1980     Quit date: 2001     Years since quittin.1    Smokeless tobacco: Never Used    Tobacco comment: used to bum cigarettes off people with social drinking events, quit several years ago   Substance Use Topics    Alcohol use: Yes     Comment: social      Family History   Problem Relation Age of Onset    Hypertension Mother     Cancer Maternal Grandfather         questionable esophageal cancer       Review of Systems   Constitutional: Negative for chills, fatigue and fever. HENT: Negative for congestion, ear pain, nosebleeds, postnasal drip and sore throat. Respiratory: Negative for cough, chest tightness and wheezing. Cardiovascular: Negative for chest pain, palpitations and leg swelling. Gastrointestinal: Negative for abdominal pain and constipation. Genitourinary: Negative for dysuria and urgency. Musculoskeletal: Negative. Negative for arthralgias. Skin: Negative for rash. Neurological: Negative for dizziness and headaches. Psychiatric/Behavioral: Positive for sleep disturbance. Vitals:    12/15/21 1535   BP: 128/70   Site: Left Upper Arm   Position: Sitting   Cuff Size: Large Adult   Pulse: 76   Resp: 18   SpO2: 98%   Weight: 250 lb (113.4 kg)     Body mass index is 39.16 kg/m². Physical Exam  Constitutional:       Appearance: She is well-developed.    HENT:      Right Ear: External daily    B12 level is 414  Suggest b complex vitamin daily    Vitamin D level is 55  Was very low at 11  Take vit d 50,000 iu weekly     NAFLD  Morbid obesity  12/2021 ALT is 41 and AST is 34 ( 53/51)  Healthy, mostly fiber rich nonstarchy plant-based diet recommended  Recommend to decrease intake of processed foods, simple carbohydrates and animal-based products that high in saturated fats     Pt was given counseling about diet and exercise including education on what calories are, where calories come from, the need for portion control,following lower carbohydrate dietary regimen and healthy snacks along side an active lifestyle with supplementary exercise of approx 30 minutes a week, 5 days a week of exercise for weight loss. The patient voiced increased understanding of the topics discussed.         AUDREY on CPAP  Describes restless sleep when she turns around frequently  Feels at times has trouble of restless legs as well  We will do trial with Requip  We will also obtain sleep study results from 2019 that were done by [facility]    Orders Placed This Encounter   Procedures    Hemoglobin A1C    Comprehensive Metabolic Panel    CBC Auto Differential    Lipid Panel    Microalbumin / Creatinine Urine Ratio    Urinalysis    TSH without Reflex    Vitamin D 25 Hydroxy     New Prescriptions    ROPINIROLE (REQUIP) 0.25 MG TABLET    Take 1 tablet by mouth nightly         Return in about 4 months (around 4/15/2022) for Annual Physical.   There are no Patient Instructions on file for this visit. EMR Dragon/transcription disclaimer:Significant part of this  encounter note is electronic transcription/translationof spoken language to printed text. The electronic translation of spoken language may be erroneous, or at times, nonsensical words or phrases may be inadvertently transcribed.  Although I have reviewed the note for sucherrors, some may still exist.

## 2022-01-24 ENCOUNTER — OFFICE VISIT (OUTPATIENT)
Dept: OBGYN CLINIC | Age: 57
End: 2022-01-24
Payer: COMMERCIAL

## 2022-01-24 VITALS
BODY MASS INDEX: 38.3 KG/M2 | DIASTOLIC BLOOD PRESSURE: 80 MMHG | SYSTOLIC BLOOD PRESSURE: 132 MMHG | WEIGHT: 244 LBS | HEIGHT: 67 IN

## 2022-01-24 DIAGNOSIS — Z12.4 SCREENING FOR CERVICAL CANCER: ICD-10-CM

## 2022-01-24 DIAGNOSIS — Z11.51 SCREENING FOR HPV (HUMAN PAPILLOMAVIRUS): ICD-10-CM

## 2022-01-24 DIAGNOSIS — Z12.31 ENCOUNTER FOR SCREENING MAMMOGRAM FOR BREAST CANCER: ICD-10-CM

## 2022-01-24 DIAGNOSIS — N89.8 VAGINAL DISCHARGE: ICD-10-CM

## 2022-01-24 DIAGNOSIS — Z01.419 ENCOUNTER FOR ANNUAL ROUTINE GYNECOLOGICAL EXAMINATION: Primary | ICD-10-CM

## 2022-01-24 PROCEDURE — G8484 FLU IMMUNIZE NO ADMIN: HCPCS | Performed by: OBSTETRICS & GYNECOLOGY

## 2022-01-24 PROCEDURE — 99396 PREV VISIT EST AGE 40-64: CPT | Performed by: OBSTETRICS & GYNECOLOGY

## 2022-01-24 RX ORDER — LISINOPRIL 10 MG/1
10 TABLET ORAL DAILY
COMMUNITY
End: 2022-07-07 | Stop reason: SDUPTHER

## 2022-01-24 ASSESSMENT — ENCOUNTER SYMPTOMS
RESPIRATORY NEGATIVE: 1
ALLERGIC/IMMUNOLOGIC NEGATIVE: 1
EYES NEGATIVE: 1
GASTROINTESTINAL NEGATIVE: 1

## 2022-01-24 NOTE — PROGRESS NOTES
Pt is here for breast exam and pap smear. Pt states since she was last here she is still having discharge. No odor.      Last [facility]:  2017 [facility]  Last pap smear:  2019  Contraception:  postmenopausal  :  2  Para:  2  AB:  0  Last bone density:  no  Last colonoscopy:  2021

## 2022-01-24 NOTE — PROGRESS NOTES
Sulaiman Clay is a 61 y.o.  who presents today for pap smear and breast exam.  She complains of heavy vaginal discharge that is odorless and is without irritation. Patient states that at night in bed she can feel it running out of her vagina between her buttocks. She feels like this is unusual for her and that something \"isn't right\". Review of Systems   Constitutional: Negative. HENT: Negative. Eyes: Negative. Respiratory: Negative. Cardiovascular: Negative. Gastrointestinal: Negative. Endocrine: Negative. Genitourinary: Positive for vaginal discharge. Musculoskeletal: Negative. Skin: Negative. Allergic/Immunologic: Negative. Neurological: Negative. Hematological: Negative. Psychiatric/Behavioral: Negative.         Past Medical History:   Diagnosis Date    ASCUS of cervix with negative high risk HPV 2017    BV (bacterial vaginosis)     HPV (human papilloma virus) infection     Hypertension     Mitral valve prolapse     Morbid obesity ([address]     Seasonal depression ([address]        Past Surgical History:   Procedure Laterality Date     SECTION      fetal decels/distress, baby was fine    CHOLECYSTECTOMY      lap patsy by Dr. Radha Cummings  2012    Lilian Madrigal N/A 2021    Dr Bettina Restrepo, Sm perianal tags wo bleeding, sm nonbleeding int hemorrhoids, Mild diverticulosis, 5 year recall    COLPOSCOPY  2018    DILATION AND CURETTAGE OF UTERUS N/A 2020    EXAM UNDER ANESTHESIA, HYSTEROSCOPY, MYOSURE, DILATION AND CURETTAGE performed by Sadia Leahy MD at [facility]  2012    Dr. Rosa Maria Magallon, negative JOANNA testing       Family History   Problem Relation Age of Onset    Hypertension Mother     Cancer Maternal Grandfather         questionable esophageal cancer       Social History     Socioeconomic History    Marital status: Single     Spouse name: Not on file    Number of children: 2    Years of education: Not on file    Highest education level: Not on file   Occupational History    Occupation: [employer]   Tobacco Use    Smoking status: Former Smoker     Packs/day: 0.25     Years: 4.00     Pack years: 1.00     Start date: 1980     Quit date: 2001     Years since quittin.2    Smokeless tobacco: Never Used    Tobacco comment: used to bum cigarettes off people with social drinking events, quit several years ago   Substance and Sexual Activity    Alcohol use: Yes     Comment: social    Drug use: No    Sexual activity: Yes     Partners: Male   Other Topics Concern    Not on file   Social History Narrative    Not on file     Social Determinants of Health     Financial Resource Strain: Low Risk     Difficulty of Paying Living Expenses: Not hard at all   Food Insecurity: No Food Insecurity    Worried About Running Out of Food in the Last Year: Never true    Peggy of Food in the Last Year: Never true   Transportation Needs: No Transportation Needs    Lack of Transportation (Medical): No    Lack of Transportation (Non-Medical):  No   Physical Activity:     Days of Exercise per Week: Not on file    Minutes of Exercise per Session: Not on file   Stress:     Feeling of Stress : Not on file   Social Connections:     Frequency of Communication with Friends and Family: Not on file    Frequency of Social Gatherings with Friends and Family: Not on file    Attends [employer] Services: Not on file    Active Member of Clubs or Organizations: Not on file    Attends Club or Organization Meetings: Not on file    Marital Status: Not on file   Intimate Partner Violence:     Fear of Current or Ex-Partner: Not on file    Emotionally Abused: Not on file    Physically Abused: Not on file    Sexually Abused: Not on file   Housing Stability:     Unable to Pay for Housing in the Last Year: Not on file    Number of Places Lived in the Last Year: Not on file    Unstable Housing in the Last Year: Not on file         Current Outpatient Medications:     lisinopril (PRINIVIL;ZESTRIL) 10 MG tablet, Take 10 mg by mouth daily, Disp: , Rfl:     Semaglutide, 1 MG/DOSE, (OZEMPIC, 1 MG/DOSE,) 4 MG/3ML SOPN, INJECT 1 MG INTO THE SKIN ONCE WEEKLY, Disp: 9 mL, Rfl: 1    rOPINIRole (REQUIP) 0.25 MG tablet, Take 1 tablet by mouth nightly, Disp: 30 tablet, Rfl: 3    citalopram (CELEXA) 20 MG tablet, TAKE 1 TABLET BY MOUTH EVERY DAY, Disp: 90 tablet, Rfl: 1    vitamin D (ERGOCALCIFEROL) 1.25 MG (15505 UT) CAPS capsule, TAKE 1 CAPSULE BY MOUTH ONE TIME PER WEEK, Disp: 12 capsule, Rfl: 1    blood glucose monitor strips, Test 3 times a day & as needed for symptoms of irregular blood glucose. Dispense sufficient amount for indicated testing frequency plus additional to accommodate PRN testing needs. , Disp: 100 strip, Rfl: 2    minocycline (MINOCIN;DYNACIN) 100 MG capsule, , Disp: , Rfl:     Allergies   Allergen Reactions    Amoxil [Amoxicillin Trihydrate] Rash       /80   Ht 5' 7\" (1.702 m)   Wt 244 lb (110.7 kg)   LMP 05/24/2020   BMI 38.22 kg/m²   Physical Exam  Constitutional:       General: She is not in acute distress. Appearance: Normal appearance. She is not ill-appearing, toxic-appearing or diaphoretic. HENT:      Head: Normocephalic and atraumatic. Eyes:      Extraocular Movements: Extraocular movements intact. Pulmonary:      Effort: Pulmonary effort is normal. No respiratory distress. Abdominal:      Palpations: Abdomen is soft. Tenderness: There is no abdominal tenderness. Genitourinary:     Vagina: Vaginal discharge present. Musculoskeletal:         General: No swelling or tenderness. Normal range of motion. Right lower leg: No edema. Left lower leg: No edema. Skin:     General: Skin is warm and dry. Findings: No rash. Neurological:      Mental Status: She is alert and oriented to person, place, and time.    Psychiatric: Attention and Perception: Attention and perception normal.         Mood and Affect: Mood and affect normal.         Speech: Speech normal.         Behavior: Behavior normal. Behavior is cooperative. Thought Content: Thought content normal.         Cognition and Memory: Cognition and memory normal.         Judgment: Judgment normal.               Assessment   Diagnosis Orders   1. Encounter for annual routine gynecological examination  Human papillomavirus (HPV) DNA probe thin prep high risk    PAP SMEAR   2. Screening for cervical cancer  Human papillomavirus (HPV) DNA probe thin prep high risk    PAP SMEAR   3. Screening for HPV (human papillomavirus)  Human papillomavirus (HPV) DNA probe thin prep high risk    PAP SMEAR   4. Encounter for screening mammogram for breast cancer  MARIETTA Screening Bilateral   5. Vaginal discharge         Plan     1. Pap smear and HPV  2. Mammogram order  3. Propath collected  4. RTC one year or prn     I Jose Enrique Claros, am scribing for and in the presence of Dr. Tavares Kruse. I, Dr. Tavares Kruse, personally performed the services described in this documentation as scribed by Jose Enrique Claros in my presence, and it is both accurate and complete.

## 2022-01-29 LAB
HPV COMMENT: ABNORMAL
HPV TYPE 16: DETECTED
HPV TYPE 18: NOT DETECTED
HPVOH (OTHER TYPES): DETECTED

## 2022-01-31 ENCOUNTER — TELEPHONE (OUTPATIENT)
Dept: OBGYN CLINIC | Age: 57
End: 2022-01-31

## 2022-01-31 DIAGNOSIS — B96.89 BV (BACTERIAL VAGINOSIS): Primary | ICD-10-CM

## 2022-01-31 DIAGNOSIS — N76.0 BV (BACTERIAL VAGINOSIS): Primary | ICD-10-CM

## 2022-01-31 RX ORDER — METRONIDAZOLE 500 MG/1
500 TABLET ORAL 2 TIMES DAILY
Qty: 14 TABLET | Refills: 0 | Status: SHIPPED | OUTPATIENT
Start: 2022-01-31 | End: 2022-02-07

## 2022-01-31 NOTE — TELEPHONE ENCOUNTER
----- Message from Luca Jackson MD sent at 1/31/2022  3:35 PM CST -----  Needs colpo.   Treat for BV

## 2022-01-31 NOTE — TELEPHONE ENCOUNTER
Called and informed pt that her pap indicates HPV+ and that she has BV. Colpo appt made, Flagyl 500 mg bid x 7 days sent to pharmacy.

## 2022-03-08 RX ORDER — ROPINIROLE 0.25 MG/1
0.25 TABLET, FILM COATED ORAL NIGHTLY
Qty: 90 TABLET | Refills: 1 | Status: SHIPPED | OUTPATIENT
Start: 2022-03-08 | End: 2022-04-14 | Stop reason: CLARIF

## 2022-03-08 NOTE — TELEPHONE ENCOUNTER
Karena Lewis called requesting a refill of the below medication which has been pended for you:     Requested Prescriptions     Pending Prescriptions Disp Refills    rOPINIRole (REQUIP) 0.25 MG tablet [Pharmacy Med Name: ROPINIROLE HCL 0.25 MG TABLET] 90 tablet 1     Sig: TAKE 1 TABLET BY MOUTH NIGHTLY       Last Appointment Date: 12/15/2021  Next Appointment Date: 4/14/2022    Allergies   Allergen Reactions    Amoxil [Amoxicillin Trihydrate] Rash

## 2022-03-11 ENCOUNTER — OFFICE VISIT (OUTPATIENT)
Dept: OBGYN CLINIC | Age: 57
End: 2022-03-11
Payer: COMMERCIAL

## 2022-03-11 VITALS
DIASTOLIC BLOOD PRESSURE: 78 MMHG | HEIGHT: 67 IN | BODY MASS INDEX: 38.3 KG/M2 | SYSTOLIC BLOOD PRESSURE: 128 MMHG | WEIGHT: 244 LBS

## 2022-03-11 DIAGNOSIS — N76.0 BV (BACTERIAL VAGINOSIS): ICD-10-CM

## 2022-03-11 DIAGNOSIS — Z01.812 PRE-PROCEDURE LAB EXAM: Primary | ICD-10-CM

## 2022-03-11 DIAGNOSIS — B97.7 HIGH RISK HPV INFECTION: ICD-10-CM

## 2022-03-11 DIAGNOSIS — B96.89 BV (BACTERIAL VAGINOSIS): ICD-10-CM

## 2022-03-11 LAB
CONTROL: NORMAL
PREGNANCY TEST URINE, POC: NORMAL

## 2022-03-11 PROCEDURE — 57454 BX/CURETT OF CERVIX W/SCOPE: CPT | Performed by: OBSTETRICS & GYNECOLOGY

## 2022-03-11 PROCEDURE — 81025 URINE PREGNANCY TEST: CPT | Performed by: OBSTETRICS & GYNECOLOGY

## 2022-03-11 RX ORDER — METRONIDAZOLE 500 MG/1
500 TABLET ORAL 2 TIMES DAILY
Qty: 14 TABLET | Refills: 0 | Status: SHIPPED | OUTPATIENT
Start: 2022-03-11 | End: 2022-03-18

## 2022-03-11 NOTE — PROGRESS NOTES
Pt is here for breast exam and pap smear.      Last mammogram:    Last pap smear: 2022   Contraception:    :  2  Para:  2  AB:  0  Last bone density:  na  Last colonoscopy:   2021

## 2022-03-11 NOTE — PROGRESS NOTES
Jelena Qiu is a 61 y.o.  who presents today for pap smear and breast exam.        Review of Systems    Past Medical History:   Diagnosis Date    ASCUS of cervix with negative high risk HPV 2017    BV (bacterial vaginosis)     HPV (human papilloma virus) infection     Hypertension     Mitral valve prolapse     Morbid obesity ([address]     Seasonal depression (H[facility])        Past Surgical History:   Procedure Laterality Date     SECTION      fetal decels/distress, baby was fine    CHOLECYSTECTOMY      lap patsy by Dr. Juvenal Zaman  2012    Miguel Ángel Lee N/A 2021    Dr Ximena Mobley, Sm perianal tags wo bleeding, sm nonbleeding int hemorrhoids, Mild diverticulosis, 5 year recall    COLPOSCOPY  2018    DILATION AND CURETTAGE OF UTERUS N/A 2020    EXAM UNDER ANESTHESIA, HYSTEROSCOPY, MYOSURE, DILATION AND CURETTAGE performed by Luca Jackson MD at [address]  2012    Dr. Kayley Coyne, negative JOANNA testing       Family History   Problem Relation Age of Onset    Hypertension Mother     Cancer Maternal Grandfather         questionable esophageal cancer       Social History     Socioeconomic History    Marital status: Single     Spouse name: Not on file    Number of children: 2    Years of education: Not on file    Highest education level: Not on file   Occupational History    Occupation: [employer]   Tobacco Use    Smoking status: Former Smoker     Packs/day: 0.25     Years: 4.00     Pack years: 1.00     Start date: 1980     Quit date: 2001     Years since quittin.3    Smokeless tobacco: Never Used    Tobacco comment: used to bum cigarettes off people with social drinking events, quit several years ago   Substance and Sexual Activity    Alcohol use: Yes     Comment: social    Drug use: No    Sexual activity: Yes     Partners: Male   Other Topics Concern    Not on file   Social History Narrative    Not on file     Social Determinants of Health     Financial Resource Strain: Low Risk     Difficulty of Paying Living Expenses: Not hard at all   Food Insecurity: No Food Insecurity    Worried About Running Out of Food in the Last Year: Never true    Peggy of Food in the Last Year: Never true   Transportation Needs: No Transportation Needs    Lack of Transportation (Medical): No    Lack of Transportation (Non-Medical):  No   Physical Activity:     Days of Exercise per Week: Not on file    Minutes of Exercise per Session: Not on file   Stress:     Feeling of Stress : Not on file   Social Connections:     Frequency of Communication with Friends and Family: Not on file    Frequency of Social Gatherings with Friends and Family: Not on file    Attends Worship Services: Not on file    Active Member of [address] [place] [address] [employer] or Organizations: Not on file    Attends Club or Organization Meetings: Not on file    Marital Status: Not on file   Intimate Partner Violence:     Fear of Current or Ex-Partner: Not on file    Emotionally Abused: Not on file    Physically Abused: Not on file    Sexually Abused: Not on file   Housing Stability:     Unable to Pay for Housing in the Last Year: Not on file    Number of [place] in the Last Year: Not on file    Unstable Housing in the Last Year: Not on file         Current Outpatient Medications:     rOPINIRole (REQUIP) 0.25 MG tablet, TAKE 1 TABLET BY MOUTH NIGHTLY, Disp: 90 tablet, Rfl: 1    lisinopril (PRINIVIL;ZESTRIL) 10 MG tablet, Take 10 mg by mouth daily, Disp: , Rfl:     minocycline (MINOCIN;DYNACIN) 100 MG capsule, , Disp: , Rfl:     Semaglutide, 1 MG/DOSE, (OZEMPIC, 1 MG/DOSE,) 4 MG/3ML SOPN, INJECT 1 MG INTO THE SKIN ONCE WEEKLY, Disp: 9 mL, Rfl: 1    citalopram (CELEXA) 20 MG tablet, TAKE 1 TABLET BY MOUTH EVERY DAY, Disp: 90 tablet, Rfl: 1    vitamin D (ERGOCALCIFEROL) 1.25 MG (95944 UT) CAPS capsule, TAKE 1 CAPSULE BY MOUTH ONE TIME PER WEEK, Disp: 12 capsule, Rfl: 1    blood glucose monitor strips, Test 3 times a day & as needed for symptoms of irregular blood glucose. Dispense sufficient amount for indicated testing frequency plus additional to accommodate PRN testing needs. , Disp: 100 strip, Rfl: 2    Allergies   Allergen Reactions    Amoxil [Amoxicillin Trihydrate] Rash       There were no vitals taken for this visit. Physical Exam          Assessment   Diagnosis Orders   1. Encounter for annual routine gynecological examination     2. Screening for cervical cancer     3. Screening for HPV (human papillomavirus)     4. Encounter for screening mammogram for breast cancer         Plan     1. Pap smear and HPV  2. Mammogram order for next year  3. RTC one year or prn  I Kandi Springer, am scribing for and in the presence of Dr. Urszula Perla. I, Dr. Urszula Perla, personally performed the services described in this documentation as scribed by Kandi Springer in my presence, and it is both accurate and complete.

## 2022-03-11 NOTE — PROGRESS NOTES
Jelena Qiu is a 61 y.o. who presents for colposcopy. /78   Ht 5' 7\" (1.702 m)   Wt 244 lb (110.7 kg)   BMI 38.22 kg/m²       Colposcopy Procedure Note    Indications: Pap smear 1 months ago showed: HPV. The prior pap showed no abnormalities. Prior cervical/vaginal disease: normal exam without visible pathology. Prior cervical treatment: no treatment. Procedure Details   The risks and benefits of the procedure and Written informed consent obtained. Speculum placed in vagina and excellent visualization of cervix achieved, cervix swabbed x 3 with acetic acid solution. Findings:  Cervix: acetowhite lesion(s) noted at 12 o'clock; endocervical curettage performed and cervical biopsies taken at 12 o'clock. Vaginal inspection: normal without visible lesions. Vulvar colposcopy: vulvar colposcopy not performed. Specimens: ECC, 12 oclock    Complications: none. Plan:  1. Specimens labelled and sent to Pathology. 2. Will base further treatment on Pathology findings. 3. Nothing in vagina x 48 hrs  4. Will notify of results. 5. Flagyl 500 mg bid sent to pharmacy for c/o recurring BV  I Phil Johnston, am scribing for and in the presence of Dr. Tracy Fernandez. I, Dr. Tracy Fernandez, personally performed the services described in this documentation as scribed by Phil Johnston in my presence, and it is both accurate and complete.

## 2022-03-16 ENCOUNTER — TELEPHONE (OUTPATIENT)
Dept: OBGYN CLINIC | Age: 57
End: 2022-03-16

## 2022-04-13 DIAGNOSIS — G47.33 OSA ON CPAP: ICD-10-CM

## 2022-04-13 DIAGNOSIS — E66.01 MORBID OBESITY (HCC): ICD-10-CM

## 2022-04-13 DIAGNOSIS — E55.9 VITAMIN D DEFICIENCY: ICD-10-CM

## 2022-04-13 DIAGNOSIS — Z99.89 OSA ON CPAP: ICD-10-CM

## 2022-04-13 DIAGNOSIS — I10 ESSENTIAL HYPERTENSION: ICD-10-CM

## 2022-04-13 DIAGNOSIS — E11.9 TYPE 2 DIABETES MELLITUS WITHOUT COMPLICATION, WITHOUT LONG-TERM CURRENT USE OF INSULIN (HCC): ICD-10-CM

## 2022-04-13 DIAGNOSIS — K76.0 NAFLD (NONALCOHOLIC FATTY LIVER DISEASE): ICD-10-CM

## 2022-04-13 DIAGNOSIS — E53.8 B12 DEFICIENCY: ICD-10-CM

## 2022-04-13 DIAGNOSIS — E66.09 EXOGENOUS OBESITY: ICD-10-CM

## 2022-04-13 LAB
ALBUMIN SERPL-MCNC: 4.4 G/DL (ref 3.5–5.2)
ALP BLD-CCNC: 64 U/L (ref 35–104)
ALT SERPL-CCNC: 38 U/L (ref 5–33)
ANION GAP SERPL CALCULATED.3IONS-SCNC: 17 MMOL/L (ref 7–19)
AST SERPL-CCNC: 36 U/L (ref 5–32)
BACTERIA: ABNORMAL /HPF
BASOPHILS ABSOLUTE: 0.1 K/UL (ref 0–0.2)
BASOPHILS RELATIVE PERCENT: 0.9 % (ref 0–1)
BILIRUB SERPL-MCNC: 0.6 MG/DL (ref 0.2–1.2)
BILIRUBIN URINE: ABNORMAL
BLOOD, URINE: NEGATIVE
BUN BLDV-MCNC: 9 MG/DL (ref 6–20)
CALCIUM SERPL-MCNC: 9.5 MG/DL (ref 8.6–10)
CHLORIDE BLD-SCNC: 99 MMOL/L (ref 98–111)
CHOLESTEROL, TOTAL: 157 MG/DL (ref 160–199)
CLARITY: ABNORMAL
CO2: 23 MMOL/L (ref 22–29)
COLOR: ABNORMAL
CREAT SERPL-MCNC: 0.7 MG/DL (ref 0.5–0.9)
CREATININE URINE: 414.1 MG/DL (ref 4.2–622)
EOSINOPHILS ABSOLUTE: 0.1 K/UL (ref 0–0.6)
EOSINOPHILS RELATIVE PERCENT: 1.5 % (ref 0–5)
EPITHELIAL CELLS, UA: ABNORMAL /HPF
GFR AFRICAN AMERICAN: >59
GFR NON-AFRICAN AMERICAN: >60
GLUCOSE BLD-MCNC: 159 MG/DL (ref 74–109)
GLUCOSE URINE: NEGATIVE MG/DL
HBA1C MFR BLD: 6.7 % (ref 4–6)
HCT VFR BLD CALC: 44 % (ref 37–47)
HDLC SERPL-MCNC: 52 MG/DL (ref 65–121)
HEMOGLOBIN: 14 G/DL (ref 12–16)
IMMATURE GRANULOCYTES #: 0 K/UL
KETONES, URINE: ABNORMAL MG/DL
LDL CHOLESTEROL CALCULATED: 76 MG/DL
LEUKOCYTE ESTERASE, URINE: ABNORMAL
LYMPHOCYTES ABSOLUTE: 1.9 K/UL (ref 1.1–4.5)
LYMPHOCYTES RELATIVE PERCENT: 29 % (ref 20–40)
MCH RBC QN AUTO: 26.7 PG (ref 27–31)
MCHC RBC AUTO-ENTMCNC: 31.8 G/DL (ref 33–37)
MCV RBC AUTO: 83.8 FL (ref 81–99)
MICROALBUMIN UR-MCNC: 1.7 MG/DL (ref 0–19)
MICROALBUMIN/CREAT UR-RTO: 4.1 MG/G
MONOCYTES ABSOLUTE: 0.4 K/UL (ref 0–0.9)
MONOCYTES RELATIVE PERCENT: 6 % (ref 0–10)
NEUTROPHILS ABSOLUTE: 4.2 K/UL (ref 1.5–7.5)
NEUTROPHILS RELATIVE PERCENT: 62.5 % (ref 50–65)
NITRITE, URINE: NEGATIVE
PDW BLD-RTO: 13.2 % (ref 11.5–14.5)
PH UA: 5.5 (ref 5–8)
PLATELET # BLD: 258 K/UL (ref 130–400)
PMV BLD AUTO: 10.1 FL (ref 9.4–12.3)
POTASSIUM SERPL-SCNC: 4.4 MMOL/L (ref 3.5–5)
PROTEIN UA: ABNORMAL MG/DL
RBC # BLD: 5.25 M/UL (ref 4.2–5.4)
RBC UA: ABNORMAL /HPF (ref 0–2)
SODIUM BLD-SCNC: 139 MMOL/L (ref 136–145)
SPECIFIC GRAVITY UA: 1.03 (ref 1–1.03)
TOTAL PROTEIN: 7.4 G/DL (ref 6.6–8.7)
TRIGL SERPL-MCNC: 144 MG/DL (ref 0–149)
TSH SERPL DL<=0.05 MIU/L-ACNC: 3.16 UIU/ML (ref 0.27–4.2)
UROBILINOGEN, URINE: 1 E.U./DL
VITAMIN D 25-HYDROXY: 40.1 NG/ML
WBC # BLD: 6.7 K/UL (ref 4.8–10.8)
WBC UA: ABNORMAL /HPF (ref 0–5)

## 2022-04-14 ENCOUNTER — OFFICE VISIT (OUTPATIENT)
Dept: INTERNAL MEDICINE | Age: 57
End: 2022-04-14
Payer: COMMERCIAL

## 2022-04-14 VITALS
WEIGHT: 250 LBS | RESPIRATION RATE: 18 BRPM | DIASTOLIC BLOOD PRESSURE: 76 MMHG | SYSTOLIC BLOOD PRESSURE: 118 MMHG | HEIGHT: 67 IN | BODY MASS INDEX: 39.24 KG/M2 | HEART RATE: 78 BPM | OXYGEN SATURATION: 96 %

## 2022-04-14 DIAGNOSIS — E66.09 EXOGENOUS OBESITY: ICD-10-CM

## 2022-04-14 DIAGNOSIS — Z00.00 ANNUAL PHYSICAL EXAM: Primary | ICD-10-CM

## 2022-04-14 DIAGNOSIS — E11.9 TYPE 2 DIABETES MELLITUS WITHOUT COMPLICATION, WITHOUT LONG-TERM CURRENT USE OF INSULIN (HCC): ICD-10-CM

## 2022-04-14 DIAGNOSIS — K76.0 NAFLD (NONALCOHOLIC FATTY LIVER DISEASE): ICD-10-CM

## 2022-04-14 DIAGNOSIS — E55.9 VITAMIN D DEFICIENCY: ICD-10-CM

## 2022-04-14 DIAGNOSIS — Z78.0 MENOPAUSE: ICD-10-CM

## 2022-04-14 PROCEDURE — 99396 PREV VISIT EST AGE 40-64: CPT | Performed by: INTERNAL MEDICINE

## 2022-04-14 RX ORDER — ERGOCALCIFEROL 1.25 MG/1
CAPSULE ORAL
Qty: 12 CAPSULE | Refills: 1 | Status: SHIPPED | OUTPATIENT
Start: 2022-04-14

## 2022-04-14 RX ORDER — SEMAGLUTIDE 1.34 MG/ML
INJECTION, SOLUTION SUBCUTANEOUS
Qty: 9 ML | Refills: 1 | Status: SHIPPED | OUTPATIENT
Start: 2022-04-14 | End: 2022-07-07 | Stop reason: SDUPTHER

## 2022-04-14 ASSESSMENT — PATIENT HEALTH QUESTIONNAIRE - PHQ9
7. TROUBLE CONCENTRATING ON THINGS, SUCH AS READING THE NEWSPAPER OR WATCHING TELEVISION: 0
4. FEELING TIRED OR HAVING LITTLE ENERGY: 0
2. FEELING DOWN, DEPRESSED OR HOPELESS: 0
SUM OF ALL RESPONSES TO PHQ9 QUESTIONS 1 & 2: 0
3. TROUBLE FALLING OR STAYING ASLEEP: 0
8. MOVING OR SPEAKING SO SLOWLY THAT OTHER PEOPLE COULD HAVE NOTICED. OR THE OPPOSITE, BEING SO FIGETY OR RESTLESS THAT YOU HAVE BEEN MOVING AROUND A LOT MORE THAN USUAL: 0
SUM OF ALL RESPONSES TO PHQ QUESTIONS 1-9: 0
1. LITTLE INTEREST OR PLEASURE IN DOING THINGS: 0
SUM OF ALL RESPONSES TO PHQ QUESTIONS 1-9: 0
5. POOR APPETITE OR OVEREATING: 0
10. IF YOU CHECKED OFF ANY PROBLEMS, HOW DIFFICULT HAVE THESE PROBLEMS MADE IT FOR YOU TO DO YOUR WORK, TAKE CARE OF THINGS AT HOME, OR GET ALONG WITH OTHER PEOPLE: 0
6. FEELING BAD ABOUT YOURSELF - OR THAT YOU ARE A FAILURE OR HAVE LET YOURSELF OR YOUR FAMILY DOWN: 0
9. THOUGHTS THAT YOU WOULD BE BETTER OFF DEAD, OR OF HURTING YOURSELF: 0

## 2022-04-14 ASSESSMENT — ENCOUNTER SYMPTOMS
DIARRHEA: 0
BLOOD IN STOOL: 0
ABDOMINAL PAIN: 0
SORE THROAT: 0
EYE REDNESS: 0
VOMITING: 0
SINUS PRESSURE: 0
VOICE CHANGE: 0
COLOR CHANGE: 0
TROUBLE SWALLOWING: 0
CHEST TIGHTNESS: 0
COUGH: 0
CONSTIPATION: 0
EYE PAIN: 0
WHEEZING: 0
NAUSEA: 0

## 2022-04-14 NOTE — PROGRESS NOTES
Chief Complaint:   Nayeli Rice is a 61 y.o. female who presents forcomplete physical exam.    History of Present Illness: Nayeli Rice is a 61 y.o. female who presents todayfor wellness visit AND follow up on her chronic medical conditions as noted below.     Patient Active Problem List    Diagnosis Date Noted    Vitamin D deficiency 01/15/2016    NAFLD (nonalcoholic fatty liver disease) 2012    Family history of esophageal cancer 2012    Mitral valve prolapse        Past Medical History:   Diagnosis Date    ASCUS of cervix with negative high risk HPV 2017    BV (bacterial vaginosis)     HPV (human papilloma virus) infection     Hypertension     Mitral valve prolapse     Morbid obesity ([address]     Seasonal depression ([facility] [address]        Past Surgical History:   Procedure Laterality Date     SECTION      fetal decels/distress, baby was fine    CHOLECYSTECTOMY      lap patsy by Dr. Sylvia Wetzel  2012    Daysi Dangelo N/A 2021    Dr Aneta Jeffries, Sm perianal tags wo bleeding, sm nonbleeding int hemorrhoids, Mild diverticulosis, 5 year recall    COLPOSCOPY  2018    DILATION AND CURETTAGE OF UTERUS N/A 2020    EXAM UNDER ANESTHESIA, HYSTEROSCOPY, MYOSURE, DILATION AND CURETTAGE performed by Tamiko Miller MD at [facility]  2012    Dr. Eugenie Horne, negative JOANNA testing       Current Outpatient Medications   Medication Sig Dispense Refill    Semaglutide, 1 MG/DOSE, (OZEMPIC, 1 MG/DOSE,) 4 MG/3ML SOPN INJECT 1 MG INTO THE SKIN ONCE WEEKLY 9 mL 1    vitamin D (ERGOCALCIFEROL) 1.25 MG (92834 UT) CAPS capsule TAKE 1 CAPSULE BY MOUTH ONE TIME PER WEEK 12 capsule 1    lisinopril (PRINIVIL;ZESTRIL) 10 MG tablet Take 10 mg by mouth daily      citalopram (CELEXA) 20 MG tablet TAKE 1 TABLET BY MOUTH EVERY DAY 90 tablet 1    blood glucose monitor strips Test 3 times a day & as needed for symptoms of irregular blood glucose. Dispense sufficient amount for indicated testing frequency plus additional to accommodate PRN testing needs. 100 strip 2     No current facility-administered medications for this visit. Allergies   Allergen Reactions    Amoxil [Amoxicillin Trihydrate] Rash       Social History     Socioeconomic History    Marital status:      Spouse name: None    Number of children: 2    Years of education: None    Highest education level: None   Occupational History    Occupation: [employer]   Tobacco Use    Smoking status: Former Smoker     Packs/day: 0.25     Years: 4.00     Pack years: 1.00     Start date: 1980     Quit date: 2001     Years since quittin.4    Smokeless tobacco: Never Used    Tobacco comment: used to bum cigarettes off people with social drinking events, quit several years ago   Substance and Sexual Activity    Alcohol use: Yes     Comment: social    Drug use: No    Sexual activity: Yes     Partners: Male   Other Topics Concern    None   Social History Narrative    None     Social Determinants of Health     Financial Resource Strain: Low Risk     Difficulty of Paying Living Expenses: Not hard at all   Food Insecurity: No Food Insecurity    Worried About Running Out of Food in the Last Year: Never true    Peggy of Food in the Last Year: Never true   Transportation Needs: No Transportation Needs    Lack of Transportation (Medical): No    Lack of Transportation (Non-Medical):  No   Physical Activity:     Days of Exercise per Week: Not on file    Minutes of Exercise per Session: Not on file   Stress:     Feeling of Stress : Not on file   Social Connections:     Frequency of Communication with Friends and Family: Not on file    Frequency of Social Gatherings with Friends and Family: Not on file    Attends Orthodox Services: Not on file    Active Member of Clubs or Organizations: Not on file    Attends Club or Organization Meetings: Not on file   [place] Marital Status: Not on file   Intimate Partner Violence:     Fear of Current or Ex-Partner: Not on file    Emotionally Abused: Not on file    Physically Abused: Not on file    Sexually Abused: Not on file   Housing Stability:     Unable to Pay for Housing in the Last Year: Not on file    Number of [place] in the Last Year: Not on file    Unstable Housing in the Last Year: Not on file     Family History   Problem Relation Age of Onset    Hypertension Mother     Cancer Maternal Grandfather         questionable esophageal cancer          Past Surgical History:   Procedure Laterality Date     SECTION      fetal decels/distress, baby was fine    CHOLECYSTECTOMY      lap patsy by Dr. Bogdan Peña  2012    Malina Dolton 2021    Dr Effie Jauregui, Sm perianal tags wo bleeding, sm nonbleeding int hemorrhoids, Mild diverticulosis, 5 year recall    COLPOSCOPY  2018    DILATION AND CURETTAGE OF UTERUS N/A 2020    EXAM UNDER ANESTHESIA, HYSTEROSCOPY, MYOSURE, DILATION AND CURETTAGE performed by Cici Barnhart MD at [place] [address]  2012    Dr. Quyen Odom, negative JOANNA testing         Lab Review   Orders Only on 2022   Component Date Value    Vit D, 25-Hydroxy 2022 40.1     TSH 2022 3.160     Color, UA 2022 DARK YELLOW*    Clarity, UA 2022 CLOUDY*    Glucose, Ur 2022 Negative     Bilirubin Urine 2022 SMALL*    Ketones, Urine 2022 TRACE*    Specific [place], UA 2022 1.028     Blood, Urine 2022 Negative     pH, UA 2022 5.5     Protein, UA 2022 TRACE*    Urobilinogen, Urine 2022 1.0     Nitrite, Urine 2022 Negative     Leukocyte Esterase, Urine 2022 MODERATE*    Microalbumin, Random Uri* 2022 1.70     Creatinine, Ur 2022 414.1     Microalbumin Creatinine * 2022 4.1     Cholesterol, Total 2022 157*    Triglycerides 04/13/2022 144     HDL 04/13/2022 52*    LDL Calculated 04/13/2022 76     WBC 04/13/2022 6.7     RBC 04/13/2022 5.25     Hemoglobin 04/13/2022 14.0     Hematocrit 04/13/2022 44.0     MCV 04/13/2022 83.8     MCH 04/13/2022 26.7*    MCHC 04/13/2022 31.8*    RDW 04/13/2022 13.2     Platelets 09/29/7656 258     MPV 04/13/2022 10.1     Neutrophils % 04/13/2022 62.5     Lymphocytes % 04/13/2022 29.0     Monocytes % 04/13/2022 6.0     Eosinophils % 04/13/2022 1.5     Basophils % 04/13/2022 0.9     Neutrophils Absolute 04/13/2022 4.2     Immature Granulocytes # 04/13/2022 0.0     Lymphocytes Absolute 04/13/2022 1.9     Monocytes Absolute 04/13/2022 0.40     Eosinophils Absolute 04/13/2022 0.10     Basophils Absolute 04/13/2022 0.10     Sodium 04/13/2022 139     Potassium 04/13/2022 4.4     Chloride 04/13/2022 99     CO2 04/13/2022 23     Anion Gap 04/13/2022 17     Glucose 04/13/2022 159*    BUN 04/13/2022 9     CREATININE 04/13/2022 0.7     GFR Non- 04/13/2022 >60     GFR  04/13/2022 >59     Calcium 04/13/2022 9.5     Total Protein 04/13/2022 7.4     Albumin 04/13/2022 4.4     Total Bilirubin 04/13/2022 0.6     Alkaline Phosphatase 04/13/2022 64     ALT 04/13/2022 38*    AST 04/13/2022 36*    Hemoglobin A1C 04/13/2022 6.7*    WBC, UA 04/13/2022 3-5*    RBC, UA 04/13/2022 0-1     Epithelial Cells, UA 04/13/2022 5-10     Bacteria, UA 04/13/2022 1+*   Office Visit on 03/11/2022   Component Date Value    Preg Test, Ur 03/11/2022 -     Control 03/11/2022 +          Review of Systems   Constitutional: Positive for fatigue. Negative for chills and fever. HENT: Negative for congestion, ear pain, postnasal drip, sinus pressure, sore throat, trouble swallowing and voice change. Eyes: Negative for pain, redness and visual disturbance. Respiratory: Negative for cough, chest tightness and wheezing.     Cardiovascular: Negative for chest pain, palpitations and leg swelling. Gastrointestinal: Negative for abdominal pain, blood in stool, constipation, diarrhea, nausea and vomiting. Endocrine: Negative for polydipsia and polyuria. Genitourinary: Negative for dysuria, enuresis, flank pain, frequency and urgency. Musculoskeletal: Negative for arthralgias, gait problem and joint swelling. Skin: Negative for color change and rash. Neurological: Negative for dizziness, tremors, syncope, facial asymmetry, speech difficulty, weakness, numbness and headaches. Psychiatric/Behavioral: Negative for agitation, behavioral problems, confusion, sleep disturbance and suicidal ideas. The patient is not nervous/anxious. Vitals:    04/14/22 1521   BP: 118/76   Site: Left Upper Arm   Position: Sitting   Cuff Size: Large Adult   Pulse: 78   Resp: 18   SpO2: 96%   Weight: 250 lb (113.4 kg)   Height: 5' 7\" (1.702 m)      Wt Readings from Last 3 Encounters:   04/14/22 250 lb (113.4 kg)   03/11/22 244 lb (110.7 kg)   01/24/22 244 lb (110.7 kg)   Body mass index is 39.16 kg/m². BP Readings from Last 3 Encounters:   04/14/22 118/76   03/11/22 128/78   01/24/22 132/80       Physical Exam  Constitutional:       Appearance: She is well-developed. She is obese. HENT:      Head: Normocephalic and atraumatic. Right Ear: External ear normal.      Left Ear: External ear normal.   Eyes:      General: No scleral icterus. Conjunctiva/sclera: Conjunctivae normal.      Pupils: Pupils are equal, round, and reactive to light. Neck:      Thyroid: No thyromegaly. Vascular: No JVD. Cardiovascular:      Rate and Rhythm: Normal rate and regular rhythm. Heart sounds: Normal heart sounds. No murmur heard. Pulmonary:      Effort: Pulmonary effort is normal. No respiratory distress. Breath sounds: Normal breath sounds. No wheezing. Chest:      Chest wall: No tenderness.    Abdominal:      General: Bowel sounds are normal.      Palpations: Abdomen is soft. There is no mass. Tenderness: There is no abdominal tenderness. Musculoskeletal:         General: No tenderness. Cervical back: Normal range of motion and neck supple. Lymphadenopathy:      Cervical: No cervical adenopathy. Skin:     General: Skin is warm and dry. Findings: No erythema or rash. Neurological:      Mental Status: She is alert and oriented to person, place, and time. Cranial Nerves: No cranial nerve deficit. Coordination: Coordination normal.      Deep Tendon Reflexes: Reflexes are normal and symmetric.    Psychiatric:         Behavior: Behavior normal.           ASSESSMENT/PLAN    ANNUAL PHysical  Cscope 6/21- repeat 5 yrs  Mammogram per Dr Abel Gray  Pap per dr Pedro Pratt  BD-never had one- schedule      Type 2 diabetes mellitus   Some weight gain  Hemoglobin A1c 4/2022 elevated at 6.7  Prior data  12/2021 hemoglobin A1c  6.5  Was significantly elevated at 8.9 in 9/2021  Hemoglobin A1c 4/28/2021 is 6.8  Restarted ozempic 9/2021  Weight was improving but since January 2022 appointment has gained weight  Recommendations  Restart diet avoiding added sugar and decreasing simple carbohydrates  Increase physical activity  Rx Ozempic 1 mg weekly    Anxiety/ tress  celexa has been helpful  decreae        Essential hypertension  Blood pressure readings reviewed  Blood pressure well controlled  Rx lisinopril 10/12.5 p.o. daily     B12 level is 414  Suggest b complex vitamin daily     Vitamin D level is 36  Was very low at 11  Take vit d 50,000 iu weekly     NAFLD  Morbid obesity  4/2022 alt 38 and AST 36   12/2021 ALT is 41 and AST is 34 ( 53/51)  Healthy, mostly fiber rich nonstarchy plant-based diet recommended  Recommend to decrease intake of processed foods, simple carbohydrates and animal-based products that high in saturated fats     Pt was given counseling about diet and exercise including education on what calories are, where calories come from, the need for portion control,following lower carbohydrate dietary regimen and healthy snacks along side an active lifestyle with supplementary exercise of approx 30 minutes a week, 5 days a week of exercise for weight loss.  The patient voiced increased understanding of the topics discussed.          AUDREY on CPAP  Describes restless sleep when she turns around frequently  Feels at times has trouble of restless legs as well  We will do trial with Requip   Orders Placed This Encounter   Procedures    DEXA BONE DENSITY 2 SITES    Hemoglobin A1C    Comprehensive Metabolic Panel    Vitamin D 25 Hydroxy     New Prescriptions    No medications on file      There are no Patient Instructions on file for this visit. Return in about 4 months (around 8/14/2022) for Medication check. EMR Dragon/transcription disclaimer:Significant part of this  encounter note is electronic transcription/translation of spoken language to printed text. The electronic translation of spoken language may beerroneous, or at times, nonsensical words or phrases may be inadvertently transcribed.  Although I have reviewed the note for such errors, some may still exist.

## 2022-05-09 RX ORDER — LISINOPRIL AND HYDROCHLOROTHIAZIDE 12.5; 1 MG/1; MG/1
TABLET ORAL
Qty: 90 TABLET | Refills: 1 | OUTPATIENT
Start: 2022-05-09

## 2022-07-07 ENCOUNTER — PATIENT MESSAGE (OUTPATIENT)
Dept: INTERNAL MEDICINE | Age: 57
End: 2022-07-07

## 2022-07-07 RX ORDER — LISINOPRIL 10 MG/1
10 TABLET ORAL DAILY
Qty: 90 TABLET | Refills: 1 | Status: SHIPPED | OUTPATIENT
Start: 2022-07-07 | End: 2022-10-05 | Stop reason: SDUPTHER

## 2022-07-07 RX ORDER — LISINOPRIL AND HYDROCHLOROTHIAZIDE 12.5; 1 MG/1; MG/1
TABLET ORAL
Qty: 90 TABLET | Refills: 1 | OUTPATIENT
Start: 2022-07-07

## 2022-07-07 RX ORDER — SEMAGLUTIDE 1.34 MG/ML
INJECTION, SOLUTION SUBCUTANEOUS
Qty: 9 ML | Refills: 1 | Status: SHIPPED | OUTPATIENT
Start: 2022-07-07 | End: 2022-08-16

## 2022-07-07 NOTE — TELEPHONE ENCOUNTER
From: Yuly Cardoso  To: Dr. Chloe Meade  Sent: 7/7/2022 11:46 AM CDT  Subject: Need refills authorized    for Ozempic and Lasinapril    Thank Radhames Linares

## 2022-07-07 NOTE — TELEPHONE ENCOUNTER
Leonarda Hinds called to request a refill on her medication.       Last office visit : 4/14/2022   Next office visit : 8/16/2022     Requested Prescriptions     Pending Prescriptions Disp Refills    Semaglutide, 1 MG/DOSE, (OZEMPIC, 1 MG/DOSE,) 4 MG/3ML SOPN 9 mL 1     Sig: INJECT 1 MG INTO THE SKIN ONCE WEEKLY    lisinopril (PRINIVIL;ZESTRIL) 10 MG tablet 90 tablet 1     Sig: Take 1 tablet by mouth daily            Nayla Terrell MA

## 2022-07-26 ENCOUNTER — TELEPHONE (OUTPATIENT)
Dept: INTERNAL MEDICINE | Age: 57
End: 2022-07-26

## 2022-07-26 NOTE — TELEPHONE ENCOUNTER
S/w [facility], they called to follow-up on an authorization for pt's Ozempic. The one she had  in April. [facility] says we submitted one as buy and bill; she says it needs to be submitted through the pharmacy. If we do buy and bill, it needs to submitted through medical side. Pt is out of her medication and this needs to be marked as urgent.

## 2022-08-15 DIAGNOSIS — E11.9 TYPE 2 DIABETES MELLITUS WITHOUT COMPLICATION, WITHOUT LONG-TERM CURRENT USE OF INSULIN (HCC): ICD-10-CM

## 2022-08-15 DIAGNOSIS — Z78.0 MENOPAUSE: ICD-10-CM

## 2022-08-15 DIAGNOSIS — E66.09 EXOGENOUS OBESITY: ICD-10-CM

## 2022-08-15 DIAGNOSIS — K76.0 NAFLD (NONALCOHOLIC FATTY LIVER DISEASE): ICD-10-CM

## 2022-08-15 DIAGNOSIS — E55.9 VITAMIN D DEFICIENCY: ICD-10-CM

## 2022-08-15 LAB
ALBUMIN SERPL-MCNC: 4.3 G/DL (ref 3.5–5.2)
ALP BLD-CCNC: 65 U/L (ref 35–104)
ALT SERPL-CCNC: 24 U/L (ref 5–33)
ANION GAP SERPL CALCULATED.3IONS-SCNC: 11 MMOL/L (ref 7–19)
AST SERPL-CCNC: 21 U/L (ref 5–32)
BILIRUB SERPL-MCNC: 0.4 MG/DL (ref 0.2–1.2)
BUN BLDV-MCNC: 15 MG/DL (ref 6–20)
CALCIUM SERPL-MCNC: 9.4 MG/DL (ref 8.6–10)
CHLORIDE BLD-SCNC: 103 MMOL/L (ref 98–111)
CO2: 25 MMOL/L (ref 22–29)
CREAT SERPL-MCNC: 0.8 MG/DL (ref 0.5–0.9)
GFR AFRICAN AMERICAN: >59
GFR NON-AFRICAN AMERICAN: >60
GLUCOSE BLD-MCNC: 139 MG/DL (ref 74–109)
HBA1C MFR BLD: 6.3 % (ref 4–6)
POTASSIUM SERPL-SCNC: 4.2 MMOL/L (ref 3.5–5)
SODIUM BLD-SCNC: 139 MMOL/L (ref 136–145)
TOTAL PROTEIN: 7.9 G/DL (ref 6.6–8.7)
VITAMIN D 25-HYDROXY: 32.1 NG/ML

## 2022-08-16 ENCOUNTER — OFFICE VISIT (OUTPATIENT)
Dept: INTERNAL MEDICINE | Age: 57
End: 2022-08-16
Payer: COMMERCIAL

## 2022-08-16 VITALS
HEIGHT: 67 IN | WEIGHT: 241 LBS | SYSTOLIC BLOOD PRESSURE: 122 MMHG | RESPIRATION RATE: 18 BRPM | OXYGEN SATURATION: 97 % | BODY MASS INDEX: 37.83 KG/M2 | HEART RATE: 74 BPM | DIASTOLIC BLOOD PRESSURE: 78 MMHG

## 2022-08-16 DIAGNOSIS — E11.9 TYPE 2 DIABETES MELLITUS WITHOUT COMPLICATION, WITHOUT LONG-TERM CURRENT USE OF INSULIN (HCC): Primary | ICD-10-CM

## 2022-08-16 DIAGNOSIS — Z99.89 OSA ON CPAP: ICD-10-CM

## 2022-08-16 DIAGNOSIS — I10 ESSENTIAL HYPERTENSION: ICD-10-CM

## 2022-08-16 DIAGNOSIS — E55.9 VITAMIN D DEFICIENCY: ICD-10-CM

## 2022-08-16 DIAGNOSIS — E53.8 B12 DEFICIENCY: ICD-10-CM

## 2022-08-16 DIAGNOSIS — K76.0 NAFLD (NONALCOHOLIC FATTY LIVER DISEASE): ICD-10-CM

## 2022-08-16 DIAGNOSIS — G47.33 OSA ON CPAP: ICD-10-CM

## 2022-08-16 DIAGNOSIS — E66.09 EXOGENOUS OBESITY: ICD-10-CM

## 2022-08-16 PROCEDURE — 3017F COLORECTAL CA SCREEN DOC REV: CPT | Performed by: INTERNAL MEDICINE

## 2022-08-16 PROCEDURE — G8427 DOCREV CUR MEDS BY ELIG CLIN: HCPCS | Performed by: INTERNAL MEDICINE

## 2022-08-16 PROCEDURE — 1036F TOBACCO NON-USER: CPT | Performed by: INTERNAL MEDICINE

## 2022-08-16 PROCEDURE — G8417 CALC BMI ABV UP PARAM F/U: HCPCS | Performed by: INTERNAL MEDICINE

## 2022-08-16 PROCEDURE — 99214 OFFICE O/P EST MOD 30 MIN: CPT | Performed by: INTERNAL MEDICINE

## 2022-08-16 PROCEDURE — 3044F HG A1C LEVEL LT 7.0%: CPT | Performed by: INTERNAL MEDICINE

## 2022-08-16 PROCEDURE — 2022F DILAT RTA XM EVC RTNOPTHY: CPT | Performed by: INTERNAL MEDICINE

## 2022-08-16 ASSESSMENT — ENCOUNTER SYMPTOMS
COUGH: 0
CHEST TIGHTNESS: 0
WHEEZING: 0
SORE THROAT: 0
ABDOMINAL PAIN: 0
CONSTIPATION: 0

## 2022-08-16 NOTE — PROGRESS NOTES
Chief Complaint   Patient presents with    Follow-up     4 month       History of presenting illness: Lyndsay Jiménez is a63 y.o. female who presents today for follow up on her chronic medical conditions as noted below. Patient Active Problem List    Diagnosis Date Noted    Vitamin D deficiency 01/15/2016    NAFLD (nonalcoholic fatty liver disease) 2012    Family history of esophageal cancer 2012    Mitral valve prolapse      Past Medical History:   Diagnosis Date    ASCUS of cervix with negative high risk HPV 2017    BV (bacterial vaginosis)     HPV (human papilloma virus) infection     Hypertension     Mitral valve prolapse     Morbid obesity ([address]     Seasonal depression ([facility] [address]       Past Surgical History:   Procedure Laterality Date     SECTION      fetal decels/distress, baby was fine    CHOLECYSTECTOMY      lap patsy by Dr. Stefan Tavera  2012    Mary Pew N/A 2021    Dr Indiana Ydaav, Sm perianal tags wo bleeding, sm nonbleeding int hemorrhoids, Mild diverticulosis, 5 year recall    COLPOSCOPY  2018    DILATION AND CURETTAGE OF UTERUS N/A 2020    EXAM UNDER ANESTHESIA, HYSTEROSCOPY, MYOSURE, DILATION AND CURETTAGE performed by Christiano Wong MD at [address]  2012    Dr. Yelena Campos, negative JOANNA testing     Current Outpatient Medications   Medication Sig Dispense Refill    Semaglutide, 2 MG/DOSE, 8 MG/3ML SOPN Inject 2 mg into the skin once a week 3 mL 2    lisinopril (PRINIVIL;ZESTRIL) 10 MG tablet Take 1 tablet by mouth daily 90 tablet 1    vitamin D (ERGOCALCIFEROL) 1.25 MG (79543 UT) CAPS capsule TAKE 1 CAPSULE BY MOUTH ONE TIME PER WEEK 12 capsule 1    citalopram (CELEXA) 20 MG tablet TAKE 1 TABLET BY MOUTH EVERY DAY 90 tablet 1    blood glucose monitor strips Test 3 times a day & as needed for symptoms of irregular blood glucose.  Dispense sufficient amount for indicated testing frequency plus additional to accommodate PRN testing needs. 100 strip 2     No current facility-administered medications for this visit. Allergies   Allergen Reactions    Amoxil [Amoxicillin Trihydrate] Rash     Social History     Tobacco Use    Smoking status: Former     Packs/day: 0.25     Years: 4.00     Pack years: 1.00     Types: Cigarettes     Start date: 1980     Quit date: 2001     Years since quittin.8    Smokeless tobacco: Never    Tobacco comments:     used to bum cigarettes off people with social drinking events, quit several years ago   Substance Use Topics    Alcohol use: Yes     Comment: social      Family History   Problem Relation Age of Onset    Hypertension Mother     Cancer Maternal Grandfather         questionable esophageal cancer       Review of Systems   Constitutional:  Negative for chills, fatigue and fever. HENT:  Negative for congestion, ear pain, nosebleeds, postnasal drip and sore throat. Respiratory:  Negative for cough, chest tightness and wheezing. Cardiovascular:  Negative for chest pain, palpitations and leg swelling. Gastrointestinal:  Negative for abdominal pain and constipation. Genitourinary:  Negative for dysuria and urgency. Musculoskeletal: Negative. Negative for arthralgias. Skin:  Negative for rash. Neurological:  Negative for dizziness and headaches. Psychiatric/Behavioral: Negative. Vitals:    22 1545   BP: 122/78   Site: Left Upper Arm   Position: Sitting   Cuff Size: Large Adult   Pulse: 74   Resp: 18   SpO2: 97%   Weight: 241 lb (109.3 kg)   Height: 5' 7\" (1.702 m)     Body mass index is 37.75 kg/m². Physical Exam  Constitutional:       Appearance: She is well-developed. HENT:      Right Ear: External ear normal.      Left Ear: External ear normal.      Mouth/Throat:      Pharynx: No oropharyngeal exudate. Eyes:      Conjunctiva/sclera: Conjunctivae normal.      Pupils: Pupils are equal, round, and reactive to light.    Neck: Thyroid: No thyromegaly. Vascular: No JVD. Cardiovascular:      Rate and Rhythm: Normal rate. Heart sounds: Normal heart sounds. No murmur heard. Pulmonary:      Effort: No respiratory distress. Breath sounds: Normal breath sounds. No wheezing or rales. Chest:      Chest wall: No tenderness. Abdominal:      General: Bowel sounds are normal.      Palpations: Abdomen is soft. Musculoskeletal:      Cervical back: Neck supple. Lymphadenopathy:      Cervical: No cervical adenopathy. Skin:     Findings: No rash.        Lab Review   Orders Only on 08/15/2022   Component Date Value    Vit D, 25-Hydroxy 08/15/2022 32.1     Sodium 08/15/2022 139     Potassium 08/15/2022 4.2     Chloride 08/15/2022 103     CO2 08/15/2022 25     Anion Gap 08/15/2022 11     Glucose 08/15/2022 139 (A)    BUN 08/15/2022 15     Creatinine 08/15/2022 0.8     GFR Non- 08/15/2022 >60     GFR  08/15/2022 >59     Calcium 08/15/2022 9.4     Total Protein 08/15/2022 7.9     Albumin 08/15/2022 4.3     Total Bilirubin 08/15/2022 0.4     Alkaline Phosphatase 08/15/2022 65     ALT 08/15/2022 24     AST 08/15/2022 21     Hemoglobin A1C 08/15/2022 6.3 (A)           ASSESSMENT/PLAN:      Type 2 diabetes mellitus   Some weight gain  Hemoglobin A1c  now 6.3 in 8/2022  Was 4/2022 elevated at 6.7  Prior data  12/2021 hemoglobin A1c  6.5  Was significantly elevated at 8.9 in 9/2021  Hemoglobin A1c 4/28/2021 is 6.8  Restarted ozempic 9/2021  Weight was improving but since January 2022 appointment has gained weight  Recommendations  Restart diet avoiding added sugar and decreasing simple carbohydrates  Increase physical activity  Rx increase Ozempic to  2 mg  weekly     Anxiety/ tress  celexa has been helpful  decreae        Essential hypertension  Blood pressure readings reviewed  Blood pressure well controlled  Rx lisinopril 10/12.5 p.o. daily     B12 level is 414  Suggest b complex vitamin daily

## 2022-08-19 ENCOUNTER — PATIENT MESSAGE (OUTPATIENT)
Dept: INTERNAL MEDICINE | Age: 57
End: 2022-08-19

## 2022-08-22 NOTE — TELEPHONE ENCOUNTER
From: Fifi Lemos  To: Dr. Fabian Nett: 8/19/2022 10:05 AM CDT  Subject: [facility]    I was at [address] yesterday, they requested a new RX and my notes from my chart regarding sleep/machine faxed to them [phone]. they would like this sent to them yearly.     Thanks

## 2022-09-16 ENCOUNTER — TELEPHONE (OUTPATIENT)
Dept: INTERNAL MEDICINE | Age: 57
End: 2022-09-16

## 2022-09-16 NOTE — TELEPHONE ENCOUNTER
Patient stopped by for sample of Ozempic 2mg. There was a PA sent off back in July and it looks like we never heard the response from that. If you could look into it for patient, please. I provided her with a sample and the lot number on it was [id number] and the Exp: 06-.

## 2022-09-19 NOTE — TELEPHONE ENCOUNTER
I looked into this. The patient is not needing a PA, she was not able to fill the 2mg pen due to the pharmacy not having it.

## 2022-09-23 DIAGNOSIS — F41.8 DEPRESSION WITH ANXIETY: ICD-10-CM

## 2022-09-23 RX ORDER — CITALOPRAM 20 MG/1
TABLET ORAL
Qty: 90 TABLET | Refills: 1 | Status: SHIPPED | OUTPATIENT
Start: 2022-09-23

## 2022-10-05 RX ORDER — LISINOPRIL 10 MG/1
10 TABLET ORAL DAILY
Qty: 90 TABLET | Refills: 1 | Status: SHIPPED | OUTPATIENT
Start: 2022-10-05

## 2022-10-05 NOTE — TELEPHONE ENCOUNTER
Josephthanh Nelsoncoleen called to request a refill on her medication.       Last office visit : 8/16/2022   Next office visit : 12/19/2022     Requested Prescriptions     Pending Prescriptions Disp Refills    lisinopril (PRINIVIL;ZESTRIL) 10 MG tablet 90 tablet 1     Sig: Take 1 tablet by mouth daily            Sohan Bell MA

## 2022-10-25 ENCOUNTER — PATIENT MESSAGE (OUTPATIENT)
Dept: INTERNAL MEDICINE | Age: 57
End: 2022-10-25

## 2022-12-02 DIAGNOSIS — E11.9 TYPE 2 DIABETES MELLITUS WITHOUT COMPLICATION, WITHOUT LONG-TERM CURRENT USE OF INSULIN (HCC): ICD-10-CM

## 2022-12-02 DIAGNOSIS — E66.09 EXOGENOUS OBESITY: ICD-10-CM

## 2022-12-02 NOTE — TELEPHONE ENCOUNTER
Jaelyn Glen called to request a refill on her medication.       Last office visit : 8/16/2022   Next office visit : 12/19/2022     Requested Prescriptions     Pending Prescriptions Disp Refills    Semaglutide, 2 MG/DOSE, 8 MG/3ML SOPN 3 mL 2     Sig: Inject 2 mg into the skin once a week    Semaglutide, 1 MG/DOSE, 4 MG/3ML SOPN 3 mL 2     Sig: Inject 1 mg into the skin once a week            [place], [place]

## 2022-12-07 ENCOUNTER — PATIENT MESSAGE (OUTPATIENT)
Dept: INTERNAL MEDICINE | Age: 57
End: 2022-12-07

## 2022-12-08 NOTE — TELEPHONE ENCOUNTER
From: Madeline Campbell  To: Dr. Riki Bolden  Sent: 12/7/2022 4:26 PM CST  Subject: Relda Ali    you all called in my RX on 12/05; I just called [facility] both  the 2MG and 1MG doses are on backorder. do you have sample I can come by and get?

## 2023-01-05 DIAGNOSIS — E66.09 EXOGENOUS OBESITY: ICD-10-CM

## 2023-01-05 DIAGNOSIS — E11.9 TYPE 2 DIABETES MELLITUS WITHOUT COMPLICATION, WITHOUT LONG-TERM CURRENT USE OF INSULIN (HCC): ICD-10-CM

## 2023-01-05 DIAGNOSIS — K76.0 NAFLD (NONALCOHOLIC FATTY LIVER DISEASE): ICD-10-CM

## 2023-01-05 DIAGNOSIS — E53.8 B12 DEFICIENCY: ICD-10-CM

## 2023-01-05 DIAGNOSIS — E55.9 VITAMIN D DEFICIENCY: ICD-10-CM

## 2023-01-05 LAB
ALBUMIN SERPL-MCNC: 4.2 G/DL (ref 3.5–5.2)
ALP BLD-CCNC: 66 U/L (ref 35–104)
ALT SERPL-CCNC: 21 U/L (ref 5–33)
ANION GAP SERPL CALCULATED.3IONS-SCNC: 13 MMOL/L (ref 7–19)
AST SERPL-CCNC: 20 U/L (ref 5–32)
BILIRUB SERPL-MCNC: 0.4 MG/DL (ref 0.2–1.2)
BUN BLDV-MCNC: 12 MG/DL (ref 6–20)
CALCIUM SERPL-MCNC: 9.3 MG/DL (ref 8.6–10)
CHLORIDE BLD-SCNC: 103 MMOL/L (ref 98–111)
CHOLESTEROL, TOTAL: 135 MG/DL (ref 160–199)
CO2: 25 MMOL/L (ref 22–29)
CREAT SERPL-MCNC: 0.7 MG/DL (ref 0.5–0.9)
GFR SERPL CREATININE-BSD FRML MDRD: >60 ML/MIN/{1.73_M2}
GLUCOSE BLD-MCNC: 126 MG/DL (ref 74–109)
HBA1C MFR BLD: 5.9 % (ref 4–6)
HDLC SERPL-MCNC: 48 MG/DL (ref 65–121)
LDL CHOLESTEROL CALCULATED: 58 MG/DL
POTASSIUM SERPL-SCNC: 4.6 MMOL/L (ref 3.5–5)
SODIUM BLD-SCNC: 141 MMOL/L (ref 136–145)
TOTAL PROTEIN: 7.7 G/DL (ref 6.6–8.7)
TRIGL SERPL-MCNC: 146 MG/DL (ref 0–149)
VITAMIN B-12: 304 PG/ML (ref 211–946)
VITAMIN D 25-HYDROXY: 30.1 NG/ML

## 2023-01-06 ENCOUNTER — OFFICE VISIT (OUTPATIENT)
Dept: INTERNAL MEDICINE | Age: 58
End: 2023-01-06

## 2023-01-06 VITALS
HEART RATE: 78 BPM | DIASTOLIC BLOOD PRESSURE: 72 MMHG | OXYGEN SATURATION: 95 % | HEIGHT: 67 IN | WEIGHT: 241 LBS | RESPIRATION RATE: 18 BRPM | BODY MASS INDEX: 37.83 KG/M2 | SYSTOLIC BLOOD PRESSURE: 118 MMHG

## 2023-01-06 DIAGNOSIS — E55.9 VITAMIN D DEFICIENCY: ICD-10-CM

## 2023-01-06 DIAGNOSIS — I10 ESSENTIAL HYPERTENSION: ICD-10-CM

## 2023-01-06 DIAGNOSIS — E66.09 EXOGENOUS OBESITY: ICD-10-CM

## 2023-01-06 DIAGNOSIS — F41.8 DEPRESSION WITH ANXIETY: ICD-10-CM

## 2023-01-06 DIAGNOSIS — E53.8 B12 DEFICIENCY: ICD-10-CM

## 2023-01-06 DIAGNOSIS — E11.9 TYPE 2 DIABETES MELLITUS WITHOUT COMPLICATION, WITHOUT LONG-TERM CURRENT USE OF INSULIN (HCC): Primary | ICD-10-CM

## 2023-01-06 DIAGNOSIS — K76.0 NAFLD (NONALCOHOLIC FATTY LIVER DISEASE): ICD-10-CM

## 2023-01-06 DIAGNOSIS — Z99.89 OSA ON CPAP: ICD-10-CM

## 2023-01-06 DIAGNOSIS — G47.33 OSA ON CPAP: ICD-10-CM

## 2023-01-06 RX ORDER — ERGOCALCIFEROL 1.25 MG/1
CAPSULE ORAL
Qty: 12 CAPSULE | Refills: 1 | Status: SHIPPED | OUTPATIENT
Start: 2023-01-06

## 2023-01-06 SDOH — ECONOMIC STABILITY: FOOD INSECURITY: WITHIN THE PAST 12 MONTHS, YOU WORRIED THAT YOUR FOOD WOULD RUN OUT BEFORE YOU GOT MONEY TO BUY MORE.: NEVER TRUE

## 2023-01-06 SDOH — ECONOMIC STABILITY: FOOD INSECURITY: WITHIN THE PAST 12 MONTHS, THE FOOD YOU BOUGHT JUST DIDN'T LAST AND YOU DIDN'T HAVE MONEY TO GET MORE.: NEVER TRUE

## 2023-01-06 ASSESSMENT — ENCOUNTER SYMPTOMS
SORE THROAT: 0
ABDOMINAL PAIN: 0
WHEEZING: 0
CONSTIPATION: 0
COUGH: 0
CHEST TIGHTNESS: 0

## 2023-01-06 ASSESSMENT — SOCIAL DETERMINANTS OF HEALTH (SDOH): HOW HARD IS IT FOR YOU TO PAY FOR THE VERY BASICS LIKE FOOD, HOUSING, MEDICAL CARE, AND HEATING?: NOT HARD AT ALL

## 2023-01-06 ASSESSMENT — PATIENT HEALTH QUESTIONNAIRE - PHQ9
SUM OF ALL RESPONSES TO PHQ QUESTIONS 1-9: 0
SUM OF ALL RESPONSES TO PHQ QUESTIONS 1-9: 0
8. MOVING OR SPEAKING SO SLOWLY THAT OTHER PEOPLE COULD HAVE NOTICED. OR THE OPPOSITE, BEING SO FIGETY OR RESTLESS THAT YOU HAVE BEEN MOVING AROUND A LOT MORE THAN USUAL: 0
2. FEELING DOWN, DEPRESSED OR HOPELESS: 0
6. FEELING BAD ABOUT YOURSELF - OR THAT YOU ARE A FAILURE OR HAVE LET YOURSELF OR YOUR FAMILY DOWN: 0
SUM OF ALL RESPONSES TO PHQ QUESTIONS 1-9: 0
4. FEELING TIRED OR HAVING LITTLE ENERGY: 0
SUM OF ALL RESPONSES TO PHQ QUESTIONS 1-9: 0
5. POOR APPETITE OR OVEREATING: 0
SUM OF ALL RESPONSES TO PHQ9 QUESTIONS 1 & 2: 0
3. TROUBLE FALLING OR STAYING ASLEEP: 0
10. IF YOU CHECKED OFF ANY PROBLEMS, HOW DIFFICULT HAVE THESE PROBLEMS MADE IT FOR YOU TO DO YOUR WORK, TAKE CARE OF THINGS AT HOME, OR GET ALONG WITH OTHER PEOPLE: 0
9. THOUGHTS THAT YOU WOULD BE BETTER OFF DEAD, OR OF HURTING YOURSELF: 0
7. TROUBLE CONCENTRATING ON THINGS, SUCH AS READING THE NEWSPAPER OR WATCHING TELEVISION: 0
1. LITTLE INTEREST OR PLEASURE IN DOING THINGS: 0

## 2023-01-06 NOTE — PROGRESS NOTES
Chief Complaint   Patient presents with    Follow-up     4 month       History of presenting illness: Bev Marion is a60 y.o. female who presents today for follow up on her chronic medical conditions as noted below. Patient Active Problem List    Diagnosis Date Noted    Vitamin D deficiency 01/15/2016    NAFLD (nonalcoholic fatty liver disease) 2012    Family history of esophageal cancer 2012    Mitral valve prolapse      Past Medical History:   Diagnosis Date    ASCUS of cervix with negative high risk HPV 2017    BV (bacterial vaginosis)     HPV (human papilloma virus) infection     Hypertension     Mitral valve prolapse     Morbid obesity ([address]     Seasonal depression ([address]       Past Surgical History:   Procedure Laterality Date     SECTION      fetal decels/distress, baby was fine    CHOLECYSTECTOMY      lap patsy by Dr. Krystian Clark  2012    Ronne Lobe N/A 2021    Dr Theresa Miller, Sm perianal tags wo bleeding, sm nonbleeding int hemorrhoids, Mild diverticulosis, 5 year recall    COLPOSCOPY  2018    DILATION AND CURETTAGE OF UTERUS N/A 2020    EXAM UNDER ANESTHESIA, HYSTEROSCOPY, MYOSURE, DILATION AND CURETTAGE performed by Jerry Del Angel MD at [facility]  2012    Dr. Gino Zapata, negative JOANNA testing     Current Outpatient Medications   Medication Sig Dispense Refill    vitamin D (ERGOCALCIFEROL) 1.25 MG (37253 UT) CAPS capsule TAKE 1 CAPSULE BY MOUTH ONE TIME PER WEEK 12 capsule 1    Semaglutide, 2 MG/DOSE, 8 MG/3ML SOPN Inject 2 mg into the skin once a week 3 mL 2    lisinopril (PRINIVIL;ZESTRIL) 10 MG tablet Take 1 tablet by mouth daily 90 tablet 1    citalopram (CELEXA) 20 MG tablet TAKE 1 TABLET BY MOUTH EVERY DAY 90 tablet 1    blood glucose monitor strips Test 3 times a day & as needed for symptoms of irregular blood glucose.  Dispense sufficient amount for indicated testing frequency plus additional to accommodate PRN testing needs. 100 strip 2     No current facility-administered medications for this visit. Allergies   Allergen Reactions    Amoxil [Amoxicillin Trihydrate] Rash     Social History     Tobacco Use    Smoking status: Former     Packs/day: 0.25     Years: 4.00     Pack years: 1.00     Types: Cigarettes     Start date: 1980     Quit date: 2001     Years since quittin.1    Smokeless tobacco: Never    Tobacco comments:     used to bum cigarettes off people with social drinking events, quit several years ago   Substance Use Topics    Alcohol use: Yes     Comment: social      Family History   Problem Relation Age of Onset    Hypertension Mother     Cancer Maternal Grandfather         questionable esophageal cancer       Review of Systems   Constitutional:  Negative for chills, fatigue and fever. HENT:  Negative for congestion, ear pain, nosebleeds, postnasal drip and sore throat. Respiratory:  Negative for cough, chest tightness and wheezing. Cardiovascular:  Negative for chest pain, palpitations and leg swelling. Gastrointestinal:  Negative for abdominal pain and constipation. Genitourinary:  Negative for dysuria and urgency. Musculoskeletal: Negative. Negative for arthralgias. Skin:  Negative for rash. Neurological:  Negative for dizziness and headaches. Psychiatric/Behavioral: Negative. Vitals:    23 0834   BP: 118/72   Site: Left Upper Arm   Position: Sitting   Cuff Size: Large Adult   Pulse: 78   Resp: 18   SpO2: 95%   Weight: 241 lb (109.3 kg)   Height: 5' 7\" (1.702 m)     Body mass index is 37.75 kg/m². Physical Exam  Constitutional:       Appearance: She is well-developed. HENT:      Right Ear: External ear normal.      Left Ear: External ear normal.      Mouth/Throat:      Pharynx: No oropharyngeal exudate. Eyes:      Conjunctiva/sclera: Conjunctivae normal.      Pupils: Pupils are equal, round, and reactive to light.    Neck: Thyroid: No thyromegaly. Vascular: No JVD. Cardiovascular:      Rate and Rhythm: Normal rate. Heart sounds: Normal heart sounds. No murmur heard. Pulmonary:      Effort: No respiratory distress. Breath sounds: Normal breath sounds. No wheezing or rales. Chest:      Chest wall: No tenderness. Abdominal:      General: Bowel sounds are normal.      Palpations: Abdomen is soft. Musculoskeletal:      Cervical back: Neck supple. Lymphadenopathy:      Cervical: No cervical adenopathy. Skin:     Findings: No rash. Neurological:      Mental Status: She is oriented to person, place, and time.        Lab Review   Orders Only on 01/05/2023   Component Date Value    Vitamin B-12 01/05/2023 304     Vit D, 25-Hydroxy 01/05/2023 30.1     Cholesterol, Total 01/05/2023 135 (A)     Triglycerides 01/05/2023 146     HDL 01/05/2023 48 (A)     LDL Calculated 01/05/2023 58     Hemoglobin A1C 01/05/2023 5.9     Sodium 01/05/2023 141     Potassium 01/05/2023 4.6     Chloride 01/05/2023 103     CO2 01/05/2023 25     Anion Gap 01/05/2023 13     Glucose 01/05/2023 126 (A)     BUN 01/05/2023 12     Creatinine 01/05/2023 0.7     Est, Glom Filt Rate 01/05/2023 >60     Calcium 01/05/2023 9.3     Total Protein 01/05/2023 7.7     Albumin 01/05/2023 4.2     Total Bilirubin 01/05/2023 0.4     Alkaline Phosphatase 01/05/2023 66     ALT 01/05/2023 21     AST 01/05/2023 20            ASSESSMENT/PLAN:      Type 2 diabetes mellitus   Some weight gain  Hemoglobin A1c  now 5.9 in 12/2022  was6.3 in 8/2022  Was 4/2022 elevated at 6.7  Prior data  12/2021 hemoglobin A1c  6.5; 8.9 in 9/2021  Recommendations  *diet avoiding added sugar and decreasing simple carbohydrates  *Increase physical activity  Rx increase Ozempic to  2 mg  weekly     Anxiety/ tress  celexa has been helpful  decreae        Essential hypertension  Blood pressure readings reviewed  Blood pressure well controlled  Rx lisinopril 10/12.5 p.o. daily     B12 level is 304 (414)  Suggest  add b12 1 mg daily     Vitamin D level is 30   Was 32 in 8/2022  Take / restart vit d 50,000 iu weekly     NAFLD  Morbid obesity  1/2023 ast and alt nl  4/2022 alt 38 and AST 36   12/2021 ALT is 41 and AST is 34 ( 53/51)  Healthy, mostly fiber rich nonstarchy plant-based diet recommended  Recommend to decrease intake of processed foods, simple carbohydrates and animal-based products that high in saturated fats     Pt was given counseling about diet and exercise including education on what calories are, where calories come from, the need for portion control,following lower carbohydrate dietary regimen and healthy snacks along side an active lifestyle with supplementary exercise of approx 30 minutes a week, 5 days a week of exercise for weight loss. The patient voiced increased understanding of the topics discussed. AUDREY on CPAP  Doing well           Orders Placed This Encounter   Procedures    CBC with Auto Differential    Comprehensive Metabolic Panel    Hemoglobin A1C    Lipid Panel    Vitamin D 25 Hydroxy    Urinalysis    TSH    Microalbumin / Creatinine Urine Ratio    Vitamin B12     New Prescriptions    No medications on file         No follow-ups on file. There are no Patient Instructions on file for this visit. EMR Dragon/transcription disclaimer:Significant part of this  encounter note is electronic transcription/translationof spoken language to printed text. The electronic translation of spoken language may be erroneous, or at times, nonsensical words or phrases may be inadvertently transcribed.  Although I have reviewed the note for sucherrors, some may still exist.

## 2023-02-14 ENCOUNTER — PATIENT MESSAGE (OUTPATIENT)
Dept: INTERNAL MEDICINE | Age: 58
End: 2023-02-14

## 2023-02-14 NOTE — TELEPHONE ENCOUNTER
From: Alejandrina Gastelum  To: Dr. Annetta Johansen  Sent: 2/14/2023 1:54 PM CST  Subject: Sleep deprived    Good Afternoon Ladies !!    so I just returned from a trip and my sister told me that I jump every 30 seconds or so. she said everytime she woke up, my whole body jumps. I did have my mask on all night as well. What can I do? I can't tell you the last time I woke up REFRESHED.     Thanks, Tres Owens

## 2023-02-16 RX ORDER — PRAMIPEXOLE DIHYDROCHLORIDE 0.25 MG/1
0.25 TABLET ORAL NIGHTLY
Qty: 30 TABLET | Refills: 2 | Status: SHIPPED | OUTPATIENT
Start: 2023-02-16

## 2023-03-03 DIAGNOSIS — E11.9 TYPE 2 DIABETES MELLITUS WITHOUT COMPLICATION, WITHOUT LONG-TERM CURRENT USE OF INSULIN (HCC): ICD-10-CM

## 2023-03-03 DIAGNOSIS — E66.09 EXOGENOUS OBESITY: ICD-10-CM

## 2023-03-03 RX ORDER — SEMAGLUTIDE 2.68 MG/ML
INJECTION, SOLUTION SUBCUTANEOUS
Qty: 3 ML | Refills: 2 | Status: SHIPPED | OUTPATIENT
Start: 2023-03-03

## 2023-03-31 DIAGNOSIS — F41.8 DEPRESSION WITH ANXIETY: ICD-10-CM

## 2023-03-31 RX ORDER — CITALOPRAM 20 MG/1
TABLET ORAL
Qty: 90 TABLET | Refills: 1 | Status: SHIPPED | OUTPATIENT
Start: 2023-03-31

## 2023-03-31 NOTE — TELEPHONE ENCOUNTER
Constanza Brewer called requesting a refill of the below medication which has been pended for you:     Requested Prescriptions     Pending Prescriptions Disp Refills    citalopram (CELEXA) 20 MG tablet [Pharmacy Med Name: CITALOPRAM HBR 20 MG TABLET] 90 tablet 1     Sig: TAKE 1 TABLET BY MOUTH EVERY DAY       Last Appointment Date: 1/6/2023  Next Appointment Date: 5/11/2023    Allergies   Allergen Reactions    Amoxil [Amoxicillin Trihydrate] Rash

## 2023-05-09 DIAGNOSIS — F41.8 DEPRESSION WITH ANXIETY: ICD-10-CM

## 2023-05-09 DIAGNOSIS — E55.9 VITAMIN D DEFICIENCY: ICD-10-CM

## 2023-05-09 DIAGNOSIS — K76.0 NAFLD (NONALCOHOLIC FATTY LIVER DISEASE): ICD-10-CM

## 2023-05-09 DIAGNOSIS — E53.8 B12 DEFICIENCY: ICD-10-CM

## 2023-05-09 DIAGNOSIS — Z99.89 OSA ON CPAP: ICD-10-CM

## 2023-05-09 DIAGNOSIS — E11.9 TYPE 2 DIABETES MELLITUS WITHOUT COMPLICATION, WITHOUT LONG-TERM CURRENT USE OF INSULIN (HCC): ICD-10-CM

## 2023-05-09 DIAGNOSIS — E66.09 EXOGENOUS OBESITY: ICD-10-CM

## 2023-05-09 DIAGNOSIS — I10 ESSENTIAL HYPERTENSION: ICD-10-CM

## 2023-05-09 DIAGNOSIS — G47.33 OSA ON CPAP: ICD-10-CM

## 2023-05-09 LAB
25(OH)D3 SERPL-MCNC: 49.1 NG/ML
ALBUMIN SERPL-MCNC: 4.2 G/DL (ref 3.5–5.2)
ALP SERPL-CCNC: 59 U/L (ref 35–104)
ALT SERPL-CCNC: 20 U/L (ref 5–33)
ANION GAP SERPL CALCULATED.3IONS-SCNC: 12 MMOL/L (ref 7–19)
AST SERPL-CCNC: 20 U/L (ref 5–32)
BACTERIA #/AREA URNS HPF: ABNORMAL /HPF
BASOPHILS # BLD: 0.1 K/UL (ref 0–0.2)
BASOPHILS NFR BLD: 1.1 % (ref 0–1)
BILIRUB SERPL-MCNC: 0.5 MG/DL (ref 0.2–1.2)
BILIRUB UR QL STRIP: ABNORMAL
BUN SERPL-MCNC: 12 MG/DL (ref 6–20)
CALCIUM SERPL-MCNC: 9.4 MG/DL (ref 8.6–10)
CHLORIDE SERPL-SCNC: 103 MMOL/L (ref 98–111)
CHOLEST SERPL-MCNC: 143 MG/DL (ref 160–199)
CLARITY UR: ABNORMAL
CO2 SERPL-SCNC: 25 MMOL/L (ref 22–29)
COLOR UR: ABNORMAL
CREAT SERPL-MCNC: 0.7 MG/DL (ref 0.5–0.9)
CREAT UR-MCNC: 501.6 MG/DL (ref 4.2–622)
EOSINOPHIL # BLD: 0.1 K/UL (ref 0–0.6)
EOSINOPHIL NFR BLD: 1.5 % (ref 0–5)
ERYTHROCYTE [DISTWIDTH] IN BLOOD BY AUTOMATED COUNT: 13.5 % (ref 11.5–14.5)
GLUCOSE SERPL-MCNC: 115 MG/DL (ref 74–109)
GLUCOSE UR STRIP.AUTO-MCNC: NEGATIVE MG/DL
HBA1C MFR BLD: 5.7 % (ref 4–6)
HCT VFR BLD AUTO: 43.1 % (ref 37–47)
HDLC SERPL-MCNC: 55 MG/DL (ref 65–121)
HGB BLD-MCNC: 13.8 G/DL (ref 12–16)
HGB UR STRIP.AUTO-MCNC: NEGATIVE MG/L
IMM GRANULOCYTES # BLD: 0 K/UL
KETONES UR STRIP.AUTO-MCNC: ABNORMAL MG/DL
LDLC SERPL CALC-MCNC: 70 MG/DL
LEUKOCYTE ESTERASE UR QL STRIP.AUTO: ABNORMAL
LYMPHOCYTES # BLD: 2 K/UL (ref 1.1–4.5)
LYMPHOCYTES NFR BLD: 38 % (ref 20–40)
MCH RBC QN AUTO: 27.4 PG (ref 27–31)
MCHC RBC AUTO-ENTMCNC: 32 G/DL (ref 33–37)
MCV RBC AUTO: 85.5 FL (ref 81–99)
MICROALBUMIN UR-MCNC: 2.6 MG/DL (ref 0–19)
MICROALBUMIN/CREAT UR-RTO: 5.2 MG/G
MONOCYTES # BLD: 0.4 K/UL (ref 0–0.9)
MONOCYTES NFR BLD: 6.6 % (ref 0–10)
MUCOUS THREADS URNS QL MICRO: ABNORMAL /LPF
NEUTROPHILS # BLD: 2.8 K/UL (ref 1.5–7.5)
NEUTS SEG NFR BLD: 52.6 % (ref 50–65)
NITRITE UR QL STRIP.AUTO: NEGATIVE
PH UR STRIP.AUTO: 5.5 [PH] (ref 5–8)
PLATELET # BLD AUTO: 238 K/UL (ref 130–400)
PMV BLD AUTO: 10.3 FL (ref 9.4–12.3)
POTASSIUM SERPL-SCNC: 4.5 MMOL/L (ref 3.5–5)
PROT SERPL-MCNC: 7.7 G/DL (ref 6.6–8.7)
PROT UR STRIP.AUTO-MCNC: 30 MG/DL
RBC # BLD AUTO: 5.04 M/UL (ref 4.2–5.4)
RBC #/AREA URNS HPF: ABNORMAL /HPF (ref 0–2)
SODIUM SERPL-SCNC: 140 MMOL/L (ref 136–145)
SP GR UR STRIP.AUTO: 1.04 (ref 1–1.03)
SQUAMOUS #/AREA URNS HPF: ABNORMAL /HPF
TRIGL SERPL-MCNC: 92 MG/DL (ref 0–149)
TSH SERPL DL<=0.005 MIU/L-ACNC: 2.36 UIU/ML (ref 0.27–4.2)
UROBILINOGEN UR STRIP.AUTO-MCNC: 1 E.U./DL
VIT B12 SERPL-MCNC: 284 PG/ML (ref 211–946)
WBC # BLD AUTO: 5.3 K/UL (ref 4.8–10.8)
WBC #/AREA URNS HPF: ABNORMAL /HPF (ref 0–5)

## 2023-05-11 ENCOUNTER — OFFICE VISIT (OUTPATIENT)
Dept: INTERNAL MEDICINE | Age: 58
End: 2023-05-11
Payer: COMMERCIAL

## 2023-05-11 VITALS
SYSTOLIC BLOOD PRESSURE: 118 MMHG | BODY MASS INDEX: 36.1 KG/M2 | HEIGHT: 67 IN | WEIGHT: 230 LBS | HEART RATE: 76 BPM | OXYGEN SATURATION: 95 % | DIASTOLIC BLOOD PRESSURE: 70 MMHG

## 2023-05-11 DIAGNOSIS — E66.09 EXOGENOUS OBESITY: ICD-10-CM

## 2023-05-11 DIAGNOSIS — Z78.0 MENOPAUSE: Primary | ICD-10-CM

## 2023-05-11 DIAGNOSIS — E11.9 TYPE 2 DIABETES MELLITUS WITHOUT COMPLICATION, WITHOUT LONG-TERM CURRENT USE OF INSULIN (HCC): ICD-10-CM

## 2023-05-11 DIAGNOSIS — G47.9 SLEEP DIFFICULTIES: ICD-10-CM

## 2023-05-11 DIAGNOSIS — G47.33 OSA ON CPAP: ICD-10-CM

## 2023-05-11 DIAGNOSIS — R31.29 MICROHEMATURIA: ICD-10-CM

## 2023-05-11 DIAGNOSIS — E55.9 VITAMIN D DEFICIENCY: ICD-10-CM

## 2023-05-11 DIAGNOSIS — R80.8 OTHER PROTEINURIA: ICD-10-CM

## 2023-05-11 DIAGNOSIS — E53.8 B12 DEFICIENCY: ICD-10-CM

## 2023-05-11 DIAGNOSIS — Z99.89 OSA ON CPAP: ICD-10-CM

## 2023-05-11 DIAGNOSIS — E53.8 VITAMIN B 12 DEFICIENCY: ICD-10-CM

## 2023-05-11 DIAGNOSIS — I10 ESSENTIAL HYPERTENSION: ICD-10-CM

## 2023-05-11 DIAGNOSIS — Z00.00 ANNUAL PHYSICAL EXAM: ICD-10-CM

## 2023-05-11 DIAGNOSIS — K76.0 NAFLD (NONALCOHOLIC FATTY LIVER DISEASE): ICD-10-CM

## 2023-05-11 DIAGNOSIS — E66.01 MORBID OBESITY (HCC): ICD-10-CM

## 2023-05-11 PROCEDURE — 3074F SYST BP LT 130 MM HG: CPT | Performed by: INTERNAL MEDICINE

## 2023-05-11 PROCEDURE — 3078F DIAST BP <80 MM HG: CPT | Performed by: INTERNAL MEDICINE

## 2023-05-11 PROCEDURE — 99396 PREV VISIT EST AGE 40-64: CPT | Performed by: INTERNAL MEDICINE

## 2023-05-11 RX ORDER — PRAMIPEXOLE DIHYDROCHLORIDE 0.25 MG/1
0.25 TABLET ORAL NIGHTLY
Qty: 30 TABLET | Refills: 2 | Status: SHIPPED | OUTPATIENT
Start: 2023-05-11

## 2023-05-11 RX ORDER — LISINOPRIL 10 MG/1
10 TABLET ORAL DAILY
Qty: 90 TABLET | Refills: 1 | Status: SHIPPED | OUTPATIENT
Start: 2023-05-11 | End: 2023-05-11 | Stop reason: SDUPTHER

## 2023-05-11 RX ORDER — SEMAGLUTIDE 2.68 MG/ML
INJECTION, SOLUTION SUBCUTANEOUS
Qty: 3 ML | Refills: 2 | Status: SHIPPED | OUTPATIENT
Start: 2023-05-11

## 2023-05-11 RX ORDER — LISINOPRIL 5 MG/1
5 TABLET ORAL DAILY
Qty: 90 TABLET | Refills: 1 | Status: SHIPPED | OUTPATIENT
Start: 2023-05-11

## 2023-05-11 ASSESSMENT — ENCOUNTER SYMPTOMS
COUGH: 0
CONSTIPATION: 0
CHEST TIGHTNESS: 0
ABDOMINAL PAIN: 0
WHEEZING: 0
SORE THROAT: 0

## 2023-05-11 NOTE — PROGRESS NOTES
Chief Complaint:   Jose Corea is a 61 y.o. female who presents forcomplete physical exam.    History of Present Illness: Jose Corea is a 61 y.o. female who presents todayfor wellness visit AND follow up on her chronic medical conditions as noted below. Patient Active Problem List    Diagnosis Date Noted    Vitamin D deficiency 01/15/2016    NAFLD (nonalcoholic fatty liver disease) 2012    Family history of esophageal cancer 2012    Mitral valve prolapse        Past Medical History:   Diagnosis Date    ASCUS of cervix with negative high risk HPV 2017    BV (bacterial vaginosis)     HPV (human papilloma virus) infection     Hypertension     Mitral valve prolapse     Morbid obesity ([address]     Seasonal depression ([facility]        Past Surgical History:   Procedure Laterality Date     SECTION      fetal decels/distress, baby was fine    CHOLECYSTECTOMY      lap patsy by Dr. Haresh Velazquez  2012    Paul Bush N/A 2021    Dr Dalton Souza, Sm perianal tags wo bleeding, sm nonbleeding int hemorrhoids, Mild diverticulosis, 5 year recall    COLPOSCOPY  2018    DILATION AND CURETTAGE OF UTERUS N/A 2020    EXAM UNDER ANESTHESIA, HYSTEROSCOPY, MYOSURE, DILATION AND CURETTAGE performed by Laith Hassan MD at [address]  2012    Dr. Doar Burkett, negative JOANNA testing       Current Outpatient Medications   Medication Sig Dispense Refill    lisinopril (PRINIVIL;ZESTRIL) 10 MG tablet Take 1 tablet by mouth daily 90 tablet 1    pramipexole (MIRAPEX) 0.25 MG tablet Take 1 tablet by mouth nightly 30 tablet 2    Semaglutide, 2 MG/DOSE, (OZEMPIC, 2 MG/DOSE,) 8 MG/3ML SOPN INJECT 2 MG INTO THE SKIN ONCE A WEEK.  3 mL 2    citalopram (CELEXA) 20 MG tablet TAKE 1 TABLET BY MOUTH EVERY DAY 90 tablet 1    vitamin D (ERGOCALCIFEROL) 1.25 MG (41104 UT) CAPS capsule TAKE 1 CAPSULE BY MOUTH ONE TIME PER WEEK 12 capsule 1    blood

## 2023-05-15 ENCOUNTER — TELEPHONE (OUTPATIENT)
Dept: NEUROLOGY | Age: 58
End: 2023-05-15

## 2023-05-15 RX ORDER — PRAMIPEXOLE DIHYDROCHLORIDE 0.25 MG/1
0.25 TABLET ORAL NIGHTLY
Qty: 90 TABLET | OUTPATIENT
Start: 2023-05-15

## 2023-05-15 NOTE — TELEPHONE ENCOUNTER
Received a referral from [facility] for this patient. Called and left patient a VM to call our office back to where we can get her scheduled an appointment with Dr. Lashae Tracy.

## 2023-05-26 ENCOUNTER — OFFICE VISIT (OUTPATIENT)
Dept: NEUROLOGY | Age: 58
End: 2023-05-26
Payer: COMMERCIAL

## 2023-05-26 VITALS
WEIGHT: 230 LBS | SYSTOLIC BLOOD PRESSURE: 135 MMHG | BODY MASS INDEX: 36.1 KG/M2 | DIASTOLIC BLOOD PRESSURE: 84 MMHG | OXYGEN SATURATION: 99 % | HEIGHT: 67 IN | HEART RATE: 76 BPM

## 2023-05-26 DIAGNOSIS — G25.81 RESTLESS LEG SYNDROME: ICD-10-CM

## 2023-05-26 DIAGNOSIS — G47.9 SLEEP DISTURBANCE: ICD-10-CM

## 2023-05-26 DIAGNOSIS — R63.4 WEIGHT LOSS: ICD-10-CM

## 2023-05-26 DIAGNOSIS — G47.33 OBSTRUCTIVE SLEEP APNEA: Primary | ICD-10-CM

## 2023-05-26 DIAGNOSIS — R40.0 SOMNOLENCE, DAYTIME: ICD-10-CM

## 2023-05-26 DIAGNOSIS — Z78.9 DIFFICULTY USING CONTINUOUS POSITIVE AIRWAY PRESSURE (CPAP) DEVICE: ICD-10-CM

## 2023-05-26 PROCEDURE — G8427 DOCREV CUR MEDS BY ELIG CLIN: HCPCS | Performed by: PHYSICIAN ASSISTANT

## 2023-05-26 PROCEDURE — 1036F TOBACCO NON-USER: CPT | Performed by: PHYSICIAN ASSISTANT

## 2023-05-26 PROCEDURE — 3017F COLORECTAL CA SCREEN DOC REV: CPT | Performed by: PHYSICIAN ASSISTANT

## 2023-05-26 PROCEDURE — 99204 OFFICE O/P NEW MOD 45 MIN: CPT | Performed by: PHYSICIAN ASSISTANT

## 2023-05-26 PROCEDURE — G8417 CALC BMI ABV UP PARAM F/U: HCPCS | Performed by: PHYSICIAN ASSISTANT

## 2023-05-26 RX ORDER — PRAMIPEXOLE DIHYDROCHLORIDE 0.5 MG/1
TABLET ORAL
Qty: 30 TABLET | Refills: 3 | Status: SHIPPED | OUTPATIENT
Start: 2023-05-26

## 2023-05-26 NOTE — PROGRESS NOTES
REVIEW OF SYSTEMS    Constitutional: []Fever []Sweats []Chills [] Recent Injury   [x] Denies all unless marked  HENT:[]Headache  [] Head Injury  [] Sore Throat  [] Ear Pain  [] Dizziness [] Hearing Loss   [x] Denies all unless marked  Musculoskeletal: [] Arthralgia  [] Myalgias [] Muscle cramps  [] Muscle twitches   [x] Denies all unless marked   Spine:  [] Neck pain  [] Back pain  [] Sciatica  [x] Denies all unless marked  Neurological:[] Visual Disturbance [] Double Vision [] Slurred Speech [] Trouble swallowing  [] Vertigo [] Tingling [] Numbness [] Weakness [] Loss of Balance   [] Loss of Consciousness [] Memory Loss [] Seizures  [x] Denies all unless marked  Psychiatric/Behavioral:[] Depression [] Anxiety  [x] Denies all unless marked  Sleep: []  Insomnia [x] Sleep Disturbance [] Snoring [] Restless Legs [] Daytime Sleepiness [] Sleep Apnea  [] Denies all unless marked
[facility]    Sleep Study with PAP Titration       Orders Placed This Encounter   Medications    pramipexole (MIRAPEX) 0.5 MG tablet     Si q hs     Dispense:  30 tablet     Refill:  3     2. We had a discussion about the clinical issues and went over the various important aspects to consider. Treatment plan and potential diagnostic studies were discussed. Patient advised of the etiology,  pathophysiology, signs, symptoms, diagnosis, treatment options, and risks of untreated AUDREY. Risks may include, but are not limited to  hypertension, coronary artery disease, atrial fibrillation, CHF, diabetes, stroke, weight gain, impaired cognition, daytime somnolence,  and motor vehicle accidents. Advised to abstain from driving or operating heavy machinery when drowsy and the use of respiratory suppressants. Counseled on multimodal approach to treatment of sleep apnea to include but not limited to diet, exercise, sleep hygiene, and compliance with pap therapy, if indicated. Discussed AUDREY treatment options including CPAP therapy, oral dental devices, \"Inspire\" device, and other surgical options. She does not meet the eligibility criteria for Inspire based on BMI at this date. All questions were answered. Pt voiced understanding and agrees with treatment plan. 3.  The following educational material has been included in this visit after visit summary for your review:  AUDREY/PAP guidelines/sleep studies/CPAP-discussed with pt.  4.  If pt requires a PAP device she will be required to be evaluated for clinical benefit and  compliance during a 30 day period within the preceding 90 days of set up.  5.  Will review compliance download [facility] obtain   6. Order-split-night PSG   7. Increase pramipexole to 0.5 mg q hs  8.   Follow up per protocol

## 2023-05-26 NOTE — PATIENT INSTRUCTIONS
Patient education: Sleep apnea in adults       INTRODUCTION -- Normally during sleep, air moves through the throat and in and out of the lungs at a regular rhythm. In a person with sleep apnea, air movement is periodically diminished or stopped. There are two types of sleep apnea: obstructive sleep apnea and central sleep apnea. In obstructive sleep apnea, breathing is abnormal because of narrowing or closure of the throat. In central sleep apnea, breathing is abnormal because of a change in the breathing control and rhythm. Sleep apnea is a serious condition that can affect a person's ability to safely perform normal daily activities and can affect long term health. Approximately 25 percent of adults are at risk for sleep apnea of some degree. Men are more commonly affected than women. Other risk factors include middle and older age, being overweight or obese, and having a small mouth and throat. This topic review focuses on the most common type of sleep apnea in adults, obstructive sleep apnea (AUDREY). HOW SLEEP APNEA OCCURS -- The throat is surrounded by muscles that control the airway for speaking, swallowing, and breathing. During sleep, these muscles are less active, and this causes the throat to narrow. In most people, this narrowing does not affect breathing. In others, it can cause snoring, sometimes with reduced or completely blocked airflow. A completely blocked airway without airflow is called an obstructive apnea. Partial obstruction with diminished airflow is called a hypopnea. A person may have apnea and hypopnea during sleep. Insufficient breathing due to apnea or hypopnea causes oxygen levels to fall and carbon dioxide to rise. Because the airway is blocked, breathing faster or harder does not help to improve oxygen levels until the airway is reopened. Typically, the obstruction requires the person to awaken to activate the upper airway muscles.  Once the airway is opened, the person then

## 2023-06-19 ENCOUNTER — PATIENT MESSAGE (OUTPATIENT)
Dept: INTERNAL MEDICINE | Age: 58
End: 2023-06-19

## 2023-06-19 DIAGNOSIS — R31.29 MICROHEMATURIA: ICD-10-CM

## 2023-06-19 DIAGNOSIS — R39.9 UTI SYMPTOMS: Primary | ICD-10-CM

## 2023-06-19 DIAGNOSIS — R80.8 OTHER PROTEINURIA: ICD-10-CM

## 2023-06-19 LAB
BACTERIA URNS QL MICRO: NEGATIVE /HPF
BILIRUB UR QL STRIP: NEGATIVE
CLARITY UR: ABNORMAL
COLOR UR: YELLOW
CRYSTALS URNS MICRO: ABNORMAL /HPF
EPI CELLS #/AREA URNS AUTO: 1 /HPF (ref 0–5)
GLUCOSE UR STRIP.AUTO-MCNC: NEGATIVE MG/DL
HGB UR STRIP.AUTO-MCNC: ABNORMAL MG/L
HYALINE CASTS #/AREA URNS AUTO: 13 /HPF (ref 0–8)
KETONES UR STRIP.AUTO-MCNC: NEGATIVE MG/DL
LEUKOCYTE ESTERASE UR QL STRIP.AUTO: ABNORMAL
NITRITE UR QL STRIP.AUTO: NEGATIVE
PH UR STRIP.AUTO: 5.5 [PH] (ref 5–8)
PROT UR STRIP.AUTO-MCNC: ABNORMAL MG/DL
PROT UR-MCNC: 30 MG/DL (ref 15–45)
RBC #/AREA URNS AUTO: 23 /HPF (ref 0–4)
SP GR UR STRIP.AUTO: 1.02 (ref 1–1.03)
UROBILINOGEN UR STRIP.AUTO-MCNC: 0.2 E.U./DL
WBC #/AREA URNS AUTO: 81 /HPF (ref 0–5)

## 2023-06-19 NOTE — TELEPHONE ENCOUNTER
From: Clara Ross  To: Dr. Adriel Salvador: 6/19/2023 2:17 PM CDT  Subject: Urine Analysis    Good Afternoon Ladies - I dropped off my speciman this morning to the lab. Have you seen results? I think I have a UTI or bladder infection. .. sure feels like that

## 2023-06-22 LAB
BACTERIA UR CULT: ABNORMAL
BACTERIA UR CULT: ABNORMAL
ORGANISM: ABNORMAL

## 2023-06-22 RX ORDER — NITROFURANTOIN 25; 75 MG/1; MG/1
100 CAPSULE ORAL 2 TIMES DAILY
Qty: 14 CAPSULE | Refills: 0 | Status: SHIPPED | OUTPATIENT
Start: 2023-06-22 | End: 2023-06-29

## 2023-06-27 RX ORDER — NITROFURANTOIN 25; 75 MG/1; MG/1
100 CAPSULE ORAL 2 TIMES DAILY
Qty: 14 CAPSULE | Refills: 0 | Status: SHIPPED | OUTPATIENT
Start: 2023-06-27 | End: 2023-07-04

## 2023-07-06 RX ORDER — LISINOPRIL 10 MG/1
TABLET ORAL
Qty: 90 TABLET | Refills: 1 | OUTPATIENT
Start: 2023-07-06

## 2023-08-02 ENCOUNTER — HOSPITAL ENCOUNTER (OUTPATIENT)
Dept: SLEEP CENTER | Age: 58
Discharge: HOME | End: 2023-08-04
Payer: COMMERCIAL

## 2023-08-02 PROCEDURE — 95811 POLYSOM 6/>YRS CPAP 4/> PARM: CPT

## 2023-08-03 NOTE — PROGRESS NOTES
[facility]  [address]  Phone [phone] Fax [phone]     Sleep Study Technician Review    Patient Name:  Stephanie Yoon  :   1965  Referring Provider: FERNANDA Mac    Brief History: Stephanie Yoon is a 61 y.o. female with a history of Depression, Diabetes, Hypertension, AUDREY, Obesity, and RLS who has been referred for a sleep study. She was diagnosed with severe AUDREY per HST in 2018. The HST, night 1 revealed an AHI of 32.6. The HST, night 2 revealed an AHI of 97.3. She was prescribed CPAP. She has lost 40 lbs since the HST. She c/o poor sleep and CPAP intolerance. She wakes up with a choking sensation. She has frequent awakenings that she relates to mask discomfort and pressure intolerance. She does not awaken with a gasp. She lives alone and is unaware of snoring through the mask. After several sleep interruptions, she removes the mask. Her bedtime is 10:00 PM and planned wake up time is 6:30 AM. She will go for days without good sleep and maybe once a week will have a good night. Her sleep is non-restorative. She uses a nasal pillow. She has tried other masks. She has uncontrolled RLS. She has muscle jerks throughout the night. She takes pramipexole 0.25 mg q hs. Height:   5' 7\"  Weight:  230 lbs  BMI: 36.0  Neck Circ: 16\"  Mallampati 4  ESS:      Type of Study: PSG/Split  Time Stage Position Snore Hypopnea Obs Apnea [place] Apnea PAP O2   2200 Awake Supine No No No No  RA   2300 2 Right No Yes No No  RA   2400 2 Right No No No No  RA   0100 2 Supine Yes Yes No No  RA   0200 2 Supine No Yes No No Cpap 9 RA   0300 2 Supine No Yes No No Cpap 9 RA   0400 REM Supine No Yes No No Cpap 12 RA   0440 Awake Supine No No No No Cpap  12 RA              Summary: Pt stated that she uses cpap therapy and has had a lost of weight. Pt stated that she does snore. Tech noted that pt had several events while in the supine position.  Tech in to split

## 2023-08-07 DIAGNOSIS — E66.09 EXOGENOUS OBESITY: ICD-10-CM

## 2023-08-07 DIAGNOSIS — E11.9 TYPE 2 DIABETES MELLITUS WITHOUT COMPLICATION, WITHOUT LONG-TERM CURRENT USE OF INSULIN (HCC): ICD-10-CM

## 2023-08-07 RX ORDER — SEMAGLUTIDE 2.68 MG/ML
INJECTION, SOLUTION SUBCUTANEOUS
Qty: 3 ML | Refills: 2 | Status: SHIPPED | OUTPATIENT
Start: 2023-08-07

## 2023-08-07 NOTE — TELEPHONE ENCOUNTER
Juwan Rios called requesting a refill of the below medication which has been pended for you:     Requested Prescriptions     Pending Prescriptions Disp Refills    OZEMPIC, 2 MG/DOSE, 8 MG/3ML SOPN [Pharmacy Med Name: OZEMPIC 8 MG/3 ML (2 MG/DOSE)] 3 mL 2     Sig: INJECT 2 MG INTO THE SKIN ONCE A WEEK.        Last Appointment Date: 5/11/2023  Next Appointment Date: 9/11/2023    Allergies   Allergen Reactions    Amoxil [Amoxicillin Trihydrate] Rash

## 2023-08-16 ENCOUNTER — OFFICE VISIT (OUTPATIENT)
Dept: OBGYN CLINIC | Age: 58
End: 2023-08-16
Payer: COMMERCIAL

## 2023-08-16 VITALS
HEART RATE: 82 BPM | DIASTOLIC BLOOD PRESSURE: 70 MMHG | BODY MASS INDEX: 36.26 KG/M2 | WEIGHT: 231 LBS | SYSTOLIC BLOOD PRESSURE: 103 MMHG | HEIGHT: 67 IN

## 2023-08-16 DIAGNOSIS — Z01.419 ENCOUNTER FOR ANNUAL ROUTINE GYNECOLOGICAL EXAMINATION: Primary | ICD-10-CM

## 2023-08-16 DIAGNOSIS — Z12.39 SCREENING BREAST EXAMINATION: ICD-10-CM

## 2023-08-16 DIAGNOSIS — Z12.31 ENCOUNTER FOR SCREENING MAMMOGRAM FOR BREAST CANCER: ICD-10-CM

## 2023-08-16 DIAGNOSIS — N89.8 VAGINAL DISCHARGE: ICD-10-CM

## 2023-08-16 DIAGNOSIS — Z12.4 SCREENING FOR CERVICAL CANCER: ICD-10-CM

## 2023-08-16 DIAGNOSIS — Z11.51 SCREENING FOR HPV (HUMAN PAPILLOMAVIRUS): ICD-10-CM

## 2023-08-16 PROCEDURE — 99396 PREV VISIT EST AGE 40-64: CPT

## 2023-08-16 ASSESSMENT — ENCOUNTER SYMPTOMS
RESPIRATORY NEGATIVE: 1
ABDOMINAL PAIN: 0
CHEST TIGHTNESS: 0
BACK PAIN: 0
RECTAL PAIN: 0
CONSTIPATION: 1
DIARRHEA: 0
SHORTNESS OF BREATH: 0
NAUSEA: 0

## 2023-08-16 NOTE — PROGRESS NOTES
Pt presents today for pap smear and breast exam.  She also complains of     Last mammogram:   done at [facility]-pt states it was good  Last pap smear:  2022  Contraception:    :  2  Para:  2  AB:    Last bone density: done at [facility] in    Last colonoscopy:   2021

## 2023-08-16 NOTE — PROGRESS NOTES
[facility]  CNM Office Note    Yuly Paz is a 61 y.o. female who presents today for her medical conditions/ complaints as noted below. Chief Complaint   Patient presents with    Annual Exam     Last mammogram:   done at [address] states it was good  Last pap smear:  2022  Contraception:    :  2  Para:  2  AB:    Last bone density: done at [facility] in    Last colonoscopy:   2021    HPI  Barbi Pelayo presents today for annual exam. She is due for pap smear. She had a mammogram earlier this year and it was normal. She denies a history of abnormal mammograms. She has had abnormal pap smears in the past and has had colposcopies. She complains of heavy discharge and states she gets bacterial vaginitis often. She does not know if she has an odor because her smell has not returned since having covid. She complains of some mild urinary leaking with coughing/sneezing. She complains of constipation since her pcp increased her ozempic dosage. She uses Miralax. She denies any vasomotor menopausal symptoms or problems with intercourse. Problems/Complaints today:  1. Encounter for annual routine gynecological examination  2. Screening for cervical cancer  -     PAP SMEAR  3. Screening for HPV (human papillomavirus)  -     Human papillomavirus (HPV) DNA probe thin prep high risk  -     PAP SMEAR  4. Encounter for screening mammogram for breast cancer  -     [facility] DIGITAL SCREEN W OR WO CAD BILATERAL; Future  5. Screening breast examination  6. Vaginal discharge  -     Miscellaneous Sendout 2       Patient Active Problem List   Diagnosis    Mitral valve prolapse    NAFLD (nonalcoholic fatty liver disease)    Family history of esophageal cancer    Vitamin D deficiency    Obstructive sleep apnea    Sleep disturbance    Restless leg syndrome    Somnolence, daytime    Difficulty using continuous positive airway pressure (CPAP) device       No LMP recorded.  Patient is

## 2023-08-18 RX ORDER — METRONIDAZOLE 500 MG/1
500 TABLET ORAL 2 TIMES DAILY
Qty: 14 TABLET | Refills: 0 | Status: SHIPPED | OUTPATIENT
Start: 2023-08-18 | End: 2023-08-25

## 2023-08-19 LAB
HPV HR 12 DNA SPEC QL NAA+PROBE: DETECTED
HPV16 DNA SPEC QL NAA+PROBE: NOT DETECTED
HPV16+18+H RISK 12 DNA SPEC-IMP: ABNORMAL
HPV18 DNA SPEC QL NAA+PROBE: NOT DETECTED

## 2023-08-22 ENCOUNTER — TELEPHONE (OUTPATIENT)
Dept: OBGYN CLINIC | Age: 58
End: 2023-08-22

## 2023-08-22 NOTE — TELEPHONE ENCOUNTER
Got her scheduled 9/12 at 9:00  [facility]

## 2023-08-27 DIAGNOSIS — G47.33 OBSTRUCTIVE SLEEP APNEA: Primary | ICD-10-CM

## 2023-08-28 ENCOUNTER — FOLLOWUP TELEPHONE ENCOUNTER (OUTPATIENT)
Dept: SLEEP CENTER | Age: 58
End: 2023-08-28

## 2023-08-28 NOTE — PROGRESS NOTES
LM for patient with results of sleep study and orders being faxed to [facility] for sleep equipment setup.

## 2023-09-01 ENCOUNTER — OFFICE VISIT (OUTPATIENT)
Dept: NEUROLOGY | Age: 58
End: 2023-09-01
Payer: COMMERCIAL

## 2023-09-01 VITALS
HEIGHT: 67 IN | OXYGEN SATURATION: 99 % | WEIGHT: 230 LBS | SYSTOLIC BLOOD PRESSURE: 120 MMHG | HEART RATE: 81 BPM | DIASTOLIC BLOOD PRESSURE: 82 MMHG | BODY MASS INDEX: 36.1 KG/M2

## 2023-09-01 DIAGNOSIS — G47.33 OBSTRUCTIVE SLEEP APNEA: Primary | ICD-10-CM

## 2023-09-01 DIAGNOSIS — Z78.9 DIFFICULTY USING CONTINUOUS POSITIVE AIRWAY PRESSURE (CPAP) DEVICE: ICD-10-CM

## 2023-09-01 DIAGNOSIS — G25.81 RESTLESS LEG SYNDROME: ICD-10-CM

## 2023-09-01 PROCEDURE — 3017F COLORECTAL CA SCREEN DOC REV: CPT | Performed by: PHYSICIAN ASSISTANT

## 2023-09-01 PROCEDURE — G8427 DOCREV CUR MEDS BY ELIG CLIN: HCPCS | Performed by: PHYSICIAN ASSISTANT

## 2023-09-01 PROCEDURE — G8417 CALC BMI ABV UP PARAM F/U: HCPCS | Performed by: PHYSICIAN ASSISTANT

## 2023-09-01 PROCEDURE — 1036F TOBACCO NON-USER: CPT | Performed by: PHYSICIAN ASSISTANT

## 2023-09-01 PROCEDURE — 99214 OFFICE O/P EST MOD 30 MIN: CPT | Performed by: PHYSICIAN ASSISTANT

## 2023-09-01 NOTE — PROGRESS NOTES
[facility]  [address]  [facility], [address] [place] [address]  Phone [phone]  Fax [phone]       [facility] Sleep Follow Up Encounter      Information:   Patient Name: Luis Antonio Padilla  :   1965  Age:   61 y.o. MRN:   [id number]  Account #:  [de-identified]  Today:                23    Provider:  Kamila Ramirez PA-C    Chief Complaint   Patient presents with    Follow-up    Sleep Apnea     Pt just had sleep study done on 2023        Subjective: Luis Antonio Padilla is a 61 y.o. female  with a history of severe AUDREY diagnosed in 2018. She c/o poor sleep and CPAP intolerance. In review, she c/o poor sleep and CPAP intolerance. She wakes up with a choking sensation. She has frequent awakenings that she relates to mask discomfort and pressure intolerance. She does not awaken with a gasp. She lives alone and is unaware of snoring through the mask. After several sleep interruptions, she removes the mask. Her bedtime is 10:00 PM and planned wake up time is 6:30 AM. She will go for days without good sleep and maybe once a week will have a good night. She falls asleep when sedentary. Her sleep is non-restorative. She uses a nasal pillow. She has tried other masks. She was interested in alternative options for AUDREY treatment and was referred for a split-night PSG. She had a repeat PSG, 2023 that revealed an AHI of 33.6 with O2 desaturations as low as 86%. She desires to consult with ENT for possible candidacy for [facility]ARGAV Implant. She has a previous diagnosis of RLS treated with pramipexole. The dose was increased to 0.5 mg q hs at the last office visit. She is unsure as to it's effectiveness as of today, as she is not sleeping well.          Objective:     Past Medical History:   Diagnosis Date    ASCUS of cervix with negative high risk HPV 2017    BV (bacterial vaginosis)     Diabetes (HCC)     Difficulty using continuous positive airway pressure (CPAP) device     HPV

## 2023-09-01 NOTE — PATIENT INSTRUCTIONS
Care Coordinator Progress Note       Data  Chart reviewed, discussed with interdisciplinary team.   Patient was admitted for kidney transplant.     Concerns with insurance coverage for discharge needs: None  Current Living Situation: Home with  and 's family  Support System: Family  Services Involved: Home care  Transportation at Discharge: Family  Transportation to Medical Appointments: Family  Barriers to Discharge: medically stable     Coordination of Care and Referrals: home care     Assessment  Anticipated discharge in 1-2 days. I have met with patient to assist with discharge planning. Prior to admission patient was independent living with  and 's family in [place], [place].  I have informed patient of daily ATC Clinic visits starting the morning after discharge. Home Care follow up discussed which patient is agreeable to. Choice of Home Care agencies offered, [facility] is preferred.  I have made a referral to  Home Care and Nursing visits have been added to the discharge orders.     Plan  Anticipated Discharge Date: 1-2 days  Anticipated Discharge Plan: home with home care  Intermittent Nursing visits provided by  Home Care after completion of daily ATC clinic visits     Kamini Gibbs 8/5/2019   care coordinator #[phone]     Patient education: Sleep apnea in adults       INTRODUCTION -- Normally during sleep, air moves through the throat and in and out of the lungs at a regular rhythm. In a person with sleep apnea, air movement is periodically diminished or stopped. There are two types of sleep apnea: obstructive sleep apnea and central sleep apnea. In obstructive sleep apnea, breathing is abnormal because of narrowing or closure of the throat. In central sleep apnea, breathing is abnormal because of a change in the breathing control and rhythm. Sleep apnea is a serious condition that can affect a person's ability to safely perform normal daily activities and can affect long term health. Approximately 25 percent of adults are at risk for sleep apnea of some degree. Men are more commonly affected than women. Other risk factors include middle and older age, being overweight or obese, and having a small mouth and throat. This topic review focuses on the most common type of sleep apnea in adults, obstructive sleep apnea (AUDREY). HOW SLEEP APNEA OCCURS -- The throat is surrounded by muscles that control the airway for speaking, swallowing, and breathing. During sleep, these muscles are less active, and this causes the throat to narrow. In most people, this narrowing does not affect breathing. In others, it can cause snoring, sometimes with reduced or completely blocked airflow. A completely blocked airway without airflow is called an obstructive apnea. Partial obstruction with diminished airflow is called a hypopnea. A person may have apnea and hypopnea during sleep. Insufficient breathing due to apnea or hypopnea causes oxygen levels to fall and carbon dioxide to rise. Because the airway is blocked, breathing faster or harder does not help to improve oxygen levels until the airway is reopened. Typically, the obstruction requires the person to awaken to activate the upper airway muscles.  Once the airway is opened, the person then

## 2023-09-06 DIAGNOSIS — E66.01 MORBID OBESITY (HCC): ICD-10-CM

## 2023-09-06 DIAGNOSIS — Z78.0 MENOPAUSE: ICD-10-CM

## 2023-09-06 DIAGNOSIS — E53.8 B12 DEFICIENCY: ICD-10-CM

## 2023-09-06 DIAGNOSIS — I10 ESSENTIAL HYPERTENSION: ICD-10-CM

## 2023-09-06 DIAGNOSIS — Z00.00 ANNUAL PHYSICAL EXAM: ICD-10-CM

## 2023-09-06 DIAGNOSIS — E66.09 EXOGENOUS OBESITY: ICD-10-CM

## 2023-09-06 DIAGNOSIS — G47.33 OSA ON CPAP: ICD-10-CM

## 2023-09-06 DIAGNOSIS — E55.9 VITAMIN D DEFICIENCY: ICD-10-CM

## 2023-09-06 DIAGNOSIS — K76.0 NAFLD (NONALCOHOLIC FATTY LIVER DISEASE): ICD-10-CM

## 2023-09-06 DIAGNOSIS — E53.8 VITAMIN B 12 DEFICIENCY: ICD-10-CM

## 2023-09-06 DIAGNOSIS — G47.9 SLEEP DIFFICULTIES: ICD-10-CM

## 2023-09-06 DIAGNOSIS — E11.9 TYPE 2 DIABETES MELLITUS WITHOUT COMPLICATION, WITHOUT LONG-TERM CURRENT USE OF INSULIN (HCC): ICD-10-CM

## 2023-09-06 LAB
25(OH)D3 SERPL-MCNC: 48.3 NG/ML
ALBUMIN SERPL-MCNC: 4.1 G/DL (ref 3.5–5.2)
ALP SERPL-CCNC: 58 U/L (ref 35–104)
ALT SERPL-CCNC: 25 U/L (ref 5–33)
ANION GAP SERPL CALCULATED.3IONS-SCNC: 10 MMOL/L (ref 7–19)
AST SERPL-CCNC: 25 U/L (ref 5–32)
BILIRUB SERPL-MCNC: 0.4 MG/DL (ref 0.2–1.2)
BUN SERPL-MCNC: 13 MG/DL (ref 6–20)
CALCIUM SERPL-MCNC: 9.3 MG/DL (ref 8.6–10)
CHLORIDE SERPL-SCNC: 105 MMOL/L (ref 98–111)
CHOLEST SERPL-MCNC: 147 MG/DL (ref 160–199)
CO2 SERPL-SCNC: 25 MMOL/L (ref 22–29)
CREAT SERPL-MCNC: 0.8 MG/DL (ref 0.5–0.9)
GLUCOSE SERPL-MCNC: 106 MG/DL (ref 74–109)
HBA1C MFR BLD: 5.8 % (ref 4–6)
HDLC SERPL-MCNC: 54 MG/DL (ref 65–121)
LDLC SERPL CALC-MCNC: 73 MG/DL
POTASSIUM SERPL-SCNC: 4.3 MMOL/L (ref 3.5–5)
PROT SERPL-MCNC: 8.1 G/DL (ref 6.6–8.7)
SODIUM SERPL-SCNC: 140 MMOL/L (ref 136–145)
TRIGL SERPL-MCNC: 98 MG/DL (ref 0–149)
VIT B12 SERPL-MCNC: 483 PG/ML (ref 211–946)

## 2023-09-11 ENCOUNTER — OFFICE VISIT (OUTPATIENT)
Dept: INTERNAL MEDICINE | Age: 58
End: 2023-09-11
Payer: COMMERCIAL

## 2023-09-11 VITALS
HEART RATE: 63 BPM | SYSTOLIC BLOOD PRESSURE: 118 MMHG | DIASTOLIC BLOOD PRESSURE: 78 MMHG | HEIGHT: 67 IN | WEIGHT: 230.6 LBS | OXYGEN SATURATION: 97 % | BODY MASS INDEX: 36.19 KG/M2

## 2023-09-11 DIAGNOSIS — E11.9 TYPE 2 DIABETES MELLITUS WITHOUT COMPLICATION, WITHOUT LONG-TERM CURRENT USE OF INSULIN (HCC): ICD-10-CM

## 2023-09-11 DIAGNOSIS — E66.09 EXOGENOUS OBESITY: ICD-10-CM

## 2023-09-11 DIAGNOSIS — K76.0 NAFLD (NONALCOHOLIC FATTY LIVER DISEASE): ICD-10-CM

## 2023-09-11 DIAGNOSIS — E53.8 VITAMIN B 12 DEFICIENCY: ICD-10-CM

## 2023-09-11 DIAGNOSIS — E55.9 VITAMIN D DEFICIENCY: ICD-10-CM

## 2023-09-11 DIAGNOSIS — E11.9 TYPE 2 DIABETES MELLITUS WITHOUT COMPLICATION, WITHOUT LONG-TERM CURRENT USE OF INSULIN (HCC): Primary | ICD-10-CM

## 2023-09-11 DIAGNOSIS — I10 ESSENTIAL HYPERTENSION: ICD-10-CM

## 2023-09-11 DIAGNOSIS — R31.29 MICROHEMATURIA: ICD-10-CM

## 2023-09-11 PROCEDURE — 3017F COLORECTAL CA SCREEN DOC REV: CPT | Performed by: INTERNAL MEDICINE

## 2023-09-11 PROCEDURE — 3078F DIAST BP <80 MM HG: CPT | Performed by: INTERNAL MEDICINE

## 2023-09-11 PROCEDURE — 3074F SYST BP LT 130 MM HG: CPT | Performed by: INTERNAL MEDICINE

## 2023-09-11 PROCEDURE — G8427 DOCREV CUR MEDS BY ELIG CLIN: HCPCS | Performed by: INTERNAL MEDICINE

## 2023-09-11 PROCEDURE — 1036F TOBACCO NON-USER: CPT | Performed by: INTERNAL MEDICINE

## 2023-09-11 PROCEDURE — G8417 CALC BMI ABV UP PARAM F/U: HCPCS | Performed by: INTERNAL MEDICINE

## 2023-09-11 PROCEDURE — 2022F DILAT RTA XM EVC RTNOPTHY: CPT | Performed by: INTERNAL MEDICINE

## 2023-09-11 PROCEDURE — 99214 OFFICE O/P EST MOD 30 MIN: CPT | Performed by: INTERNAL MEDICINE

## 2023-09-11 PROCEDURE — 3044F HG A1C LEVEL LT 7.0%: CPT | Performed by: INTERNAL MEDICINE

## 2023-09-11 RX ORDER — SEMAGLUTIDE 2.68 MG/ML
INJECTION, SOLUTION SUBCUTANEOUS
Qty: 3 ML | Refills: 2 | Status: SHIPPED | OUTPATIENT
Start: 2023-09-11 | End: 2023-09-11 | Stop reason: SDUPTHER

## 2023-09-11 RX ORDER — LISINOPRIL 10 MG/1
10 TABLET ORAL DAILY
Qty: 90 TABLET | Refills: 1 | Status: SHIPPED | OUTPATIENT
Start: 2023-09-11

## 2023-09-11 RX ORDER — SEMAGLUTIDE 2.68 MG/ML
2 INJECTION, SOLUTION SUBCUTANEOUS WEEKLY
Qty: 9 ML | Refills: 1 | Status: SHIPPED | OUTPATIENT
Start: 2023-09-11

## 2023-09-11 ASSESSMENT — ENCOUNTER SYMPTOMS
ABDOMINAL PAIN: 0
SORE THROAT: 0
CONSTIPATION: 0
COLOR CHANGE: 1
WHEEZING: 0
CHEST TIGHTNESS: 0
COUGH: 0

## 2023-09-11 NOTE — TELEPHONE ENCOUNTER
Cindy Britta called to request a refill on her medication.       Last office visit : 9/11/2023   Next office visit : 1/10/2024     Requested Prescriptions     Pending Prescriptions Disp Refills    Semaglutide, 2 MG/DOSE, (OZEMPIC, 2 MG/DOSE,) 8 MG/3ML SOPN 9 mL 1     Sig: Inject 2 mg into the skin once a week As directed            Ashley Olea MA

## 2023-09-11 NOTE — PROGRESS NOTES
Chief Complaint   Patient presents with    6 Month Follow-Up     History of presenting illness: Andreas Cazares is a60 y.o. female who presents today for follow up on her chronic medical conditions as noted below.     Patient Active Problem List    Diagnosis Date Noted    Obstructive sleep apnea 2023    Sleep disturbance 2023    Restless leg syndrome 2023    Somnolence, daytime 2023    Difficulty using continuous positive airway pressure (CPAP) device 2023    Vitamin D deficiency 01/15/2016    NAFLD (nonalcoholic fatty liver disease) 2012    Family history of esophageal cancer 2012    Mitral valve prolapse      Past Medical History:   Diagnosis Date    ASCUS of cervix with negative high risk HPV 2017    BV (bacterial vaginosis)     Diabetes (HCC)     Difficulty using continuous positive airway pressure (CPAP) device     HPV (human papilloma virus) infection     Hypertension     Mitral valve prolapse     Morbid obesity (HCC)     AUDREY (obstructive sleep apnea)     RLS (restless legs syndrome)     Seasonal depression ([address])     Sleep difficulties       Past Surgical History:   Procedure Laterality Date     SECTION      fetal decels/distress, baby was fine    CHOLECYSTECTOMY      lap patsy by Dr. Chiu Corporal  2012    Suzette Jurado    COLONOSCOPY N/A 2021    Dr Nevaeh Astorga, Sm perianal tags wo bleeding, sm nonbleeding int hemorrhoids, Mild diverticulosis, 5 year recall    COLPOSCOPY  2018    DILATION AND CURETTAGE OF UTERUS N/A 2020    EXAM UNDER ANESTHESIA, HYSTEROSCOPY, MYOSURE, DILATION AND CURETTAGE performed by Daryl Okeefe MD at [address] [place] [address] [id number]  2012    Dr. Wilfred Patino, negative JOANNA testing     Current Outpatient Medications   Medication Sig Dispense Refill    Semaglutide, 2 MG/DOSE, (OZEMPIC, 2 MG/DOSE,) 8 MG/3ML SOPN As directed 3 mL 2    lisinopril (PRINIVIL;ZESTRIL) 10 MG tablet Take 1 tablet

## 2023-09-12 ENCOUNTER — PROCEDURE VISIT (OUTPATIENT)
Dept: OBGYN CLINIC | Age: 58
End: 2023-09-12

## 2023-09-12 VITALS
DIASTOLIC BLOOD PRESSURE: 79 MMHG | SYSTOLIC BLOOD PRESSURE: 123 MMHG | HEIGHT: 67 IN | WEIGHT: 230 LBS | HEART RATE: 75 BPM | BODY MASS INDEX: 36.1 KG/M2

## 2023-09-12 DIAGNOSIS — Z01.812 PRE-PROCEDURE LAB EXAM: ICD-10-CM

## 2023-09-12 DIAGNOSIS — B96.89 BACTERIAL VAGINITIS: ICD-10-CM

## 2023-09-12 DIAGNOSIS — R87.619 ABNORMAL CERVICAL PAPANICOLAOU SMEAR, UNSPECIFIED ABNORMAL PAP FINDING: ICD-10-CM

## 2023-09-12 DIAGNOSIS — N76.0 BACTERIAL VAGINITIS: ICD-10-CM

## 2023-09-12 DIAGNOSIS — R87.611 ATYPICAL SQUAMOUS CELLS CANNOT EXCLUDE HIGH GRADE SQUAMOUS INTRAEPITHELIAL LESION ON CYTOLOGIC SMEAR OF CERVIX (ASC-H): Primary | ICD-10-CM

## 2023-09-12 DIAGNOSIS — B97.7 HPV IN FEMALE: ICD-10-CM

## 2023-09-12 DIAGNOSIS — N89.8 VAGINAL DISCHARGE: ICD-10-CM

## 2023-09-12 LAB
CONTROL: NORMAL
PREGNANCY TEST URINE, POC: NEGATIVE

## 2023-09-12 ASSESSMENT — ENCOUNTER SYMPTOMS
SHORTNESS OF BREATH: 0
ABDOMINAL PAIN: 0
DIARRHEA: 0
CHEST TIGHTNESS: 0
GASTROINTESTINAL NEGATIVE: 1
RESPIRATORY NEGATIVE: 1
CONSTIPATION: 0
RECTAL PAIN: 0
BACK PAIN: 0
NAUSEA: 0

## 2023-09-12 NOTE — PROGRESS NOTES
Pt is here for her colpo today.  Urine is
specimen labelled and sent to pathology, and hemostasis achieved with silver nitrate. Vaginal inspection: vaginal colposcopy not performed. Vulvar colposcopy: vulvar colposcopy not performed. Specimens: endocervical curettage; cervical biopsy at 0:13    Complications: none. Plan:  Specimens labelled and sent to Pathology. Will base further treatment on Pathology findings. Treatment options discussed with patient. Post biopsy instructions given to patient. Diagnosis Orders   1. Atypical squamous cells cannot exclude high grade squamous intraepithelial lesion on cytologic smear of cervix (ASC-H)        2. HPV in female        3. Pre-procedure lab exam  POCT urine pregnancy      4. Vaginal discharge  Miscellaneous Sendout 2      5. Abnormal cervical Papanicolaou smear, unspecified abnormal pap finding  Surgical Pathology      6. Bacterial vaginitis            MEDICATIONS:  No orders of the defined types were placed in this encounter.       ORDERS:  Orders Placed This Encounter   Procedures    Miscellaneous Sendout 2    Surgical Pathology    POCT urine pregnancy       PLAN:  Diatherix today  Encouraged vaginal probiotic suppositories

## 2023-09-18 RX ORDER — METRONIDAZOLE 500 MG/1
500 TABLET ORAL 2 TIMES DAILY
Qty: 14 TABLET | Refills: 0 | Status: SHIPPED | OUTPATIENT
Start: 2023-09-18 | End: 2023-09-25

## 2023-09-20 ENCOUNTER — PATIENT MESSAGE (OUTPATIENT)
Dept: INTERNAL MEDICINE | Age: 58
End: 2023-09-20

## 2023-09-20 ENCOUNTER — TELEMEDICINE (OUTPATIENT)
Dept: INTERNAL MEDICINE | Age: 58
End: 2023-09-20
Payer: COMMERCIAL

## 2023-09-20 DIAGNOSIS — R05.1 ACUTE COUGH: ICD-10-CM

## 2023-09-20 DIAGNOSIS — J20.9 ACUTE BRONCHITIS AND BRONCHIOLITIS: Primary | ICD-10-CM

## 2023-09-20 DIAGNOSIS — R53.83 OTHER FATIGUE: ICD-10-CM

## 2023-09-20 DIAGNOSIS — J21.9 ACUTE BRONCHITIS AND BRONCHIOLITIS: Primary | ICD-10-CM

## 2023-09-20 PROCEDURE — G8427 DOCREV CUR MEDS BY ELIG CLIN: HCPCS | Performed by: INTERNAL MEDICINE

## 2023-09-20 PROCEDURE — 99213 OFFICE O/P EST LOW 20 MIN: CPT | Performed by: INTERNAL MEDICINE

## 2023-09-20 PROCEDURE — 3017F COLORECTAL CA SCREEN DOC REV: CPT | Performed by: INTERNAL MEDICINE

## 2023-09-20 PROCEDURE — G8417 CALC BMI ABV UP PARAM F/U: HCPCS | Performed by: INTERNAL MEDICINE

## 2023-09-20 PROCEDURE — 1036F TOBACCO NON-USER: CPT | Performed by: INTERNAL MEDICINE

## 2023-09-20 RX ORDER — PREDNISONE 10 MG/1
10 TABLET ORAL 2 TIMES DAILY
Qty: 6 TABLET | Refills: 0 | Status: SHIPPED | OUTPATIENT
Start: 2023-09-20 | End: 2023-09-23

## 2023-09-20 RX ORDER — ALBUTEROL SULFATE 90 UG/1
2 AEROSOL, METERED RESPIRATORY (INHALATION) 4 TIMES DAILY PRN
Qty: 18 G | Refills: 0 | Status: SHIPPED | OUTPATIENT
Start: 2023-09-20

## 2023-09-20 RX ORDER — AZITHROMYCIN 250 MG/1
250 TABLET, FILM COATED ORAL SEE ADMIN INSTRUCTIONS
Qty: 6 TABLET | Refills: 0 | Status: SHIPPED | OUTPATIENT
Start: 2023-09-20 | End: 2023-09-25

## 2023-09-20 ASSESSMENT — ENCOUNTER SYMPTOMS
COUGH: 1
ABDOMINAL PAIN: 0
CONSTIPATION: 0
CHEST TIGHTNESS: 0
WHEEZING: 0
SORE THROAT: 0

## 2023-09-20 NOTE — PROGRESS NOTES
Chief Complaint   Patient presents with    Pharyngitis     Started feeling weird  evening, then did yard work on Monday and states that her throat was in fire by Monday night, States that she did have a little bit of a cough and then started taking Claritin D yesterday and states the cough is better, just feels run down     Fatigue    Headache     History of presenting illness: Nichelle Miles is a60 y.o. female who presents today for    [address] were provided through a video synchronous discussion virtually to substitute for in-person clinic visit.     Patient Active Problem List    Diagnosis Date Noted    Obstructive sleep apnea 2023    Sleep disturbance 2023    Restless leg syndrome 2023    Somnolence, daytime 2023    Difficulty using continuous positive airway pressure (CPAP) device 2023    Vitamin D deficiency 01/15/2016    NAFLD (nonalcoholic fatty liver disease) 2012    Family history of esophageal cancer 2012    Mitral valve prolapse      Past Medical History:   Diagnosis Date    ASCUS of cervix with negative high risk HPV 2017    BV (bacterial vaginosis)     Diabetes (HCC)     Difficulty using continuous positive airway pressure (CPAP) device     HPV (human papilloma virus) infection     Hypertension     Mitral valve prolapse     Morbid obesity (HCC)     AUDREY (obstructive sleep apnea)     RLS (restless legs syndrome)     Seasonal depression ([address])     Sleep difficulties       Past Surgical History:   Procedure Laterality Date     SECTION      fetal decels/distress, baby was fine    CHOLECYSTECTOMY      lap patsy by Dr. Echeverria Shoulders  2012    Logan Lee    COLONOSCOPY N/A 2021    Dr Mon Sayres, Sm perianal tags wo bleeding, sm nonbleeding int hemorrhoids, Mild diverticulosis, 5 year recall    COLPOSCOPY  2018    DILATION AND CURETTAGE OF UTERUS N/A 2020    EXAM UNDER ANESTHESIA,

## 2023-10-12 RX ORDER — ALBUTEROL SULFATE 90 UG/1
2 AEROSOL, METERED RESPIRATORY (INHALATION) 4 TIMES DAILY PRN
Qty: 8.5 EACH | Refills: 1 | Status: SHIPPED | OUTPATIENT
Start: 2023-10-12

## 2023-10-12 NOTE — TELEPHONE ENCOUNTER
Branden Calvin called to request a refill on her medication. Last office visit : 9/20/2023   Next office visit : 1/10/2024     Requested Prescriptions     Pending Prescriptions Disp Refills    albuterol sulfate HFA (PROVENTIL;VENTOLIN;PROAIR) 108 (90 Base) MCG/ACT inhaler [Pharmacy Med Name: ALBUTEROL HFA (PROAIR) INHALER] 8.5 each 1     Sig: INHALE 2 PUFFS INTO THE LUNGS 4 TIMES DAILY AS NEEDED FOR WHEEZING.             Matt Rosales MA

## 2023-10-14 DIAGNOSIS — F41.8 DEPRESSION WITH ANXIETY: ICD-10-CM

## 2023-10-16 RX ORDER — CITALOPRAM 20 MG/1
TABLET ORAL
Qty: 90 TABLET | Refills: 1 | Status: SHIPPED | OUTPATIENT
Start: 2023-10-16

## 2023-10-16 NOTE — TELEPHONE ENCOUNTER
Rochelle Eckert called to request a refill on her medication.       Last office visit : 9/20/2023   Next office visit : 1/10/2024     Requested Prescriptions     Pending Prescriptions Disp Refills    citalopram (CELEXA) 20 MG tablet [Pharmacy Med Name: CITALOPRAM HBR 20 MG TABLET] 90 tablet 1     Sig: TAKE 1 TABLET BY MOUTH EVERY DAY            Bebeto Christianson

## 2023-11-01 RX ORDER — LISINOPRIL 5 MG/1
5 TABLET ORAL DAILY
Qty: 90 TABLET | Refills: 0 | Status: SHIPPED | OUTPATIENT
Start: 2023-11-01

## 2023-11-01 NOTE — TELEPHONE ENCOUNTER
Cindy Callejas called to request a refill on her medication.       Last office visit : 9/20/2023   Next office visit : 1/10/2024     Requested Prescriptions     Pending Prescriptions Disp Refills    lisinopril (PRINIVIL;ZESTRIL) 5 MG tablet [Pharmacy Med Name: LISINOPRIL 5 MG TABLET] 90 tablet 1     Sig: TAKE 1 TABLET BY MOUTH EVERY DAY            Ashley Olea, [address]

## 2023-11-02 ENCOUNTER — PATIENT MESSAGE (OUTPATIENT)
Dept: INTERNAL MEDICINE | Age: 58
End: 2023-11-02

## 2023-11-02 DIAGNOSIS — G25.81 RESTLESS LEG SYNDROME: ICD-10-CM

## 2023-11-02 RX ORDER — MELOXICAM 15 MG/1
15 TABLET ORAL DAILY
Qty: 15 TABLET | Refills: 0 | Status: SHIPPED | OUTPATIENT
Start: 2023-11-02

## 2023-11-02 RX ORDER — PRAMIPEXOLE DIHYDROCHLORIDE 0.5 MG/1
TABLET ORAL
Qty: 30 TABLET | Refills: 3 | Status: SHIPPED | OUTPATIENT
Start: 2023-11-02

## 2023-11-02 NOTE — TELEPHONE ENCOUNTER
From: Jose Tucker  To: Dr. Carri Meadows: 11/2/2023 10:48 AM CDT  Subject: Refill Pramipexole and a question    Good Morning Ladies     Can Dr Chava Sotomayor refill Pramipexole . 5MG for me? (Monisha Marques originally prescribed it)    Also, my arthritis in my right knee is kicking my Beola Arashana, these past couple of weeks, I've been miserable sleeping, but today, it's starting to bother me just sitting at my desk. Can I get something to KNOCK ME OUT at bedtime? Charity Morrell is not working. Thanks.

## 2023-11-10 ENCOUNTER — PATIENT MESSAGE (OUTPATIENT)
Dept: INTERNAL MEDICINE | Age: 58
End: 2023-11-10

## 2023-11-10 DIAGNOSIS — E11.9 TYPE 2 DIABETES MELLITUS WITHOUT COMPLICATION, WITHOUT LONG-TERM CURRENT USE OF INSULIN (HCC): Primary | ICD-10-CM

## 2023-11-13 RX ORDER — TIRZEPATIDE 12.5 MG/.5ML
12.5 INJECTION, SOLUTION SUBCUTANEOUS WEEKLY
Qty: 2 ML | Refills: 1 | Status: SHIPPED | OUTPATIENT
Start: 2023-11-13

## 2023-11-13 NOTE — TELEPHONE ENCOUNTER
From: Sylvia Lopez  To: Dr. Asiya Boone  Sent: 11/10/2023 8:49 AM CST  Subject: Avanell Sheets Friday Ladies !! Pharmacy is out of Ozempic and I need my shot today. .. HELP! Risk factors: h/o unclear psychiatric history, possible noncompliance with treatment, unable to care for self 2/2 psychiatric illness, , no children, lives alone, acute and chronic medical conditions.    Protective factors: no current SIIP/HIIP, h/o of outpt psychiatric treatment

## 2023-11-13 NOTE — TELEPHONE ENCOUNTER
I have pended Mounjaro for the patient since she can not get the Ozempic 2mg right now. [facility] called to request a refill on her medication.       Last office visit : 9/20/2023   Next office visit : 1/10/2024     Requested Prescriptions     Pending Prescriptions Disp Refills    Tirzepatide (MOUNJARO) 12.5 MG/0.5ML SOPN SC injection 2 mL 1     Sig: Inject 0.5 mLs into the skin once a week            Jimi Levine MA

## 2024-01-29 DIAGNOSIS — G25.81 RESTLESS LEG SYNDROME: ICD-10-CM

## 2024-01-29 RX ORDER — LISINOPRIL 5 MG/1
5 TABLET ORAL DAILY
Qty: 90 TABLET | Refills: 0 | Status: SHIPPED | OUTPATIENT
Start: 2024-01-29

## 2024-01-29 RX ORDER — PRAMIPEXOLE DIHYDROCHLORIDE 0.5 MG/1
TABLET ORAL
Qty: 90 TABLET | Refills: 0 | Status: SHIPPED | OUTPATIENT
Start: 2024-01-29

## 2024-01-29 NOTE — TELEPHONE ENCOUNTER
Zaira Webster called to request a refill on her medication.      Last office visit : 9/20/2023   Next office visit : 1/29/2024     Requested Prescriptions     Pending Prescriptions Disp Refills    lisinopril (PRINIVIL;ZESTRIL) 5 MG tablet [Pharmacy Med Name: LISINOPRIL 5 MG TABLET] 90 tablet 0     Sig: TAKE 1 TABLET BY MOUTH EVERY DAY            Stephanie Teran MA

## 2024-01-29 NOTE — TELEPHONE ENCOUNTER
Zaira Webster called to request a refill on her medication.      Last office visit : 9/20/2023   Next office visit : 3/13/2024     Requested Prescriptions     Pending Prescriptions Disp Refills    pramipexole (MIRAPEX) 0.5 MG tablet [Pharmacy Med Name: PRAMIPEXOLE 0.5 MG TABLET] 90 tablet 1     Sig: TAKE 1 TABLET BY MOUTH EVERYDAY AT BEDTIME            Stephanie Teran MA

## 2024-03-07 DIAGNOSIS — I10 ESSENTIAL HYPERTENSION: ICD-10-CM

## 2024-03-07 DIAGNOSIS — E66.09 EXOGENOUS OBESITY: ICD-10-CM

## 2024-03-07 DIAGNOSIS — K76.0 NAFLD (NONALCOHOLIC FATTY LIVER DISEASE): ICD-10-CM

## 2024-03-07 DIAGNOSIS — E11.9 TYPE 2 DIABETES MELLITUS WITHOUT COMPLICATION, WITHOUT LONG-TERM CURRENT USE OF INSULIN (HCC): ICD-10-CM

## 2024-03-07 DIAGNOSIS — E55.9 VITAMIN D DEFICIENCY: ICD-10-CM

## 2024-03-07 DIAGNOSIS — R31.29 MICROHEMATURIA: ICD-10-CM

## 2024-03-07 DIAGNOSIS — E53.8 VITAMIN B 12 DEFICIENCY: ICD-10-CM

## 2024-03-07 LAB
25(OH)D3 SERPL-MCNC: 37.7 NG/ML
ALBUMIN SERPL-MCNC: 4.3 G/DL (ref 3.5–5.2)
ALP SERPL-CCNC: 66 U/L (ref 35–104)
ALT SERPL-CCNC: 21 U/L (ref 5–33)
ANION GAP SERPL CALCULATED.3IONS-SCNC: 10 MMOL/L (ref 7–19)
AST SERPL-CCNC: 19 U/L (ref 5–32)
BACTERIA URNS QL MICRO: ABNORMAL /HPF
BILIRUB SERPL-MCNC: 0.4 MG/DL (ref 0.2–1.2)
BILIRUB UR QL STRIP: NEGATIVE
BUN SERPL-MCNC: 10 MG/DL (ref 6–20)
CALCIUM SERPL-MCNC: 9.6 MG/DL (ref 8.6–10)
CHLORIDE SERPL-SCNC: 100 MMOL/L (ref 98–111)
CHOLEST SERPL-MCNC: 153 MG/DL (ref 160–199)
CLARITY UR: CLEAR
CO2 SERPL-SCNC: 25 MMOL/L (ref 22–29)
COLOR UR: YELLOW
CREAT SERPL-MCNC: 0.8 MG/DL (ref 0.5–0.9)
CRYSTALS URNS MICRO: ABNORMAL /HPF
EPI CELLS #/AREA URNS AUTO: 1 /HPF (ref 0–5)
GLUCOSE SERPL-MCNC: 124 MG/DL (ref 74–109)
GLUCOSE UR STRIP.AUTO-MCNC: NEGATIVE MG/DL
HBA1C MFR BLD: 5.8 % (ref 4–6)
HDLC SERPL-MCNC: 52 MG/DL (ref 65–121)
HGB UR STRIP.AUTO-MCNC: NEGATIVE MG/L
HYALINE CASTS #/AREA URNS AUTO: 15 /HPF (ref 0–8)
KETONES UR STRIP.AUTO-MCNC: NEGATIVE MG/DL
LDLC SERPL CALC-MCNC: 75 MG/DL
LEUKOCYTE ESTERASE UR QL STRIP.AUTO: ABNORMAL
NITRITE UR QL STRIP.AUTO: NEGATIVE
PH UR STRIP.AUTO: 5.5 [PH] (ref 5–8)
POTASSIUM SERPL-SCNC: 4.3 MMOL/L (ref 3.5–5)
PROT SERPL-MCNC: 7.7 G/DL (ref 6.6–8.7)
PROT UR STRIP.AUTO-MCNC: NEGATIVE MG/DL
RBC #/AREA URNS AUTO: 14 /HPF (ref 0–4)
SODIUM SERPL-SCNC: 135 MMOL/L (ref 136–145)
SP GR UR STRIP.AUTO: 1.02 (ref 1–1.03)
TRIGL SERPL-MCNC: 132 MG/DL (ref 0–149)
TSH SERPL DL<=0.005 MIU/L-ACNC: 3.33 UIU/ML (ref 0.27–4.2)
UROBILINOGEN UR STRIP.AUTO-MCNC: 0.2 E.U./DL
WBC #/AREA URNS AUTO: 43 /HPF (ref 0–5)

## 2024-03-13 ENCOUNTER — OFFICE VISIT (OUTPATIENT)
Dept: INTERNAL MEDICINE | Age: 59
End: 2024-03-13
Payer: COMMERCIAL

## 2024-03-13 VITALS
SYSTOLIC BLOOD PRESSURE: 118 MMHG | DIASTOLIC BLOOD PRESSURE: 72 MMHG | BODY MASS INDEX: 36.57 KG/M2 | HEART RATE: 65 BPM | WEIGHT: 233 LBS | OXYGEN SATURATION: 97 % | HEIGHT: 67 IN

## 2024-03-13 DIAGNOSIS — E53.8 B12 DEFICIENCY: ICD-10-CM

## 2024-03-13 DIAGNOSIS — E66.09 EXOGENOUS OBESITY: ICD-10-CM

## 2024-03-13 DIAGNOSIS — R31.29 MICROHEMATURIA: ICD-10-CM

## 2024-03-13 DIAGNOSIS — E55.9 VITAMIN D DEFICIENCY: ICD-10-CM

## 2024-03-13 DIAGNOSIS — E11.9 TYPE 2 DIABETES MELLITUS WITHOUT COMPLICATION, WITHOUT LONG-TERM CURRENT USE OF INSULIN (HCC): Primary | ICD-10-CM

## 2024-03-13 DIAGNOSIS — I10 ESSENTIAL HYPERTENSION: ICD-10-CM

## 2024-03-13 DIAGNOSIS — Z23 NEED FOR VACCINATION: ICD-10-CM

## 2024-03-13 DIAGNOSIS — M77.8 RIGHT ELBOW TENDINITIS: ICD-10-CM

## 2024-03-13 PROCEDURE — 3044F HG A1C LEVEL LT 7.0%: CPT | Performed by: INTERNAL MEDICINE

## 2024-03-13 PROCEDURE — G8427 DOCREV CUR MEDS BY ELIG CLIN: HCPCS | Performed by: INTERNAL MEDICINE

## 2024-03-13 PROCEDURE — G8484 FLU IMMUNIZE NO ADMIN: HCPCS | Performed by: INTERNAL MEDICINE

## 2024-03-13 PROCEDURE — 3078F DIAST BP <80 MM HG: CPT | Performed by: INTERNAL MEDICINE

## 2024-03-13 PROCEDURE — G8417 CALC BMI ABV UP PARAM F/U: HCPCS | Performed by: INTERNAL MEDICINE

## 2024-03-13 PROCEDURE — 3074F SYST BP LT 130 MM HG: CPT | Performed by: INTERNAL MEDICINE

## 2024-03-13 PROCEDURE — 3017F COLORECTAL CA SCREEN DOC REV: CPT | Performed by: INTERNAL MEDICINE

## 2024-03-13 PROCEDURE — 99214 OFFICE O/P EST MOD 30 MIN: CPT | Performed by: INTERNAL MEDICINE

## 2024-03-13 PROCEDURE — 2022F DILAT RTA XM EVC RTNOPTHY: CPT | Performed by: INTERNAL MEDICINE

## 2024-03-13 PROCEDURE — 1036F TOBACCO NON-USER: CPT | Performed by: INTERNAL MEDICINE

## 2024-03-13 RX ORDER — ERGOCALCIFEROL 1.25 MG/1
CAPSULE ORAL
Qty: 12 CAPSULE | Refills: 1 | Status: SHIPPED | OUTPATIENT
Start: 2024-03-13

## 2024-03-13 RX ORDER — ZOSTER VACCINE RECOMBINANT, ADJUVANTED 50 MCG/0.5
0.5 KIT INTRAMUSCULAR SEE ADMIN INSTRUCTIONS
Qty: 0.5 ML | Refills: 0 | Status: SHIPPED | OUTPATIENT
Start: 2024-03-13 | End: 2024-09-09

## 2024-03-13 RX ORDER — GLUCOSAMINE HCL/CHONDROITIN SU 500-400 MG
CAPSULE ORAL
Qty: 100 STRIP | Refills: 5 | Status: SHIPPED | OUTPATIENT
Start: 2024-03-13

## 2024-03-13 SDOH — ECONOMIC STABILITY: FOOD INSECURITY: WITHIN THE PAST 12 MONTHS, THE FOOD YOU BOUGHT JUST DIDN'T LAST AND YOU DIDN'T HAVE MONEY TO GET MORE.: NEVER TRUE

## 2024-03-13 SDOH — ECONOMIC STABILITY: INCOME INSECURITY: HOW HARD IS IT FOR YOU TO PAY FOR THE VERY BASICS LIKE FOOD, HOUSING, MEDICAL CARE, AND HEATING?: NOT HARD AT ALL

## 2024-03-13 SDOH — ECONOMIC STABILITY: HOUSING INSECURITY
IN THE LAST 12 MONTHS, WAS THERE A TIME WHEN YOU DID NOT HAVE A STEADY PLACE TO SLEEP OR SLEPT IN A SHELTER (INCLUDING NOW)?: NO

## 2024-03-13 SDOH — ECONOMIC STABILITY: FOOD INSECURITY: WITHIN THE PAST 12 MONTHS, YOU WORRIED THAT YOUR FOOD WOULD RUN OUT BEFORE YOU GOT MONEY TO BUY MORE.: NEVER TRUE

## 2024-03-13 ASSESSMENT — PATIENT HEALTH QUESTIONNAIRE - PHQ9
7. TROUBLE CONCENTRATING ON THINGS, SUCH AS READING THE NEWSPAPER OR WATCHING TELEVISION: 0
9. THOUGHTS THAT YOU WOULD BE BETTER OFF DEAD, OR OF HURTING YOURSELF: 0
6. FEELING BAD ABOUT YOURSELF - OR THAT YOU ARE A FAILURE OR HAVE LET YOURSELF OR YOUR FAMILY DOWN: 0
3. TROUBLE FALLING OR STAYING ASLEEP: 3
8. MOVING OR SPEAKING SO SLOWLY THAT OTHER PEOPLE COULD HAVE NOTICED. OR THE OPPOSITE, BEING SO FIGETY OR RESTLESS THAT YOU HAVE BEEN MOVING AROUND A LOT MORE THAN USUAL: 0
SUM OF ALL RESPONSES TO PHQ QUESTIONS 1-9: 6
2. FEELING DOWN, DEPRESSED OR HOPELESS: 1
5. POOR APPETITE OR OVEREATING: 0
SUM OF ALL RESPONSES TO PHQ QUESTIONS 1-9: 6
SUM OF ALL RESPONSES TO PHQ QUESTIONS 1-9: 6
4. FEELING TIRED OR HAVING LITTLE ENERGY: 2
SUM OF ALL RESPONSES TO PHQ9 QUESTIONS 1 & 2: 1
SUM OF ALL RESPONSES TO PHQ QUESTIONS 1-9: 6
1. LITTLE INTEREST OR PLEASURE IN DOING THINGS: 0
10. IF YOU CHECKED OFF ANY PROBLEMS, HOW DIFFICULT HAVE THESE PROBLEMS MADE IT FOR YOU TO DO YOUR WORK, TAKE CARE OF THINGS AT HOME, OR GET ALONG WITH OTHER PEOPLE: 1

## 2024-03-13 ASSESSMENT — ENCOUNTER SYMPTOMS
COUGH: 0
ABDOMINAL PAIN: 0
WHEEZING: 0
SORE THROAT: 0
CONSTIPATION: 0
CHEST TIGHTNESS: 0

## 2024-03-13 NOTE — PROGRESS NOTES
counseling about diet and exercise including education on what calories are, where calories come from, the need for portion control,following lower carbohydrate dietary regimen and healthy snacks along side an active lifestyle with supplementary exercise of approx 30 minutes a week, 5 days a week of exercise for weight loss.  The patient voiced increased understanding of the topics discussed.          AUDREY on CPAP  Has lost weight  Not sleeping well  Wakes up lot  ( Has lost weight)  Suggest sleep specialist eval  Orders     UA 14 rbc  Was 21-30 RBC /23 rbc on prior ua  Needs urology referral    RT elbow tendinitis  Exercises  * use elbow brace  If sx not improving and resolving , if sx continue or re-occur pt has been instructed to call us and / or return here for follow- up evaluation            Orders Placed This Encounter   Procedures    Lipid Panel    Hemoglobin A1C    CBC with Auto Differential    Comprehensive Metabolic Panel    Vitamin D 25 Hydroxy    TSH    Microalbumin / Creatinine Urine Ratio    [facility]     New Prescriptions    ZOSTER RECOMBINANT ADJUVANTED VACCINE (SHINGRIX) 50 MCG/0.5ML SUSR INJECTION    Inject 0.5 mLs into the muscle See Admin Instructions 1 dose now and repeat in 2-6 months      There are no Patient Instructions on file for this visit.    No follow-ups on file.   EMR Dragon/transcription disclaimer:Significant part of this  encounter note is electronic transcription/translation of spoken language to printed text. The electronic translation of spoken language may beerroneous, or at times, nonsensical words or phrases may be inadvertently transcribed. Although I have reviewed the note for such errors, some may still exist.

## 2024-04-16 RX ORDER — LISINOPRIL 5 MG/1
5 TABLET ORAL DAILY
Qty: 90 TABLET | Refills: 1 | Status: SHIPPED | OUTPATIENT
Start: 2024-04-16

## 2024-04-16 NOTE — TELEPHONE ENCOUNTER
Zaira Webster called to request a refill on her medication.      Last office visit : 3/13/2024   Next office visit : 7/3/2024     Requested Prescriptions     Pending Prescriptions Disp Refills    lisinopril (PRINIVIL;ZESTRIL) 5 MG tablet [Pharmacy Med Name: LISINOPRIL 5 MG TABLET] 90 tablet 0     Sig: TAKE 1 TABLET BY MOUTH EVERY DAY            Stephanie Teran MA

## 2024-04-18 ENCOUNTER — OFFICE VISIT (OUTPATIENT)
Dept: UROLOGY | Age: 59
End: 2024-04-18

## 2024-04-18 VITALS — TEMPERATURE: 98.1 F | BODY MASS INDEX: 37.2 KG/M2 | HEIGHT: 67 IN | WEIGHT: 237 LBS

## 2024-04-18 DIAGNOSIS — R31.29 MICROHEMATURIA: Primary | ICD-10-CM

## 2024-04-18 DIAGNOSIS — N89.8 VAGINAL DISCHARGE: ICD-10-CM

## 2024-04-18 LAB
BACTERIA URINE, POC: 0
BILIRUBIN URINE: 1 MG/DL
BLOOD, URINE: NEGATIVE
CASTS URINE, POC: ABNORMAL
CLARITY: CLEAR
COLOR: YELLOW
CRYSTALS URINE, POC: ABNORMAL
EPI CELLS URINE, POC: ABNORMAL
GLUCOSE URINE: ABNORMAL
KETONES, URINE: POSITIVE
LEUKOCYTE EST, POC: ABNORMAL
NITRITE, URINE: NEGATIVE
PH UA: 5.5 (ref 4.5–8)
PROTEIN UA: POSITIVE
RBC URINE, POC: 2
SPECIFIC GRAVITY UA: 1.03 (ref 1–1.03)
UROBILINOGEN, URINE: ABNORMAL
WBC URINE, POC: 5
YEAST URINE, POC: ABNORMAL

## 2024-04-18 ASSESSMENT — ENCOUNTER SYMPTOMS
NAUSEA: 0
VOMITING: 0
BACK PAIN: 0
FACIAL SWELLING: 0
EYE DISCHARGE: 0
CHEST TIGHTNESS: 0
WHEEZING: 0
SORE THROAT: 0
EYE REDNESS: 0

## 2024-04-18 NOTE — PROGRESS NOTES
Micro (Auto)    2. Vaginal discharge  Recurrent history of bacterial vaginosis.  She does feel like this is present again today.  Will go ahead and swab her and send a Diatherix out we will call her with these results on Monday.  If she is positive for BV will need a course of treatment followed with vaginal boric acid suppositories.  May need a follow-up UA as well.      Orders Placed This Encounter   Procedures    CT ABDOMEN PELVIS W WO CONTRAST Additional Contrast? Radiologist Recommendation (Urogram Protocol)     Standing Status:   Future     Standing Expiration Date:   4/18/2025     Scheduling Instructions:      CT for Urogram protocol     Order Specific Question:   Additional Contrast?     Answer:   Radiologist Recommendation     Comments:   Urogram Protocol     Order Specific Question:   STAT Creatinine as needed:     Answer:   Yes     Order Specific Question:   Reason for exam:     Answer:   microhematuria    POCT Urinalysis Dipstick w/ Micro (Auto)        Return for follow up, CT prior to appt, cystoscopy, with Dr. Lama.    All information inputted into the note by the MA to include chief complaint, past medical history, past surgical history, medications, allergies, social and family history and review of systems has been reviewed and updated as needed by me.    EMR Dragon/transcription disclaimer: Much of this documentt is electronic  transcription/translation of spoken language to printed text. The  electronic translation of spoken language may be erroneous, or at times,  nonsensical words or phrases may be inadvertently transcribed. Although I  have reviewed the document for such errors, some may still exist.

## 2024-04-22 DIAGNOSIS — N76.0 BACTERIAL VAGINOSIS: Primary | ICD-10-CM

## 2024-04-22 DIAGNOSIS — B96.89 BACTERIAL VAGINOSIS: Primary | ICD-10-CM

## 2024-04-22 RX ORDER — METRONIDAZOLE 500 MG/1
500 TABLET ORAL 2 TIMES DAILY
Qty: 14 TABLET | Refills: 0 | Status: SHIPPED | OUTPATIENT
Start: 2024-04-22 | End: 2024-04-29

## 2024-04-23 ENCOUNTER — TELEPHONE (OUTPATIENT)
Dept: UROLOGY | Age: 59
End: 2024-04-23

## 2024-04-23 DIAGNOSIS — E66.09 EXOGENOUS OBESITY: ICD-10-CM

## 2024-04-23 DIAGNOSIS — E11.9 TYPE 2 DIABETES MELLITUS WITHOUT COMPLICATION, WITHOUT LONG-TERM CURRENT USE OF INSULIN (HCC): ICD-10-CM

## 2024-04-23 RX ORDER — SEMAGLUTIDE 2.68 MG/ML
INJECTION, SOLUTION SUBCUTANEOUS
Qty: 9 ML | Refills: 3 | Status: SHIPPED | OUTPATIENT
Start: 2024-04-23

## 2024-04-23 NOTE — TELEPHONE ENCOUNTER
Zaira JASON Webster called to request a refill on her medication.      Last office visit : 3/13/2024   Next office visit : 7/3/2024     Requested Prescriptions     Pending Prescriptions Disp Refills    OZEMPIC, 2 MG/DOSE, 8 MG/3ML SOPN sc injection [Pharmacy Med Name: OZEMPIC  2MG SOLUTION PEN-INJECTOR  PEN DOSE] 9 mL 3     Sig: INJECT SUBCUTANEOUSLY 2 MG EVERY WEEK AS DIRECTED            Stephanie Teran MA

## 2024-04-23 NOTE — TELEPHONE ENCOUNTER
I have talked to patient about her Diatherix results showing BV, She is to start Flagyl and boric acid suppositories 600 nightly for 30 days. Patient stated she had gotten them OTC and they were the same mg as the ones we sent in so she said she will just use those instead and get started on the Flagyl.

## 2024-04-26 DIAGNOSIS — G25.81 RESTLESS LEG SYNDROME: ICD-10-CM

## 2024-04-26 RX ORDER — PRAMIPEXOLE DIHYDROCHLORIDE 0.5 MG/1
TABLET ORAL
Qty: 90 TABLET | Refills: 0 | Status: SHIPPED | OUTPATIENT
Start: 2024-04-26

## 2024-05-20 DIAGNOSIS — Z12.31 ENCOUNTER FOR SCREENING MAMMOGRAM FOR BREAST CANCER: ICD-10-CM

## 2024-06-05 DIAGNOSIS — F41.8 DEPRESSION WITH ANXIETY: ICD-10-CM

## 2024-06-05 RX ORDER — CITALOPRAM 20 MG/1
TABLET ORAL
Qty: 90 TABLET | Refills: 1 | Status: SHIPPED | OUTPATIENT
Start: 2024-06-05

## 2024-06-05 NOTE — TELEPHONE ENCOUNTER
Zaira Webster called to request a refill on her medication.      Last office visit : 3/13/2024   Next office visit : 7/3/2024     Requested Prescriptions     Pending Prescriptions Disp Refills    citalopram (CELEXA) 20 MG tablet [Pharmacy Med Name: CITALOPRAM HBR 20 MG TABLET] 90 tablet 1     Sig: TAKE 1 TABLET BY MOUTH EVERY DAY            April Anabelle Lal MA

## 2024-06-13 ENCOUNTER — HOSPITAL ENCOUNTER (OUTPATIENT)
Dept: CT IMAGING | Age: 59
Discharge: HOME | End: 2024-06-13
Payer: COMMERCIAL

## 2024-06-13 DIAGNOSIS — R31.29 MICROHEMATURIA: ICD-10-CM

## 2024-06-13 LAB
CREAT SERPL-MCNC: 0.8 MG/DL (ref 0.3–1.3)
PERFORMED ON: NORMAL

## 2024-06-13 PROCEDURE — 6360000004 HC RX CONTRAST MEDICATION: Performed by: NURSE PRACTITIONER

## 2024-06-13 PROCEDURE — 82565 ASSAY OF CREATININE: CPT

## 2024-06-13 PROCEDURE — 74178 CT ABD&PLV WO CNTR FLWD CNTR: CPT

## 2024-06-13 RX ADMIN — IOPAMIDOL 70 ML: 755 INJECTION, SOLUTION INTRAVENOUS at 13:42

## 2024-06-20 ENCOUNTER — PROCEDURE VISIT (OUTPATIENT)
Dept: UROLOGY | Age: 59
End: 2024-06-20
Payer: COMMERCIAL

## 2024-06-20 VITALS — WEIGHT: 234 LBS | BODY MASS INDEX: 36.73 KG/M2 | HEIGHT: 67 IN | TEMPERATURE: 97.2 F

## 2024-06-20 DIAGNOSIS — R31.29 MICROHEMATURIA: Primary | ICD-10-CM

## 2024-06-20 DIAGNOSIS — N76.0 BACTERIAL VAGINOSIS: ICD-10-CM

## 2024-06-20 DIAGNOSIS — B96.89 BACTERIAL VAGINOSIS: ICD-10-CM

## 2024-06-20 LAB
BACTERIA URINE, POC: ABNORMAL
BILIRUBIN URINE: 1 MG/DL
BLOOD, URINE: POSITIVE
CASTS URINE, POC: 0
CLARITY: ABNORMAL
COLOR: YELLOW
CRYSTALS URINE, POC: 0
EPI CELLS URINE, POC: ABNORMAL
GLUCOSE URINE: ABNORMAL
KETONES, URINE: NEGATIVE
LEUKOCYTE EST, POC: ABNORMAL
NITRITE, URINE: NEGATIVE
PH UA: 5.5 (ref 4.5–8)
PROTEIN UA: ABNORMAL
RBC URINE, POC: 10
SPECIFIC GRAVITY UA: 1.03 (ref 1–1.03)
UROBILINOGEN, URINE: ABNORMAL
WBC URINE, POC: 4
YEAST URINE, POC: 0

## 2024-06-20 PROCEDURE — 52000 CYSTOURETHROSCOPY: CPT | Performed by: UROLOGY

## 2024-06-20 PROCEDURE — 81001 URINALYSIS AUTO W/SCOPE: CPT | Performed by: UROLOGY

## 2024-06-20 NOTE — PROGRESS NOTES
Hematuria: Patient complains of microscopic hematuria. Onset of hematuria was 1 years ago and was gradual in onset. There is not a history of nephrolithiasis. There is not a history of urologic trauma. Other urologic symptoms include urgency, dysuria. Patient admits to history of tobacco use. Patient denies history of Agent Orange exposure, chronic Colon catheter,  surgeries, occupational exposure, trauma, and urolithiasis. Prior workup has been UA'S.  History of tobacco use for approximately 15 years maybe once or twice a week.  No occupational exposure.     She has a history of recurring bacterial vaginosis.  She has had multiple UAs with microscopic hematuria up to 23 RBCs no bacteria noted on her UA's.  No history of recurrent UTIs.  She does have some dysuria today overall feels uncomfortable, some mild vaginal discharge no odor.  Follows with OB/GYN     Cystoscopy Procedure Note    Indications: Diagnosis    Pre-operative Diagnosis: Hematuria    Post-operative Diagnosis: Hematuria    Surgeon: Fuad Lama MD    Assistants: staff    Anesthesia: Local anesthesia topical 2% lidocaine gel     Procedure Details   The risks, benefits, complications, treatment options, and expected outcomes were discussed with the patient. The patient concurred with the proposed plan, giving informed consent.    Cystoscopy was performed today under local anesthesia, using sterile technique. The patient was placed in the lithotomy position, prepped with Hibiclens, and draped in the usual sterile fashion. A 19 [place] sheath rigid cystoscope was used to inspect both the urethra and bladder using the 30 and 70 degree lenses.    Findings:  Anterior urethra: normal without strictures and without scarring.   Bladder: Mild trabeculation, without lesions no ulcerations no papillary tumor seen.  Ureteral orifice(s) was/were seen bilateral. Ureteral orifice(s) both were in the normal location and both ureteral orifices were effluxing

## 2024-07-09 DIAGNOSIS — I10 ESSENTIAL HYPERTENSION: ICD-10-CM

## 2024-07-09 DIAGNOSIS — Z23 NEED FOR VACCINATION: ICD-10-CM

## 2024-07-09 DIAGNOSIS — E53.8 B12 DEFICIENCY: ICD-10-CM

## 2024-07-09 DIAGNOSIS — E11.9 TYPE 2 DIABETES MELLITUS WITHOUT COMPLICATION, WITHOUT LONG-TERM CURRENT USE OF INSULIN (HCC): ICD-10-CM

## 2024-07-09 DIAGNOSIS — R31.29 MICROHEMATURIA: ICD-10-CM

## 2024-07-09 DIAGNOSIS — E55.9 VITAMIN D DEFICIENCY: ICD-10-CM

## 2024-07-09 DIAGNOSIS — E66.09 EXOGENOUS OBESITY: ICD-10-CM

## 2024-07-09 LAB
25(OH)D3 SERPL-MCNC: 33.7 NG/ML
ALBUMIN SERPL-MCNC: 4.2 G/DL (ref 3.5–5.2)
ALP SERPL-CCNC: 61 U/L (ref 35–104)
ALT SERPL-CCNC: 19 U/L (ref 5–33)
ANION GAP SERPL CALCULATED.3IONS-SCNC: 12 MMOL/L (ref 7–19)
AST SERPL-CCNC: 18 U/L (ref 5–32)
BASOPHILS # BLD: 0.1 K/UL (ref 0–0.2)
BASOPHILS NFR BLD: 1 % (ref 0–1)
BILIRUB SERPL-MCNC: 0.5 MG/DL (ref 0.2–1.2)
BUN SERPL-MCNC: 10 MG/DL (ref 6–20)
CALCIUM SERPL-MCNC: 9.2 MG/DL (ref 8.6–10)
CHLORIDE SERPL-SCNC: 101 MMOL/L (ref 98–111)
CHOLEST SERPL-MCNC: 144 MG/DL (ref 160–199)
CO2 SERPL-SCNC: 26 MMOL/L (ref 22–29)
CREAT SERPL-MCNC: 0.8 MG/DL (ref 0.5–0.9)
CREAT UR-MCNC: 452.7 MG/DL (ref 28–217)
EOSINOPHIL # BLD: 0.1 K/UL (ref 0–0.6)
EOSINOPHIL NFR BLD: 1.6 % (ref 0–5)
ERYTHROCYTE [DISTWIDTH] IN BLOOD BY AUTOMATED COUNT: 12.5 % (ref 11.5–14.5)
GLUCOSE SERPL-MCNC: 102 MG/DL (ref 74–109)
HBA1C MFR BLD: 5.5 % (ref 4–6)
HCT VFR BLD AUTO: 43.7 % (ref 37–47)
HDLC SERPL-MCNC: 53 MG/DL (ref 65–121)
HGB BLD-MCNC: 13.8 G/DL (ref 12–16)
IMM GRANULOCYTES # BLD: 0 K/UL
LDLC SERPL CALC-MCNC: 69 MG/DL
LYMPHOCYTES # BLD: 1.9 K/UL (ref 1.1–4.5)
LYMPHOCYTES NFR BLD: 39.1 % (ref 20–40)
MCH RBC QN AUTO: 26.8 PG (ref 27–31)
MCHC RBC AUTO-ENTMCNC: 31.6 G/DL (ref 33–37)
MCV RBC AUTO: 84.9 FL (ref 81–99)
MICROALBUMIN UR-MCNC: 5.3 MG/DL (ref 0–19)
MICROALBUMIN/CREAT UR-RTO: 11.7 MG/G
MONOCYTES # BLD: 0.4 K/UL (ref 0–0.9)
MONOCYTES NFR BLD: 7.1 % (ref 0–10)
NEUTROPHILS # BLD: 2.5 K/UL (ref 1.5–7.5)
NEUTS SEG NFR BLD: 51 % (ref 50–65)
PLATELET # BLD AUTO: 199 K/UL (ref 130–400)
PMV BLD AUTO: 10.2 FL (ref 9.4–12.3)
POTASSIUM SERPL-SCNC: 4.3 MMOL/L (ref 3.5–5)
PROT SERPL-MCNC: 7.5 G/DL (ref 6.6–8.7)
RBC # BLD AUTO: 5.15 M/UL (ref 4.2–5.4)
SODIUM SERPL-SCNC: 139 MMOL/L (ref 136–145)
TRIGL SERPL-MCNC: 110 MG/DL (ref 0–149)
TSH SERPL DL<=0.005 MIU/L-ACNC: 3.42 UIU/ML (ref 0.27–4.2)
WBC # BLD AUTO: 4.9 K/UL (ref 4.8–10.8)

## 2024-07-11 ENCOUNTER — OFFICE VISIT (OUTPATIENT)
Dept: INTERNAL MEDICINE | Age: 59
End: 2024-07-11
Payer: COMMERCIAL

## 2024-07-11 VITALS
OXYGEN SATURATION: 96 % | SYSTOLIC BLOOD PRESSURE: 120 MMHG | HEIGHT: 67 IN | HEART RATE: 77 BPM | WEIGHT: 230 LBS | BODY MASS INDEX: 36.1 KG/M2 | DIASTOLIC BLOOD PRESSURE: 72 MMHG

## 2024-07-11 DIAGNOSIS — K76.0 NAFLD (NONALCOHOLIC FATTY LIVER DISEASE): ICD-10-CM

## 2024-07-11 DIAGNOSIS — E53.8 B12 DEFICIENCY: ICD-10-CM

## 2024-07-11 DIAGNOSIS — F41.8 DEPRESSION WITH ANXIETY: ICD-10-CM

## 2024-07-11 DIAGNOSIS — R93.3 ABNORMAL CT SCAN, SMALL BOWEL: Primary | ICD-10-CM

## 2024-07-11 DIAGNOSIS — E55.9 VITAMIN D DEFICIENCY: ICD-10-CM

## 2024-07-11 DIAGNOSIS — G47.33 OSA ON CPAP: ICD-10-CM

## 2024-07-11 DIAGNOSIS — Z00.00 ANNUAL PHYSICAL EXAM: ICD-10-CM

## 2024-07-11 DIAGNOSIS — I10 ESSENTIAL HYPERTENSION: ICD-10-CM

## 2024-07-11 DIAGNOSIS — E11.9 TYPE 2 DIABETES MELLITUS WITHOUT COMPLICATION, WITHOUT LONG-TERM CURRENT USE OF INSULIN (HCC): ICD-10-CM

## 2024-07-11 PROCEDURE — 3078F DIAST BP <80 MM HG: CPT | Performed by: INTERNAL MEDICINE

## 2024-07-11 PROCEDURE — 99396 PREV VISIT EST AGE 40-64: CPT | Performed by: INTERNAL MEDICINE

## 2024-07-11 PROCEDURE — 3074F SYST BP LT 130 MM HG: CPT | Performed by: INTERNAL MEDICINE

## 2024-07-11 ASSESSMENT — ENCOUNTER SYMPTOMS
CONSTIPATION: 0
SORE THROAT: 0
CHEST TIGHTNESS: 0
COUGH: 0
WHEEZING: 0
ABDOMINAL PAIN: 0

## 2024-07-11 NOTE — PROGRESS NOTES
Chief Complaint:   Zaira Webster is a 58 y.o. female who presents forcomplete physical exam.    History of Present Illness:      Zaira Webster is a 58 y.o. female who presents todayfor wellness visit AND follow up on her chronic medical conditions as noted below.    Patient Active Problem List    Diagnosis Date Noted    Obstructive sleep apnea 2023    Sleep disturbance 2023    Restless leg syndrome 2023    Somnolence, daytime 2023    Difficulty using continuous positive airway pressure (CPAP) device 2023    Vitamin D deficiency 01/15/2016    NAFLD (nonalcoholic fatty liver disease) 2012    Family history of esophageal cancer 2012    Mitral valve prolapse        Past Medical History:   Diagnosis Date    ASCUS of cervix with negative high risk HPV 2017    BV (bacterial vaginosis)     Diabetes (HCC)     Difficulty using continuous positive airway pressure (CPAP) device     HPV (human papilloma virus) infection     Hypertension     Mitral valve prolapse     Morbid obesity (HCC)     AUDREY (obstructive sleep apnea)     RLS (restless legs syndrome)     Seasonal depression (HCC)     Sleep difficulties        Past Surgical History:   Procedure Laterality Date     SECTION      fetal decels/distress, baby was fine    CHOLECYSTECTOMY      lap patsy by      COLONOSCOPY  2012        COLONOSCOPY N/A 2021    Dr Ramsey, Sm perianal tags wo bleeding, sm nonbleeding int hemorrhoids, Mild diverticulosis, 5 year recall    COLPOSCOPY  2018    DILATION AND CURETTAGE OF UTERUS N/A 2020    EXAM UNDER ANESTHESIA, HYSTEROSCOPY, MYOSURE, DILATION AND CURETTAGE performed by Maddie Evans MD at [facility] OR    UPPER GASTROINTESTINAL ENDOSCOPY  2012    Dr. Herrera, negative JOANNA testing       Current Outpatient Medications   Medication Sig Dispense Refill    Boric Acid Vaginal 600 MG SUPP Place 600 mg vaginally at bedtime For a total of 30 days

## 2024-07-18 NOTE — PROGRESS NOTES
YOB: 1965  Location: [facility]  Location Address: [address] [place] [address], [facility], [address] [place] [address]  Location Phone: [phone]    Chief Complaint   Patient presents with    Sleep Apnea       History of Present Illness  Susna Sigala is a 58 y.o. female.  Susan Sigala is here for evaluation of ENT complaints. The patient has had problems with sleep apnea, snoring, fatigue and poor CPAP tolerance.  She is interested in the inspire    Susan Sigala was first diagnosed with sleep apnea 5+ years ago   She has tried using cpap with several different masks/settings as well as a nasal pillow for 5 years.   Currently patient is wearing cpap 8 hours per night.     It smells goodAHI: 33.3 on 2023  BMI 36.02        Past Medical History:   Diagnosis Date    Diabetes mellitus     Hypertension     KEYSHA (obstructive sleep apnea)        Past Surgical History:   Procedure Laterality Date    EYE SURGERY  many years ago    lasik       Outpatient Medications Marked as Taking for the 24 encounter (Office Visit) with Americo Kaminski APRN   Medication Sig Dispense Refill    citalopram (CeleXA) 10 MG tablet Take 1 tablet by mouth Daily.      lisinopril (PRINIVIL,ZESTRIL) 10 MG tablet Take 1 tablet by mouth Daily.      Semaglutide,0.25 or 0.5MG/DOS, (Ozempic, 0.25 or 0.5 MG/DOSE,) 2 MG/1.5ML solution pen-injector Inject  under the skin into the appropriate area as directed 1 (One) Time Per Week.      vitamin D (ERGOCALCIFEROL) 1.25 MG (25091 UT) capsule capsule Take 1 capsule by mouth Every 7 (Seven) Days.         Amoxicillin    Family History   Problem Relation Age of Onset    Hypertension Mother        Social History     Socioeconomic History    Marital status:    Tobacco Use    Smoking status: Never    Smokeless tobacco: Never   Vaping Use    Vaping status: Never Used   Substance and Sexual Activity    Alcohol use: Yes     Alcohol/week: 2.0 standard drinks of alcohol     Types: 1 Glasses of  wine, 1 Cans of beer per week    Drug use: Never    Sexual activity: Never       Review of Systems   Constitutional:  Positive for fatigue.   HENT: Negative.     Respiratory:  Positive for apnea.        Vitals:    07/22/24 1416   BP: 132/85   Pulse: 85   Temp: 97.7 °F (36.5 °C)       Body mass index is 36.02 kg/m².    Objective     Physical Exam  Vitals reviewed.   Constitutional:       Appearance: Normal appearance. She is obese.   HENT:      Head: Normocephalic.      Right Ear: Tympanic membrane, ear canal and external ear normal.      Left Ear: Tympanic membrane, ear canal and external ear normal.      Nose: Nose normal.      Mouth/Throat:      Lips: Pink.      Mouth: Mucous membranes are moist.      Pharynx: Oropharynx is clear.      Tonsils: 2+ on the right. 2+ on the left.      Comments: Gurrola II/III  Musculoskeletal:      Cervical back: Full passive range of motion without pain.   Neurological:      Mental Status: She is alert.   Psychiatric:         Behavior: Behavior is cooperative.         Assessment & Plan   Diagnoses and all orders for this visit:    1. KEYSHA (obstructive sleep apnea) (Primary)  -     Case Request; Standing  -     Basic Metabolic Panel; Future  -     CBC (No Diff); Future  -     ECG 12 Lead; Future  -     XR Chest 1 View; Future  -     Case Request    2. Other fatigue  -     Case Request; Standing  -     Basic Metabolic Panel; Future  -     CBC (No Diff); Future  -     ECG 12 Lead; Future  -     XR Chest 1 View; Future  -     Case Request    3. Snoring  -     Case Request; Standing  -     Basic Metabolic Panel; Future  -     CBC (No Diff); Future  -     ECG 12 Lead; Future  -     XR Chest 1 View; Future  -     Case Request    4. Intolerance of continuous positive airway pressure (CPAP) ventilation  -     Case Request; Standing  -     Basic Metabolic Panel; Future  -     CBC (No Diff); Future  -     ECG 12 Lead; Future  -     XR Chest 1 View; Future  -     Case Request    Other orders  -      Follow Anesthesia Guidelines / Protocol; Future  -     Obtain Informed Consent  -     Follow Anesthesia Guidelines / Protocol; Standing      Videosleep endoscopy (N/A)  Orders Placed This Encounter   Procedures    XR Chest 1 View     Standing Status:   Future     Standing Expiration Date:   7/22/2025     Order Specific Question:   Reason for Exam:     Answer:   pre operative     Order Specific Question:   Release to patient     Answer:   Routine Release [[id number]]    Basic Metabolic Panel     Standing Status:   Future     Standing Expiration Date:   7/22/2025     Order Specific Question:   Release to patient     Answer:   Routine Release [[id number]]    CBC (No Diff)     Standing Status:   Future     Standing Expiration Date:   7/22/2025     Order Specific Question:   Release to patient     Answer:   Routine Release [[id number]]    Obtain Informed Consent     Order Specific Question:   Informed Consent Given For     Answer:   Videosleep endoscopy    ECG 12 Lead     Standing Status:   Future     Standing Expiration Date:   7/22/2025     Order Specific Question:   Reason for Exam:     Answer:   pre operative     Order Specific Question:   Release to patient     Answer:   Routine Release [[id number]]     Video sleep endoscopy discussed with patient she wishes to proceed scheduled for August 26, 2024  BMI under 32 for inspire candidacy    Follow-up with Dr. Corona     Patient Instructions   CONTACT INFORMATION:  The main office phone number is [phone]. For emergencies after hours and on weekends, this number will convert over to our answering service and the on call provider will answer. Please try to keep non emergent phone calls/ questions to office hours 9am-5pm Monday through Friday.      CircleUp  As an alternative, you can sign up and use the Epic MyChart system for more direct and quicker access for non emergent questions/ problems.  EGT allows you to send messages to your doctor, view  your test results, renew your prescriptions, schedule appointments, and more. To sign up, go to [email or web address] and click on the Sign Up Now link in the New User? box. Enter your [employer] Activation Code exactly as it appears below along with the last four digits of your Social Security Number and your Date of Birth () to complete the sign-up process. If you do not sign up before the expiration date, you must request a new code.     [employer] Activation Code: Activation code not generated  Current [employer] Systems Status: Active     If you have questions, you can email [email or web address] or call [phone] to talk to our [employer] Systems staff. Remember, [employer] Systems is NOT to be used for urgent needs. For medical emergencies, dial 911.     IF YOU SMOKE OR USE TOBACCO PLEASE READ THE FOLLOWING:  Why is smoking bad for me?  Smoking increases the risk of heart disease, lung disease, vascular disease, stroke, and cancer. If you smoke, STOP!        IF YOU SMOKE OR USE TOBACCO PLEASE READ THE FOLLOWING:  Why is smoking bad for me?  Smoking increases the risk of heart disease, lung disease, vascular disease, stroke, and cancer. If you smoke, STOP!     For more information:  [employer]uit Now [place]  -QUIT-NOW  [email or web address]

## 2024-07-19 ENCOUNTER — OFFICE VISIT (OUTPATIENT)
Dept: OTOLARYNGOLOGY | Facility: CLINIC | Age: 59
End: 2024-07-19
Payer: COMMERCIAL

## 2024-07-19 DIAGNOSIS — R06.83 SNORING: ICD-10-CM

## 2024-07-19 DIAGNOSIS — R53.83 OTHER FATIGUE: ICD-10-CM

## 2024-07-19 DIAGNOSIS — Z78.9 INTOLERANCE OF CONTINUOUS POSITIVE AIRWAY PRESSURE (CPAP) VENTILATION: ICD-10-CM

## 2024-07-19 DIAGNOSIS — G47.33 OSA (OBSTRUCTIVE SLEEP APNEA): Primary | ICD-10-CM

## 2024-07-19 PROCEDURE — 99203 OFFICE O/P NEW LOW 30 MIN: CPT | Performed by: NURSE PRACTITIONER

## 2024-07-22 VITALS
HEIGHT: 67 IN | SYSTOLIC BLOOD PRESSURE: 132 MMHG | TEMPERATURE: 97.7 F | BODY MASS INDEX: 36.1 KG/M2 | WEIGHT: 230 LBS | DIASTOLIC BLOOD PRESSURE: 85 MMHG | HEART RATE: 85 BPM

## 2024-07-22 RX ORDER — ERGOCALCIFEROL 1.25 MG/1
1 CAPSULE ORAL
COMMUNITY
Start: 2024-03-13

## 2024-07-22 RX ORDER — SEMAGLUTIDE 1.34 MG/ML
INJECTION, SOLUTION SUBCUTANEOUS WEEKLY
COMMUNITY

## 2024-07-22 RX ORDER — LISINOPRIL 10 MG/1
10 TABLET ORAL DAILY
COMMUNITY

## 2024-07-22 RX ORDER — CITALOPRAM HYDROBROMIDE 10 MG/1
10 TABLET ORAL DAILY
COMMUNITY

## 2024-07-22 RX ORDER — ERGOCALCIFEROL (VITAMIN D2) 10 MCG
400 TABLET ORAL DAILY
COMMUNITY

## 2024-07-22 NOTE — PATIENT INSTRUCTIONS
CONTACT INFORMATION:  The main office phone number is [phone]. For emergencies after hours and on weekends, this number will convert over to our answering service and the on call provider will answer. Please try to keep non emergent phone calls/ questions to office hours 9am-5pm Monday through Friday.      Formarum  As an alternative, you can sign up and use the Epic MyChart system for more direct and quicker access for non emergent questions/ problems.  [employer] allows you to send messages to your doctor, view your test results, renew your prescriptions, schedule appointments, and more. To sign up, go to [employer] and click on the Sign Up Now link in the New User? box. Enter your [employer] Activation Code exactly as it appears below along with the last four digits of your Social Security Number and your Date of Birth () to complete the sign-up process. If you do not sign up before the expiration date, you must request a new code.     [employer] Activation Code: Activation code not generated  Current [employer] Status: Active     If you have questions, you can email [email or web address] or call [phone] to talk to our [employer] staff. Remember, [employer] is NOT to be used for urgent needs. For medical emergencies, dial 911.     IF YOU SMOKE OR USE TOBACCO PLEASE READ THE FOLLOWING:  Why is smoking bad for me?  Smoking increases the risk of heart disease, lung disease, vascular disease, stroke, and cancer. If you smoke, STOP!        IF YOU SMOKE OR USE TOBACCO PLEASE READ THE FOLLOWING:  Why is smoking bad for me?  Smoking increases the risk of heart disease, lung disease, vascular disease, stroke, and cancer. If you smoke, STOP!     For more information:  [employer]uit Now [place]  -QUIT-NOW  [email or web address]

## 2024-07-23 ENCOUNTER — TELEPHONE (OUTPATIENT)
Dept: NEUROLOGY | Age: 59
End: 2024-07-23

## 2024-07-23 PROBLEM — R53.83 OTHER FATIGUE: Status: ACTIVE | Noted: 2024-07-19

## 2024-07-23 PROBLEM — R06.83 SNORING: Status: ACTIVE | Noted: 2024-07-19

## 2024-07-23 PROBLEM — Z78.9 INTOLERANCE OF CONTINUOUS POSITIVE AIRWAY PRESSURE (CPAP) VENTILATION: Status: ACTIVE | Noted: 2024-07-19

## 2024-07-23 PROBLEM — G47.33 OSA (OBSTRUCTIVE SLEEP APNEA): Status: ACTIVE | Noted: 2024-07-19

## 2024-07-23 NOTE — TELEPHONE ENCOUNTER
1st attempt: Received a referral from Dr. Evans office for this patient. Called and left patient a VM to call our office back to where we can get patient scheduled an appointment with FERNANDA Crow.     *please schedule patient at the next new patient appointment.

## 2024-07-28 DIAGNOSIS — G25.81 RESTLESS LEG SYNDROME: ICD-10-CM

## 2024-07-29 ENCOUNTER — TELEPHONE (OUTPATIENT)
Dept: NEUROLOGY | Age: 59
End: 2024-07-29

## 2024-07-29 RX ORDER — PRAMIPEXOLE DIHYDROCHLORIDE 0.5 MG/1
TABLET ORAL
Qty: 90 TABLET | Refills: 0 | Status: SHIPPED | OUTPATIENT
Start: 2024-07-29

## 2024-07-29 NOTE — TELEPHONE ENCOUNTER
2nd attempt: Received a referral from Dr. Evans office for this patient. Called and left patient a VM to call our office back to where we can get patient scheduled an appointment with FERNANDA Crow.      *please schedule patient at the next new patient appointment.

## 2024-08-08 ENCOUNTER — TELEPHONE (OUTPATIENT)
Dept: OTOLARYNGOLOGY | Facility: CLINIC | Age: 59
End: 2024-08-08

## 2024-08-08 ENCOUNTER — PATIENT MESSAGE (OUTPATIENT)
Dept: NEUROLOGY | Age: 59
End: 2024-08-08

## 2024-08-08 NOTE — TELEPHONE ENCOUNTER
Caller: TAYO PEREZ    Relationship: SELF    Best call back number: [phone]    What is the best time to reach you: TODAY    Who are you requesting to speak with (clinical staff, provider,  specific staff member):     Do you know the name of the person who called:     What was the call regarding: INCOMING CALL FROM PT. PT WANTS TO KNOW HOW SHE GOES ABOUT FINDING OUT IF INSURANCE COVERS HER UPCOMING Videosleep endoscopy.     Is it okay if the provider responds through MyChart: YES

## 2024-08-19 ENCOUNTER — HOSPITAL ENCOUNTER (OUTPATIENT)
Dept: GENERAL RADIOLOGY | Facility: HOSPITAL | Age: 59
Discharge: HOME | End: 2024-08-19
Payer: COMMERCIAL

## 2024-08-19 ENCOUNTER — PRE-ADMISSION TESTING (OUTPATIENT)
Dept: PREADMISSION TESTING | Facility: HOSPITAL | Age: 59
End: 2024-08-19
Payer: COMMERCIAL

## 2024-08-19 VITALS
SYSTOLIC BLOOD PRESSURE: 146 MMHG | OXYGEN SATURATION: 97 % | HEART RATE: 73 BPM | BODY MASS INDEX: 35.25 KG/M2 | WEIGHT: 232.59 LBS | DIASTOLIC BLOOD PRESSURE: 84 MMHG | HEIGHT: 68 IN | RESPIRATION RATE: 18 BRPM

## 2024-08-19 DIAGNOSIS — G47.33 OSA (OBSTRUCTIVE SLEEP APNEA): ICD-10-CM

## 2024-08-19 DIAGNOSIS — Z78.9 INTOLERANCE OF CONTINUOUS POSITIVE AIRWAY PRESSURE (CPAP) VENTILATION: ICD-10-CM

## 2024-08-19 DIAGNOSIS — R06.83 SNORING: ICD-10-CM

## 2024-08-19 DIAGNOSIS — R53.83 OTHER FATIGUE: ICD-10-CM

## 2024-08-19 LAB
ANION GAP SERPL CALCULATED.3IONS-SCNC: 8 MMOL/L (ref 5–15)
BUN SERPL-MCNC: 12 MG/DL (ref 6–20)
BUN/CREAT SERPL: 16.4 (ref 7–25)
CALCIUM SPEC-SCNC: 9.4 MG/DL (ref 8.6–10.5)
CHLORIDE SERPL-SCNC: 105 MMOL/L (ref 98–107)
CO2 SERPL-SCNC: 29 MMOL/L (ref 22–29)
CREAT SERPL-MCNC: 0.73 MG/DL (ref 0.57–1)
DEPRECATED RDW RBC AUTO: 39.3 FL (ref 37–54)
EGFRCR SERPLBLD CKD-EPI 2021: 94.9 ML/MIN/1.73
ERYTHROCYTE [DISTWIDTH] IN BLOOD BY AUTOMATED COUNT: 13.1 % (ref 12.3–15.4)
GLUCOSE SERPL-MCNC: 103 MG/DL (ref 65–99)
HCT VFR BLD AUTO: 40.3 % (ref 34–46.6)
HGB BLD-MCNC: 13.3 G/DL (ref 12–15.9)
MCH RBC QN AUTO: 27.3 PG (ref 26.6–33)
MCHC RBC AUTO-ENTMCNC: 33 G/DL (ref 31.5–35.7)
MCV RBC AUTO: 82.8 FL (ref 79–97)
PLATELET # BLD AUTO: 203 10*3/MM3 (ref 140–450)
PMV BLD AUTO: 9.8 FL (ref 6–12)
POTASSIUM SERPL-SCNC: 4.3 MMOL/L (ref 3.5–5.2)
RBC # BLD AUTO: 4.87 10*6/MM3 (ref 3.77–5.28)
SODIUM SERPL-SCNC: 142 MMOL/L (ref 136–145)
WBC NRBC COR # BLD AUTO: 5.25 10*3/MM3 (ref 3.4–10.8)

## 2024-08-19 PROCEDURE — 71045 X-RAY EXAM CHEST 1 VIEW: CPT

## 2024-08-19 PROCEDURE — 36415 COLL VENOUS BLD VENIPUNCTURE: CPT

## 2024-08-19 PROCEDURE — 80048 BASIC METABOLIC PNL TOTAL CA: CPT

## 2024-08-19 PROCEDURE — 93005 ELECTROCARDIOGRAM TRACING: CPT

## 2024-08-19 PROCEDURE — 85027 COMPLETE CBC AUTOMATED: CPT

## 2024-08-19 NOTE — DISCHARGE INSTRUCTIONS
Preparing for Surgery  Follow these instructions before the procedure:  Several days or weeks before your procedure  Medication(s) you need to stop   ______1_ week prior to surgery: ozempic       Ask your health care provider about:  Changing or stopping your regular medicines. This is especially important if you are taking diabetes medicines or blood thinners.  Taking medicines such as aspirin and ibuprofen. These medicines can thin your blood. Do not take these medicines unless your health care provider tells you to take them.  Taking over-the-counter medicines, vitamins, herbs, and supplements.    Contact your surgeon if you:  Develop a fever of more than 100.4°F (38°C) or other feelings of illness during the 48 hours before your surgery.  Have symptoms that get worse.  Have questions or concerns about your surgery.  If you are going home the same day of your surgery you will need to arrange for a responsible adult, age 18 years old or older, to drive you home from the hospital and stay with you for 24 hours. Verification of the  will be made prior to any procedure requiring sedation. You may not go home in a taxi or any form of public transportation by yourself.     Day before your procedure  Medication(s) you need to stop the day before your surgery:lisinopril     24 hours before your procedure DO NOT drink alcoholic beverages or smoke.  24 hours before your procedure STOP taking Erectile Dysfunction medication (i.e.,Cialis, Viagra)   You may be asked to shower with a germ-killing soap.  Day of your procedure         8 hours before your scheduled arrival time, STOP all food, any dairy products, and full liquids. This includes hard candy, chewing gum or mints. This is extremely important to prevent serious complications.     Up to 2 hours before your scheduled arrival time, you may have clear liquids no cream, powder, or pulp of any kind. Safe options are water, black coffee, plain tea, soda,  Gatorade/Powerade, clear broth, apple juice.    2 hours before your scheduled arrival time, STOP drinking clear liquids.    You may need to take another shower with a germ-killing soap before you leave home in the morning. Do not use perfumes, colognes, or body lotions.  Wear comfortable loose-fitting clothing.  Remove all jewelry including body piercing and rings, dark colored nail polish, and make up prior to arrival at the hospital. Leave all valuables at home.   Bring your hearing aids if you rely on them.  Do not wear contact lenses. If you wear eyeglasses remember to bring a case to store them in while you are in surgery.  Do not use denture adhesives since you will be asked to remove them during your surgery.    You do not need to bring your home medications into the hospital.   Bring your sleep apnea device with you on the day of your surgery (if this applies to you).  If you wear portable oxygen, bring it with you.   If you are staying overnight, you may bring a bag of items you may need such as slippers, robe and a change of clothes for your discharge. You may want to leave these items in the car until you are ready for them since your family will take your belongings when you leave the pre-operative area.  Arrive at the hospital as scheduled by the office. You will be asked to arrive 2 hours prior to your surgery time in order to prepare for your procedure.  When you arrive at the hospital  Go to the registration desk located at the main entrance of the hospital.  After registration is completed, you will be given a beeper and a sticker sheet. Take the stickers to Outpatient Surgery and place in the tray at the end of the desk to notify the staff that you have arrived and registered.   Return to the lobby to wait. You are not always called back according to the time of arrival but rather the time your doctor will be ready.  When your beeper lights up and vibrates proceed through the double doors, under  the stairs, and a member of the Outpatient Surgery staff will escort you to your preoperative room.

## 2024-08-20 LAB
QT INTERVAL: 410 MS
QTC INTERVAL: 416 MS

## 2024-08-26 ENCOUNTER — HOSPITAL ENCOUNTER (OUTPATIENT)
Facility: HOSPITAL | Age: 59
Setting detail: HOSPITAL OUTPATIENT SURGERY
Discharge: HOME | End: 2024-08-26
Attending: OTOLARYNGOLOGY | Admitting: OTOLARYNGOLOGY
Payer: COMMERCIAL

## 2024-08-26 ENCOUNTER — ANESTHESIA EVENT (OUTPATIENT)
Dept: PERIOP | Facility: HOSPITAL | Age: 59
End: 2024-08-26
Payer: COMMERCIAL

## 2024-08-26 ENCOUNTER — ANESTHESIA (OUTPATIENT)
Dept: PERIOP | Facility: HOSPITAL | Age: 59
End: 2024-08-26
Payer: COMMERCIAL

## 2024-08-26 VITALS
OXYGEN SATURATION: 96 % | DIASTOLIC BLOOD PRESSURE: 78 MMHG | SYSTOLIC BLOOD PRESSURE: 128 MMHG | RESPIRATION RATE: 16 BRPM | TEMPERATURE: 98.4 F | HEART RATE: 60 BPM

## 2024-08-26 LAB
GLUCOSE BLDC GLUCOMTR-MCNC: 94 MG/DL (ref 70–130)
GLUCOSE BLDC GLUCOMTR-MCNC: 99 MG/DL (ref 70–130)

## 2024-08-26 PROCEDURE — 42975 DISE EVAL SLP DO BRTH FLX DX: CPT | Performed by: OTOLARYNGOLOGY

## 2024-08-26 PROCEDURE — 25810000003 LACTATED RINGERS PER 1000 ML: Performed by: OTOLARYNGOLOGY

## 2024-08-26 PROCEDURE — 25010000002 PROPOFOL 1000 MG/100ML EMULSION: Performed by: NURSE ANESTHETIST, CERTIFIED REGISTERED

## 2024-08-26 PROCEDURE — 82948 REAGENT STRIP/BLOOD GLUCOSE: CPT

## 2024-08-26 RX ORDER — ONDANSETRON 4 MG/1
4 TABLET, ORALLY DISINTEGRATING ORAL ONCE AS NEEDED
Status: DISCONTINUED | OUTPATIENT
Start: 2024-08-26 | End: 2024-08-26 | Stop reason: HOSPADM

## 2024-08-26 RX ORDER — SODIUM CHLORIDE, SODIUM LACTATE, POTASSIUM CHLORIDE, CALCIUM CHLORIDE 600; 310; 30; 20 MG/100ML; MG/100ML; MG/100ML; MG/100ML
1000 INJECTION, SOLUTION INTRAVENOUS CONTINUOUS
Status: DISCONTINUED | OUTPATIENT
Start: 2024-08-26 | End: 2024-08-26 | Stop reason: HOSPADM

## 2024-08-26 RX ORDER — HYDROCODONE BITARTRATE AND ACETAMINOPHEN 5; 325 MG/1; MG/1
1 TABLET ORAL EVERY 4 HOURS PRN
Status: DISCONTINUED | OUTPATIENT
Start: 2024-08-26 | End: 2024-08-26 | Stop reason: HOSPADM

## 2024-08-26 RX ORDER — SODIUM CHLORIDE, SODIUM LACTATE, POTASSIUM CHLORIDE, CALCIUM CHLORIDE 600; 310; 30; 20 MG/100ML; MG/100ML; MG/100ML; MG/100ML
9 INJECTION, SOLUTION INTRAVENOUS CONTINUOUS
Status: DISCONTINUED | OUTPATIENT
Start: 2024-08-26 | End: 2024-08-26 | Stop reason: HOSPADM

## 2024-08-26 RX ORDER — HYDROCODONE BITARTRATE AND ACETAMINOPHEN 10; 325 MG/1; MG/1
1 TABLET ORAL EVERY 4 HOURS PRN
Status: DISCONTINUED | OUTPATIENT
Start: 2024-08-26 | End: 2024-08-26 | Stop reason: HOSPADM

## 2024-08-26 RX ORDER — FENTANYL CITRATE 50 UG/ML
25 INJECTION, SOLUTION INTRAMUSCULAR; INTRAVENOUS
Status: DISCONTINUED | OUTPATIENT
Start: 2024-08-26 | End: 2024-08-26 | Stop reason: HOSPADM

## 2024-08-26 RX ORDER — FENTANYL CITRATE 50 UG/ML
50 INJECTION, SOLUTION INTRAMUSCULAR; INTRAVENOUS
Status: DISCONTINUED | OUTPATIENT
Start: 2024-08-26 | End: 2024-08-26 | Stop reason: HOSPADM

## 2024-08-26 RX ORDER — SODIUM CHLORIDE 0.9 % (FLUSH) 0.9 %
10 SYRINGE (ML) INJECTION EVERY 12 HOURS SCHEDULED
Status: DISCONTINUED | OUTPATIENT
Start: 2024-08-26 | End: 2024-08-26 | Stop reason: HOSPADM

## 2024-08-26 RX ORDER — NALOXONE HCL 0.4 MG/ML
0.4 VIAL (ML) INJECTION AS NEEDED
Status: DISCONTINUED | OUTPATIENT
Start: 2024-08-26 | End: 2024-08-26 | Stop reason: HOSPADM

## 2024-08-26 RX ORDER — DEXTROSE MONOHYDRATE 25 G/50ML
12.5 INJECTION, SOLUTION INTRAVENOUS AS NEEDED
Status: DISCONTINUED | OUTPATIENT
Start: 2024-08-26 | End: 2024-08-26 | Stop reason: HOSPADM

## 2024-08-26 RX ORDER — LIDOCAINE HYDROCHLORIDE 10 MG/ML
0.5 INJECTION, SOLUTION EPIDURAL; INFILTRATION; INTRACAUDAL; PERINEURAL ONCE AS NEEDED
Status: DISCONTINUED | OUTPATIENT
Start: 2024-08-26 | End: 2024-08-26 | Stop reason: HOSPADM

## 2024-08-26 RX ORDER — FLUMAZENIL 0.1 MG/ML
0.2 INJECTION INTRAVENOUS AS NEEDED
Status: DISCONTINUED | OUTPATIENT
Start: 2024-08-26 | End: 2024-08-26 | Stop reason: HOSPADM

## 2024-08-26 RX ORDER — LABETALOL HYDROCHLORIDE 5 MG/ML
5 INJECTION, SOLUTION INTRAVENOUS
Status: DISCONTINUED | OUTPATIENT
Start: 2024-08-26 | End: 2024-08-26 | Stop reason: HOSPADM

## 2024-08-26 RX ORDER — IBUPROFEN 600 MG/1
600 TABLET, FILM COATED ORAL EVERY 6 HOURS PRN
Status: DISCONTINUED | OUTPATIENT
Start: 2024-08-26 | End: 2024-08-26 | Stop reason: HOSPADM

## 2024-08-26 RX ORDER — ONDANSETRON 2 MG/ML
4 INJECTION INTRAMUSCULAR; INTRAVENOUS ONCE AS NEEDED
Status: DISCONTINUED | OUTPATIENT
Start: 2024-08-26 | End: 2024-08-26 | Stop reason: HOSPADM

## 2024-08-26 RX ORDER — SODIUM CHLORIDE 0.9 % (FLUSH) 0.9 %
3 SYRINGE (ML) INJECTION AS NEEDED
Status: DISCONTINUED | OUTPATIENT
Start: 2024-08-26 | End: 2024-08-26 | Stop reason: HOSPADM

## 2024-08-26 RX ORDER — SODIUM CHLORIDE 0.9 % (FLUSH) 0.9 %
10 SYRINGE (ML) INJECTION AS NEEDED
Status: DISCONTINUED | OUTPATIENT
Start: 2024-08-26 | End: 2024-08-26 | Stop reason: HOSPADM

## 2024-08-26 RX ORDER — DROPERIDOL 2.5 MG/ML
0.62 INJECTION, SOLUTION INTRAMUSCULAR; INTRAVENOUS ONCE AS NEEDED
Status: DISCONTINUED | OUTPATIENT
Start: 2024-08-26 | End: 2024-08-26 | Stop reason: HOSPADM

## 2024-08-26 RX ORDER — OXYMETAZOLINE HYDROCHLORIDE 0.05 G/100ML
2 SPRAY NASAL
Status: COMPLETED | OUTPATIENT
Start: 2024-08-26 | End: 2024-08-26

## 2024-08-26 RX ORDER — PROPOFOL 10 MG/ML
INJECTION, EMULSION INTRAVENOUS AS NEEDED
Status: DISCONTINUED | OUTPATIENT
Start: 2024-08-26 | End: 2024-08-26 | Stop reason: SURG

## 2024-08-26 RX ADMIN — PROPOFOL 130 MG: 10 INJECTION, EMULSION INTRAVENOUS at 10:56

## 2024-08-26 RX ADMIN — SODIUM CHLORIDE, POTASSIUM CHLORIDE, SODIUM LACTATE AND CALCIUM CHLORIDE 1000 ML: 600; 310; 30; 20 INJECTION, SOLUTION INTRAVENOUS at 08:31

## 2024-08-26 RX ADMIN — Medication 2 SPRAY: at 10:16

## 2024-08-26 NOTE — OP NOTE
.OPERATIVE NOTE  8/26/2024    NAME: Susan Sigala    YOB: 1965  MRN: [id number]    PRE-OPERATIVE DIAGNOSIS:    KEYSHA (obstructive sleep apnea) [G47.33]  Other fatigue [R53.83]  Snoring [R06.83]  Intolerance of continuous positive airway pressure (CPAP) ventilation [Z78.9]    POST-OPERATIVE DIAGNOSIS:   Post-Op Diagnosis Codes:     * KEYSHA (obstructive sleep apnea) [G47.33]     * Other fatigue [R53.83]     * Snoring [R06.83]     * Intolerance of continuous positive airway pressure (CPAP) ventilation [Z78.9]    PROCEDURE PERFORMED:   Drug-induced video sleep endoscopy    SURGEON:   Jeramy Corona MD    ASSISTANT(S):   None    ANESTHESIA:   IV sedation    INDICATIONS: The patient is a 58 y.o. female with KEYSHA (obstructive sleep apnea) [G47.33]  Other fatigue [R53.83]  Snoring [R06.83]  Intolerance of continuous positive airway pressure (CPAP) ventilation [Z78.9]    PROCEDURE:  The patient was brought to the operating room, given IV sedation, and prepped and draped in the usual manner.     The flexible endoscope was inserted to examine both sides of the nose as well as the pharynx and larynx.     The VOTE score at baseline was nearly complete AP collapse with essentially no or very minimal lateral collapse.  With simulated jaw advancement and tongue advancement, the hypopharyngeal obstruction and secondarily the palatal collapse also improved.    Hypopharyngeal and endolaryngeal examination demonstrated moderately large amount of lymphoid hyperplasia at the base of the tongue with moderate interarytenoid edema consistent with laryngopharyngeal reflux.     In summary, there was no In summary, there was no significant evidence of complete concentric palatal obstruction and the patient therefore is a candidate for inspire implantation.      The patient was transported upon completion of the procedure to the postanesthesia care unit in stable condition.    SPECIMENS:  None    COMPLICATIONS:  NONE    ESTIMATED BLOOD LOSS:  None    Jeramy Corona MD  8/26/2024

## 2024-08-26 NOTE — ANESTHESIA PREPROCEDURE EVALUATION
Anesthesia Evaluation     Patient summary reviewed   no history of anesthetic complications:   NPO Solid Status: > 8 hours             Airway   Mallampati: II  Dental      Pulmonary    (+) ,sleep apnea on CPAP  (-) COPD, asthma, not a smoker  Cardiovascular   Exercise tolerance: excellent (>7 METS)    (+) hypertension  (-) pacemaker, past MI, angina, cardiac stents      Neuro/Psych  (-) seizures, TIA, CVA  GI/Hepatic/Renal/Endo    (+) obesity, diabetes mellitus  (-) GERD, liver disease, no renal disease    Musculoskeletal     Abdominal    Substance History      OB/GYN          Other                    Anesthesia Plan    ASA 2     MAC     intravenous induction     Anesthetic plan, risks, benefits, and alternatives have been provided, discussed and informed consent has been obtained with: patient.    CODE STATUS:

## 2024-08-26 NOTE — ANESTHESIA POSTPROCEDURE EVALUATION
Patient: Susan Sigala    Procedure Summary       Date: 08/26/24 Room / Location:  [facility]  /  [facility]    Anesthesia Start: 1055 Anesthesia Stop: 1104    Procedure: Videosleep endoscopy Diagnosis:       KEYSHA (obstructive sleep apnea)      Other fatigue      Snoring      Intolerance of continuous positive airway pressure (CPAP) ventilation      (KEYSHA (obstructive sleep apnea) [G47.33])      (Other fatigue [R53.83])      (Snoring [R06.83])      (Intolerance of continuous positive airway pressure (CPAP) ventilation [Z78.9])    Surgeons: Jeramy Corona MD Provider: Andrea Johansen CRNA    Anesthesia Type: MAC ASA Status: 2            Anesthesia Type: MAC    Vitals  Vitals Value Taken Time   BP     Temp     Pulse 72 08/26/24 1108   Resp     SpO2 96 % 08/26/24 1108   Vitals shown include unfiled device data.        Post Anesthesia Care and Evaluation    Patient location during evaluation: PHASE II  Patient participation: complete - patient participated  Level of consciousness: awake and alert  Pain management: adequate    Airway patency: patent  Anesthetic complications: No anesthetic complications  PONV Status: none  Cardiovascular status: acceptable and stable  Respiratory status: acceptable and unassisted  Hydration status: acceptable

## 2024-08-26 NOTE — H&P
YOB: 1965  Location: [facility]  Location Address: [address] [place] [address], [facility], [address] [place] [address]  Location Phone: [phone]         Chief Complaint   Patient presents with    Sleep Apnea         History of Present Illness  Susan Sigala is a 58 y.o. female.  Susan Sigala is here for evaluation of ENT complaints. The patient has had problems with sleep apnea, snoring, fatigue and poor CPAP tolerance.  She is interested in the inspire     Susan Sigala was first diagnosed with sleep apnea 5+ years ago   She has tried using cpap with several different masks/settings as well as a nasal pillow for 5 years.   Currently patient is wearing cpap 8 hours per night.      It smells goodAHI: 33.3 on 2023  BMI 36.02         Medical History        Past Medical History:   Diagnosis Date    Diabetes mellitus      Hypertension      KEYSHA (obstructive sleep apnea)              Surgical History         Past Surgical History:   Procedure Laterality Date    EYE SURGERY   many years ago     lasik            Medications Taking          Outpatient Medications Marked as Taking for the 24 encounter (Office Visit) with Americo Kaminski APRN   Medication Sig Dispense Refill    citalopram (CeleXA) 10 MG tablet Take 1 tablet by mouth Daily.        lisinopril (PRINIVIL,ZESTRIL) 10 MG tablet Take 1 tablet by mouth Daily.        Semaglutide,0.25 or 0.5MG/DOS, (Ozempic, 0.25 or 0.5 MG/DOSE,) 2 MG/1.5ML solution pen-injector Inject  under the skin into the appropriate area as directed 1 (One) Time Per Week.        vitamin D (ERGOCALCIFEROL) 1.25 MG (00511 UT) capsule capsule Take 1 capsule by mouth Every 7 (Seven) Days.                Amoxicillin           Family History   Problem Relation Age of Onset    Hypertension Mother           Social History   Social History            Socioeconomic History    Marital status:    Tobacco Use    Smoking status: Never    Smokeless tobacco: Never   Vaping Use    Vaping  status: Never Used   Substance and Sexual Activity    Alcohol use: Yes       Alcohol/week: 2.0 standard drinks of alcohol       Types: 1 Glasses of wine, 1 Cans of beer per week    Drug use: Never    Sexual activity: Never            Review of Systems   Constitutional:  Positive for fatigue.   HENT: Negative.     Respiratory:  Positive for apnea.              Vitals:     07/22/24 1416   BP: 132/85   Pulse: 85   Temp: 97.7 °F (36.5 °C)         Body mass index is 36.02 kg/m².        Objective  Physical Exam  Vitals reviewed.   Constitutional:       Appearance: Normal appearance. She is obese.   HENT:      Head: Normocephalic.      Right Ear: Tympanic membrane, ear canal and external ear normal.      Left Ear: Tympanic membrane, ear canal and external ear normal.      Nose: Nose normal.      Mouth/Throat:      Lips: Pink.      Mouth: Mucous membranes are moist.      Pharynx: Oropharynx is clear.      Tonsils: 2+ on the right. 2+ on the left.      Comments: Gurrola II/III  Musculoskeletal:      Cervical back: Full passive range of motion without pain.   Neurological:      Mental Status: She is alert.   Psychiatric:         Behavior: Behavior is cooperative.                  Assessment & Plan  Diagnoses and all orders for this visit:     1. KEYSHA (obstructive sleep apnea) (Primary)  -     Case Request; Standing  -     Basic Metabolic Panel; Future  -     CBC (No Diff); Future  -     ECG 12 Lead; Future  -     XR Chest 1 View; Future  -     Case Request     2. Other fatigue  -     Case Request; Standing  -     Basic Metabolic Panel; Future  -     CBC (No Diff); Future  -     ECG 12 Lead; Future  -     XR Chest 1 View; Future  -     Case Request     3. Snoring  -     Case Request; Standing  -     Basic Metabolic Panel; Future  -     CBC (No Diff); Future  -     ECG 12 Lead; Future  -     XR Chest 1 View; Future  -     Case Request     4. Intolerance of continuous positive airway pressure (CPAP) ventilation  -     Case  Request; Standing  -     Basic Metabolic Panel; Future  -     CBC (No Diff); Future  -     ECG 12 Lead; Future  -     XR Chest 1 View; Future  -     Case Request     Other orders  -     Follow Anesthesia Guidelines / Protocol; Future  -     Obtain Informed Consent  -     Follow Anesthesia Guidelines / Protocol; Standing        Videosleep endoscopy (N/A)        Orders Placed This Encounter   Procedures    XR Chest 1 View       Standing Status:   Future       Standing Expiration Date:   7/22/2025       Order Specific Question:   Reason for Exam:       Answer:   pre operative       Order Specific Question:   Release to patient       Answer:   Routine Release [[id number]]    Basic Metabolic Panel       Standing Status:   Future       Standing Expiration Date:   7/22/2025       Order Specific Question:   Release to patient       Answer:   Routine Release [[id number]]    CBC (No Diff)       Standing Status:   Future       Standing Expiration Date:   7/22/2025       Order Specific Question:   Release to patient       Answer:   Routine Release [[id number]]    Obtain Informed Consent       Order Specific Question:   Informed Consent Given For       Answer:   Videosleep endoscopy    ECG 12 Lead       Standing Status:   Future       Standing Expiration Date:   7/22/2025       Order Specific Question:   Reason for Exam:       Answer:   pre operative       Order Specific Question:   Release to patient       Answer:   Routine Release [[id number]]      Video sleep endoscopy discussed with patient she wishes to proceed scheduled for August 26, 2024  BMI under 32 for inspire candidacy     Follow-up with Dr. Corona

## 2024-08-29 NOTE — PROGRESS NOTES
YOB: 1965  Location: [facility]  Location Address: [address] [place] [address], [facility], [address] [place] [address]  Location Phone: [phone]    Chief Complaint   Patient presents with    post op VSE       History of Present Illness  Susan Sigala is a 58 y.o. female.  Susan Sigala is here for follow up of ENT complaints. The patient has had problems with sleep apnea, snoring, fatigue and poor CPAP tolerance.  She has had a video sleep endoscopy that reveals that she is a candidate for the inspire. She would like to proceed with scheduling.     Susan Sigala was first diagnosed with sleep apnea 5+ years ago   She has tried using cpap with several different masks/settings as well as a nasal pillow for 5 years.   Currently patient is wearing cpap 8 hours per night.     AHI: 33.3 on 2023  BMI: 34.49  [place]: 13     Past Medical History:   Diagnosis Date    Anxiety     Diabetes mellitus     Hypertension     KEYSHA (obstructive sleep apnea)     CPAP       Past Surgical History:   Procedure Laterality Date     SECTION      x1    COLONOSCOPY      ENDOSCOPY      EYE SURGERY  many years ago    lasik    LASIK      SLEEP ENDOSCOPY N/A 2024    Procedure: Videosleep endoscopy;  Surgeon: Jeramy Corona MD;  Location: [facility];  Service: ENT;  Laterality: N/A;       Outpatient Medications Marked as Taking for the 9/3/24 encounter (Office Visit) with Jeramy Corona MD   Medication Sig Dispense Refill    citalopram (CeleXA) 10 MG tablet Take 1 tablet by mouth Daily.      lisinopril (PRINIVIL,ZESTRIL) 10 MG tablet Take 1 tablet by mouth Daily.      Semaglutide,0.25 or 0.5MG/DOS, (Ozempic, 0.25 or 0.5 MG/DOSE,) 2 MG/1.5ML solution pen-injector Inject  under the skin into the appropriate area as directed 1 (One) Time Per Week.      thiamine (VITAMIN B-1) 100 MG tablet  tablet Take 1 tablet by mouth Daily.      vitamin D (ERGOCALCIFEROL) 1.25 MG (78179 UT) capsule capsule Take 1 capsule by mouth  Every 7 (Seven) Days.         Amoxicillin    Family History   Problem Relation Age of Onset    Hypertension Mother        Social History     Socioeconomic History    Marital status:    Tobacco Use    Smoking status: Never    Smokeless tobacco: Never   Vaping Use    Vaping status: Never Used   Substance and Sexual Activity    Alcohol use: Yes     Alcohol/week: 2.0 standard drinks of alcohol     Types: 1 Glasses of wine, 1 Cans of beer per week    Drug use: Never    Sexual activity: Never       Review of Systems   Constitutional:  Positive for fatigue.   HENT: Negative.     Respiratory:  Positive for apnea.        Vitals:    09/03/24 1132   BP: 145/87   Pulse: 76   Temp: 98 °F (36.7 °C)       Body mass index is 34.49 kg/m².    Objective     Physical Exam  Vitals reviewed.   Constitutional:       Appearance: Normal appearance. She is obese.   HENT:      Head: Normocephalic.      Right Ear: Tympanic membrane, ear canal and external ear normal.      Left Ear: Tympanic membrane, ear canal and external ear normal.      Nose: Nose normal.      Mouth/Throat:      Lips: Pink.      Mouth: Mucous membranes are moist.      Pharynx: Oropharynx is clear.      Tonsils: 2+ on the right. 2+ on the left.      Comments: [place]a II/III  Musculoskeletal:      Cervical back: Full passive range of motion without pain.   Neurological:      Mental Status: She is alert.   Psychiatric:         Behavior: Behavior is cooperative.         Assessment & Plan   Diagnoses and all orders for this visit:    1. KEYSHA (obstructive sleep apnea) (Primary)  -     Case Request; Standing  -     Case Request    2. Other fatigue  -     Case Request; Standing  -     Case Request    3. Snoring  -     Case Request; Standing  -     Case Request    4. Intolerance of continuous positive airway pressure (CPAP) ventilation  -     Case Request; Standing  -     Case Request    Other orders  -     cefuroxime (CEFTIN) 500 MG tablet; Take 1 tablet by mouth 2 (Two)  Times a Day for 10 days.  Dispense: 20 tablet; Refill: 0  -     Chlorhexidine Gluconate 4 % solution; Apply 1 Application topically to the appropriate area as directed Daily.  Dispense: 473 mL; Refill: 0  -     mupirocin (BACTROBAN) 2 % nasal ointment; 1 Application into the nostril(s) as directed by provider 2 (Two) Times a Day for 14 days.  Dispense: 28 g; Refill: 0  -     Follow Anesthesia Guidelines / Protocol; Future  -     Provide Patient With Instructions on NPO Status  -     Follow Anesthesia Guidelines / Protocol; Standing  -     Verify NPO Status; Standing  -     Obtain Informed Consent; Standing  -     SCD (Sequential Compression Device) - To Be Placed on Patient in Pre-Op; Standing  -     Patient to Void Prior to Transfer to OR; Standing  -     Instructions for Nursing; Standing      HYPOGLOSSAL NERVE STIMULATION DEVICE IMPLANT (N/A)  Orders Placed This Encounter   Procedures    Provide Patient With Instructions on NPO Status     Shave facial hair 1-3 days prior to surgery. You may have mustache or sideburns, but no facial hair from bottom lip down.   Shave chest hair 3 days prior to surgery. Shave across midline, from clavicle (collarbone) to below the right nipple.   Begin antibiotics 3 days prior to procedure   Wash chest and neck daily for 3 days prior to surgery with chloraprep or hibiclense (can find at local pharmacy)   Intranasal bactroban daily for 7 days prior to surgery and 7 days postoperatively      The risk benefits and options of HYPOGLOSSAL NERVE STIMULATION DEVICE IMPLANT (Inspire) were discussed with the patient including the risks of bleeding, infection, the need for implant removal, malfunctioning, battery replacement and other issues were discussed at length.  Was also discussed that the patient needed to limit movement of the chest and arm for 10 to 12 days postoperatively in order to limit development of seroma or hematoma and therefore subsequent infection.  The patient  understands risk benefits and options and wishes to proceed.    Patient and family were instructed on the proper use of scheduled prescription drugs including their impact on driving and the potential effects during pregnancy.  The potential for overdose was discussed and their safe storage and proper disposal.  The website www.[employer].ky.gov which contains other materials in this regard.      Dr. Corona examined and discussed care with patient and agrees with treatment plan.     Return for post op.       Patient Instructions   Shave facial hair 1-3 days prior to surgery. You may have mustache or sideburns, but no facial hair from bottom lip down.   Shave chest hair 3 days prior to surgery. Shave across midline, from clavicle (collarbone) to below the right nipple.   Begin antibiotics 3 days prior to procedure   Wash chest and neck daily for 3 days prior to surgery with chloraprep or hibiclense (can find at local pharmacy)   Intranasal bactroban daily for 7 days prior to surgery and 7 days postoperatively      The risk benefits and options of HYPOGLOSSAL NERVE STIMULATION DEVICE IMPLANT (Inspire) were discussed with the patient including the risks of bleeding, infection, the need for implant removal, malfunctioning, battery replacement and other issues were discussed at length.  Was also discussed that the patient needed to limit movement of the chest and arm for 10 to 12 days postoperatively in order to limit development of seroma or hematoma and therefore subsequent infection.  The patient understands risk benefits and options and wishes to proceed.    Patient and family were instructed on the proper use of scheduled prescription drugs including their impact on driving and the potential effects during pregnancy.  The potential for overdose was discussed and their safe storage and proper disposal.  The website www.[employer]ky.gov which contains other materials in this regard.

## 2024-09-03 ENCOUNTER — OFFICE VISIT (OUTPATIENT)
Dept: OTOLARYNGOLOGY | Facility: CLINIC | Age: 59
End: 2024-09-03
Payer: COMMERCIAL

## 2024-09-03 VITALS
DIASTOLIC BLOOD PRESSURE: 87 MMHG | TEMPERATURE: 98 F | BODY MASS INDEX: 34.1 KG/M2 | HEART RATE: 76 BPM | WEIGHT: 225 LBS | HEIGHT: 68 IN | SYSTOLIC BLOOD PRESSURE: 145 MMHG

## 2024-09-03 DIAGNOSIS — G47.33 OSA (OBSTRUCTIVE SLEEP APNEA): Primary | ICD-10-CM

## 2024-09-03 DIAGNOSIS — Z78.9 INTOLERANCE OF CONTINUOUS POSITIVE AIRWAY PRESSURE (CPAP) VENTILATION: ICD-10-CM

## 2024-09-03 DIAGNOSIS — R06.83 SNORING: ICD-10-CM

## 2024-09-03 DIAGNOSIS — R53.83 OTHER FATIGUE: ICD-10-CM

## 2024-09-03 PROCEDURE — 99214 OFFICE O/P EST MOD 30 MIN: CPT | Performed by: NURSE PRACTITIONER

## 2024-09-03 RX ORDER — CHLORHEXIDINE GLUCONATE 40 MG/ML
1 SOLUTION TOPICAL DAILY
Qty: 473 ML | Refills: 0 | Status: SHIPPED | OUTPATIENT
Start: 2024-09-03

## 2024-09-03 RX ORDER — CEFUROXIME AXETIL 500 MG/1
500 TABLET ORAL 2 TIMES DAILY
Qty: 20 TABLET | Refills: 0 | Status: SHIPPED | OUTPATIENT
Start: 2024-09-03 | End: 2024-09-13

## 2024-09-03 NOTE — PATIENT INSTRUCTIONS
Shave facial hair 1-3 days prior to surgery. You may have mustache or sideburns, but no facial hair from bottom lip down.   Shave chest hair 3 days prior to surgery. Shave across midline, from clavicle (collarbone) to below the right nipple.   Begin antibiotics 3 days prior to procedure   Wash chest and neck daily for 3 days prior to surgery with chloraprep or hibiclense (can find at local pharmacy)   Intranasal bactroban daily for 7 days prior to surgery and 7 days postoperatively      The risk benefits and options of HYPOGLOSSAL NERVE STIMULATION DEVICE IMPLANT (Inspire) were discussed with the patient including the risks of bleeding, infection, the need for implant removal, malfunctioning, battery replacement and other issues were discussed at length.  Was also discussed that the patient needed to limit movement of the chest and arm for 10 to 12 days postoperatively in order to limit development of seroma or hematoma and therefore subsequent infection.  The patient understands risk benefits and options and wishes to proceed.    Patient and family were instructed on the proper use of scheduled prescription drugs including their impact on driving and the potential effects during pregnancy.  The potential for overdose was discussed and their safe storage and proper disposal.  The website www.[employer].ky.gov which contains other materials in this regard.

## 2024-09-12 ENCOUNTER — HOSPITAL ENCOUNTER (OUTPATIENT)
Dept: CT IMAGING | Age: 59
Discharge: HOME | End: 2024-09-12
Payer: COMMERCIAL

## 2024-09-12 DIAGNOSIS — R93.3 ABNORMAL CT SCAN, SMALL BOWEL: Primary | ICD-10-CM

## 2024-09-12 DIAGNOSIS — R93.3 ABNORMAL CT SCAN, SMALL BOWEL: ICD-10-CM

## 2024-09-12 PROCEDURE — 6360000004 HC RX CONTRAST MEDICATION: Performed by: INTERNAL MEDICINE

## 2024-09-12 PROCEDURE — 74178 CT ABD&PLV WO CNTR FLWD CNTR: CPT

## 2024-09-12 RX ORDER — IOPAMIDOL 755 MG/ML
75 INJECTION, SOLUTION INTRAVASCULAR
Status: COMPLETED | OUTPATIENT
Start: 2024-09-12 | End: 2024-09-12

## 2024-09-12 RX ADMIN — IOPAMIDOL 75 ML: 755 INJECTION, SOLUTION INTRAVENOUS at 08:04

## 2024-10-01 ENCOUNTER — PRE-ADMISSION TESTING (OUTPATIENT)
Dept: PREADMISSION TESTING | Facility: HOSPITAL | Age: 59
End: 2024-10-01
Payer: COMMERCIAL

## 2024-10-01 VITALS
HEART RATE: 70 BPM | OXYGEN SATURATION: 96 % | DIASTOLIC BLOOD PRESSURE: 71 MMHG | SYSTOLIC BLOOD PRESSURE: 123 MMHG | RESPIRATION RATE: 18 BRPM

## 2024-10-01 LAB
ANION GAP SERPL CALCULATED.3IONS-SCNC: 9 MMOL/L (ref 5–15)
BUN SERPL-MCNC: 11 MG/DL (ref 6–20)
BUN/CREAT SERPL: 14.3 (ref 7–25)
CALCIUM SPEC-SCNC: 8.9 MG/DL (ref 8.6–10.5)
CHLORIDE SERPL-SCNC: 104 MMOL/L (ref 98–107)
CO2 SERPL-SCNC: 28 MMOL/L (ref 22–29)
CREAT SERPL-MCNC: 0.77 MG/DL (ref 0.57–1)
DEPRECATED RDW RBC AUTO: 39.9 FL (ref 37–54)
EGFRCR SERPLBLD CKD-EPI 2021: 89 ML/MIN/1.73
ERYTHROCYTE [DISTWIDTH] IN BLOOD BY AUTOMATED COUNT: 13 % (ref 12.3–15.4)
GLUCOSE SERPL-MCNC: 106 MG/DL (ref 65–99)
HCT VFR BLD AUTO: 40 % (ref 34–46.6)
HGB BLD-MCNC: 13.2 G/DL (ref 12–15.9)
MCH RBC QN AUTO: 27.8 PG (ref 26.6–33)
MCHC RBC AUTO-ENTMCNC: 33 G/DL (ref 31.5–35.7)
MCV RBC AUTO: 84.2 FL (ref 79–97)
PLATELET # BLD AUTO: 204 10*3/MM3 (ref 140–450)
PMV BLD AUTO: 10 FL (ref 6–12)
POTASSIUM SERPL-SCNC: 4.6 MMOL/L (ref 3.5–5.2)
RBC # BLD AUTO: 4.75 10*6/MM3 (ref 3.77–5.28)
SODIUM SERPL-SCNC: 141 MMOL/L (ref 136–145)
WBC NRBC COR # BLD AUTO: 7.22 10*3/MM3 (ref 3.4–10.8)

## 2024-10-01 PROCEDURE — 85027 COMPLETE CBC AUTOMATED: CPT

## 2024-10-01 PROCEDURE — 80048 BASIC METABOLIC PNL TOTAL CA: CPT

## 2024-10-01 PROCEDURE — 36415 COLL VENOUS BLD VENIPUNCTURE: CPT

## 2024-10-01 NOTE — DISCHARGE INSTRUCTIONS
Preparing for Surgery  Follow these instructions before the procedure:  Several days or weeks before your procedure  Ask your health care provider about:  Changing or stopping your regular medicines. This is especially important if you are taking diabetes medicines or blood thinners.  Taking medicines such as aspirin and ibuprofen. These medicines can thin your blood. Do not take these medicines unless your health care provider tells you to take them.  Taking over-the-counter medicines, vitamins, herbs, and supplements.    Contact your surgeon if you:  Develop a fever of more than 100.4°F (38°C) or other feelings of illness during the 48 hours before your surgery.  Have symptoms that get worse.  Have questions or concerns about your surgery.  If you are going home the same day of your surgery you will need to arrange for a responsible adult, age 18 years old or older, to drive you home from the hospital and stay with you for 24 hours. Verification of the  will be made prior to any procedure requiring sedation. You may not go home in a taxi or any form of public transportation by yourself.     Day before your procedure  DO NOT TAKE OZEMPIC 1 WEEK PRIOR TO SURGERY  Medication(s) you need to stop the day before your surgery:LISINOPRIL    24 hours before your procedure DO NOT drink alcoholic beverages or smoke.  24 hours before your procedure STOP taking Erectile Dysfunction medication (i.e.,Cialis, Viagra)   You may be asked to shower with a germ-killing soap.  Day of your procedure   You may take the following medication(s) the morning of surgery with a sip of water: AS DIRECTED BY YOUR PHYSICIAN      8 hours before your scheduled arrival time, STOP all food, any dairy products, and full liquids. This includes hard candy, chewing gum or mints. This is extremely important to prevent serious complications.     Up to 2 hours before your scheduled arrival time, you may have clear liquids no cream, powder, or pulp of  any kind. Safe options are water, black coffee, plain tea, soda, Gatorade/Powerade, clear broth, apple juice.    2 hours before your scheduled arrival time, STOP drinking clear liquids.    You may need to take another shower with a germ-killing soap before you leave home in the morning. Do not use perfumes, colognes, or body lotions.  Wear comfortable loose-fitting clothing.  Remove all jewelry including body piercing and rings, dark colored nail polish, and make up prior to arrival at the hospital. Leave all valuables at home.   Bring your hearing aids if you rely on them.  Do not wear contact lenses. If you wear eyeglasses remember to bring a case to store them in while you are in surgery.  Do not use denture adhesives since you will be asked to remove them during your surgery.    You do not need to bring your home medications into the hospital.   Bring your sleep apnea device with you on the day of your surgery (if this applies to you).  If you wear portable oxygen, bring it with you.   If you are staying overnight, you may bring a bag of items you may need such as slippers, robe and a change of clothes for your discharge. You may want to leave these items in the car until you are ready for them since your family will take your belongings when you leave the pre-operative area.  Arrive at the hospital as scheduled by the office. You will be asked to arrive 2 hours prior to your surgery time in order to prepare for your procedure.  When you arrive at the hospital  Go to the registration desk located at the main entrance of the hospital.  After registration is completed, you will be given a beeper and a sticker sheet. Take the stickers to Outpatient Surgery and place in the tray at the end of the desk to notify the staff that you have arrived and registered.   Return to the lobby to wait. You are not always called back according to the time of arrival but rather the time your doctor will be ready.  When your beeper  lights up and vibrates proceed through the double doors, under the stairs, and a member of the Outpatient Surgery staff will escort you to your preoperative room.   How to Use Chlorhexidine Before Surgery  Chlorhexidine gluconate (CHG) is a germ-killing (antiseptic) solution that is used to clean the skin. It can get rid of the bacteria that normally live on the skin and can keep them away for about 24 hours. To clean your skin with CHG, you may be given:  A CHG solution to use in the shower or as part of a sponge bath.  A prepackaged cloth that contains CHG.  Cleaning your skin with CHG may help lower the risk for infection:  While you are staying in the intensive care unit of the hospital.  If you have a vascular access, such as a central line, to provide short-term or long-term access to your veins.  If you have a catheter to drain urine from your bladder.  If you are on a ventilator. A ventilator is a machine that helps you breathe by moving air in and out of your lungs.  After surgery.  What are the risks?  Risks of using CHG include:  A skin reaction.  Hearing loss, if CHG gets in your ears and you have a perforated eardrum.  Eye injury, if CHG gets in your eyes and is not rinsed out.  The CHG product catching fire.  Make sure that you avoid smoking and flames after applying CHG to your skin.  Do not use CHG:  If you have a chlorhexidine allergy or have previously reacted to chlorhexidine.  On babies younger than 2 months of age.  How to use CHG solution  Use CHG only as told by your health care provider, and follow the instructions on the label.  Use the full amount of CHG as directed. Usually, this is one bottle.  During a shower    Follow these steps when using CHG solution during a shower (unless your health care provider gives you different instructions):  Start the shower.  Use your normal soap and shampoo to wash your face and hair.  Turn off the shower or move out of the shower stream.  Pour the CHG  onto a clean washcloth. Do not use any type of brush or rough-edged sponge.  Starting at your neck, lather your body down to your toes. Make sure you follow these instructions:  If you will be having surgery, pay special attention to the part of your body where you will be having surgery. Scrub this area for at least 1 minute.  Do not use CHG on your head or face. If the solution gets into your ears or eyes, rinse them well with water.  Avoid your genital area.  Avoid any areas of skin that have broken skin, cuts, or scrapes.  Scrub your back and under your arms. Make sure to wash skin folds.  Let the lather sit on your skin for 1-2 minutes or as long as told by your health care provider.  Thoroughly rinse your entire body in the shower. Make sure that all body creases and crevices are rinsed well.  Dry off with a clean towel. Do not put any substances on your body afterward--such as powder, lotion, or perfume--unless you are told to do so by your health care provider. Only use lotions that are recommended by the .  Put on clean clothes or pajamas.  If it is the night before your surgery, sleep in clean sheets.     During a sponge bath  Follow these steps when using CHG solution during a sponge bath (unless your health care provider gives you different instructions):  Use your normal soap and shampoo to wash your face and hair.  Pour the CHG onto a clean washcloth.  Starting at your neck, lather your body down to your toes. Make sure you follow these instructions:  If you will be having surgery, pay special attention to the part of your body where you will be having surgery. Scrub this area for at least 1 minute.  Do not use CHG on your head or face. If the solution gets into your ears or eyes, rinse them well with water.  Avoid your genital area.  Avoid any areas of skin that have broken skin, cuts, or scrapes.  Scrub your back and under your arms. Make sure to wash skin folds.  Let the lather sit on  your skin for 1-2 minutes or as long as told by your health care provider.  Using a different clean, wet washcloth, thoroughly rinse your entire body. Make sure that all body creases and crevices are rinsed well.  Dry off with a clean towel. Do not put any substances on your body afterward--such as powder, lotion, or perfume--unless you are told to do so by your health care provider. Only use lotions that are recommended by the .  Put on clean clothes or pajamas.  If it is the night before your surgery, sleep in clean sheets.  How to use CHG prepackaged cloths  Only use CHG cloths as told by your health care provider, and follow the instructions on the label.  Use the CHG cloth on clean, dry skin.  Do not use the CHG cloth on your head or face unless your health care provider tells you to.  When washing with the CHG cloth:  Avoid your genital area.  Avoid any areas of skin that have broken skin, cuts, or scrapes.  Before surgery    Follow these steps when using a CHG cloth to clean before surgery (unless your health care provider gives you different instructions):  Using the CHG cloth, vigorously scrub the part of your body where you will be having surgery. Scrub using a back-and-forth motion for 3 minutes. The area on your body should be completely wet with CHG when you are done scrubbing.  Do not rinse. Discard the cloth and let the area air-dry. Do not put any substances on the area afterward, such as powder, lotion, or perfume.  Put on clean clothes or pajamas.  If it is the night before your surgery, sleep in clean sheets.     For general bathing  Follow these steps when using CHG cloths for general bathing (unless your health care provider gives you different instructions).  Use a separate CHG cloth for each area of your body. Make sure you wash between any folds of skin and between your fingers and toes. Wash your body in the following order, switching to a new cloth after each step:  The front of  your neck, shoulders, and chest.  Both of your arms, under your arms, and your hands.  Your stomach and groin area, avoiding the genitals.  Your right leg and foot.  Your left leg and foot.  The back of your neck, your back, and your buttocks.  Do not rinse. Discard the cloth and let the area air-dry. Do not put any substances on your body afterward--such as powder, lotion, or perfume--unless you are told to do so by your health care provider. Only use lotions that are recommended by the .  Put on clean clothes or pajamas.  Contact a health care provider if:  Your skin gets irritated after scrubbing.  You have questions about using your solution or cloth.  You swallow any chlorhexidine. Call your local poison control center (1-169.499.5568 in the [place]  Get help right away if:  Your eyes itch badly, or they become very red or swollen.  Your skin itches badly and is red or swollen.  Your hearing changes.  You have trouble seeing.  You have swelling or tingling in your mouth or throat.  You have trouble breathing.  These symptoms may represent a serious problem that is an emergency. Do not wait to see if the symptoms will go away. Get medical help right away. Call your local emergency services (135 in the [place] Do not drive yourself to the hospital.  Summary  Chlorhexidine gluconate (CHG) is a germ-killing (antiseptic) solution that is used to clean the skin. Cleaning your skin with CHG may help to lower your risk for infection.  You may be given CHG to use for bathing. It may be in a bottle or in a prepackaged cloth to use on your skin. Carefully follow your health care provider's instructions and the instructions on the product label.  Do not use CHG if you have a chlorhexidine allergy.  Contact your health care provider if your skin gets irritated after scrubbing.  This information is not intended to replace advice given to you by your health care provider. Make sure you discuss any questions you have  with your health care provider.  Document Revised: 04/17/2023 Document Reviewed: 02/28/2022  [email or web address] Patient Education © 2023 [email or web address]          Jeramy Corona MD, FACS  Inspire Pre-Operative Instructions     Shave facial hair one week prior to surgery. You may mustache or sideburns but no facial hair from bottom lip down.    Shave chest hair 3 days prior to surgery. Shave across the midline, from clavicle (collarbone) to below the right nipple.     You will be prescribed 2nd generation cephalosporin antibiotic to begin 3 days prior to surgery and will end 7 days after surgery.     You will be prescribed Intranasal Bactoban/Mupirocin daily for 7 days prior to surgery to end 7 days postop.    Wash chest and neck daily for 3 days prior to surgery with Chloraprep

## 2024-10-03 ENCOUNTER — OFFICE VISIT (OUTPATIENT)
Dept: OBGYN CLINIC | Age: 59
End: 2024-10-03

## 2024-10-03 VITALS
HEART RATE: 71 BPM | HEIGHT: 67 IN | DIASTOLIC BLOOD PRESSURE: 89 MMHG | BODY MASS INDEX: 36.1 KG/M2 | WEIGHT: 230 LBS | SYSTOLIC BLOOD PRESSURE: 131 MMHG

## 2024-10-03 DIAGNOSIS — Z12.39 SCREENING BREAST EXAMINATION: ICD-10-CM

## 2024-10-03 DIAGNOSIS — B96.89 BACTERIAL VAGINITIS: ICD-10-CM

## 2024-10-03 DIAGNOSIS — Z12.4 CERVICAL CANCER SCREENING: ICD-10-CM

## 2024-10-03 DIAGNOSIS — N76.0 BACTERIAL VAGINITIS: ICD-10-CM

## 2024-10-03 DIAGNOSIS — Z11.51 ENCOUNTER FOR SCREENING FOR HUMAN PAPILLOMAVIRUS (HPV): ICD-10-CM

## 2024-10-03 DIAGNOSIS — Z01.419 ENCOUNTER FOR WELL WOMAN EXAM WITH ROUTINE GYNECOLOGICAL EXAM: Primary | ICD-10-CM

## 2024-10-03 LAB — HPV16+18+H RISK 12 DNA SPEC-IMP: NORMAL

## 2024-10-03 RX ORDER — CYANOCOBALAMIN 1000 UG/ML
1000 INJECTION, SOLUTION INTRAMUSCULAR; SUBCUTANEOUS ONCE
COMMUNITY

## 2024-10-03 ASSESSMENT — ENCOUNTER SYMPTOMS
RESPIRATORY NEGATIVE: 1
GASTROINTESTINAL NEGATIVE: 1
BACK PAIN: 0
CHEST TIGHTNESS: 0
RECTAL PAIN: 0
DIARRHEA: 0
ABDOMINAL PAIN: 0
CONSTIPATION: 0
SHORTNESS OF BREATH: 0
NAUSEA: 0

## 2024-10-03 NOTE — PROGRESS NOTES
Pt presents today for pap smear and breast exam.     Mammo: 2024  Pap smear: 2023  Contraception: postmeno    P: 2  Ab: 0  Bone density: never  Colonoscopy:

## 2024-10-03 NOTE — PROGRESS NOTES
[facility]  CNM Office Note    Zaira Webster is a 58 y.o. female who presents today for her medical conditions/ complaints as noted below.  Chief Complaint   Patient presents with    Annual Exam     Mammo: 2024  Pap smear: 2023  Contraception: postmeno    P: 2  Ab: 0  Bone density: never  Colonoscopy:     HPI  Zaira Webster presents today for annual exam. She is due for pap smear and mammogram. She reports history of abnormal pap smears (ASC-H, HPV+ other last year - colposcopy was normal) and denies history of abnormal mammograms. She denies family history of breast cancer. She is postmenopausal. She denies issues with bladder, bowel, or intercourse.  She feels her sleep pattern and quality is Poor. She is scheduled for a nerve stimulator to be implanted soon and hopes that will help. She is still experiencing recurrent bacterial vaginitis infections.     Problems/Complaints today:  1. Encounter for well woman exam with routine gynecological exam  2. Cervical cancer screening  -     PAP SMEAR  3. Encounter for screening for human papillomavirus (HPV)  -     Human papillomavirus (HPV) DNA probe thin prep high risk  4. Screening breast examination  5. Bacterial vaginitis  -     Miscellaneous sendout 2       Patient Active Problem List   Diagnosis    Mitral valve prolapse    NAFLD (nonalcoholic fatty liver disease)    Family history of esophageal cancer    Vitamin D deficiency    Obstructive sleep apnea    Sleep disturbance    Restless leg syndrome    Somnolence, daytime    Difficulty using continuous positive airway pressure (CPAP) device       Patient's last menstrual period was 2020.      Past Medical History:   Diagnosis Date    ASCUS of cervix with negative high risk HPV 2017    BV (bacterial vaginosis)     Diabetes (HCC)     Difficulty using continuous positive airway pressure (CPAP) device     HPV (human papilloma virus) infection     Hypertension     Mitral

## 2024-10-08 ENCOUNTER — ANESTHESIA EVENT (OUTPATIENT)
Dept: PERIOP | Facility: HOSPITAL | Age: 59
End: 2024-10-08
Payer: COMMERCIAL

## 2024-10-08 ENCOUNTER — TELEPHONE (OUTPATIENT)
Dept: OTOLARYNGOLOGY | Facility: CLINIC | Age: 59
End: 2024-10-08

## 2024-10-08 ENCOUNTER — ANESTHESIA (OUTPATIENT)
Dept: PERIOP | Facility: HOSPITAL | Age: 59
End: 2024-10-08
Payer: COMMERCIAL

## 2024-10-08 ENCOUNTER — HOSPITAL ENCOUNTER (OUTPATIENT)
Facility: HOSPITAL | Age: 59
Setting detail: HOSPITAL OUTPATIENT SURGERY
Discharge: HOME | End: 2024-10-08
Attending: OTOLARYNGOLOGY | Admitting: OTOLARYNGOLOGY
Payer: COMMERCIAL

## 2024-10-08 VITALS
OXYGEN SATURATION: 94 % | RESPIRATION RATE: 16 BRPM | HEART RATE: 65 BPM | SYSTOLIC BLOOD PRESSURE: 151 MMHG | DIASTOLIC BLOOD PRESSURE: 85 MMHG | TEMPERATURE: 98.2 F

## 2024-10-08 DIAGNOSIS — G47.33 OSA (OBSTRUCTIVE SLEEP APNEA): Primary | ICD-10-CM

## 2024-10-08 LAB
GLUCOSE BLDC GLUCOMTR-MCNC: 101 MG/DL (ref 70–130)
GLUCOSE BLDC GLUCOMTR-MCNC: 111 MG/DL (ref 70–130)
HPV HR 12 DNA SPEC QL NAA+PROBE: DETECTED
HPV16 DNA SPEC QL NAA+PROBE: NOT DETECTED
HPV16+18+H RISK 12 DNA SPEC-IMP: ABNORMAL
HPV18 DNA SPEC QL NAA+PROBE: NOT DETECTED

## 2024-10-08 PROCEDURE — 25010000002 ONDANSETRON PER 1 MG

## 2024-10-08 PROCEDURE — 25010000002 PROPOFOL 10 MG/ML EMULSION

## 2024-10-08 PROCEDURE — 25010000002 CEFAZOLIN PER 500 MG

## 2024-10-08 PROCEDURE — C1787 PATIENT PROGR, NEUROSTIM: HCPCS | Performed by: OTOLARYNGOLOGY

## 2024-10-08 PROCEDURE — C1778 LEAD, NEUROSTIMULATOR: HCPCS | Performed by: OTOLARYNGOLOGY

## 2024-10-08 PROCEDURE — 25810000003 LACTATED RINGERS PER 1000 ML: Performed by: OTOLARYNGOLOGY

## 2024-10-08 PROCEDURE — 82948 REAGENT STRIP/BLOOD GLUCOSE: CPT

## 2024-10-08 PROCEDURE — C1767 GENERATOR, NEURO NON-RECHARG: HCPCS | Performed by: OTOLARYNGOLOGY

## 2024-10-08 PROCEDURE — 25010000002 LIDOCAINE 1% - EPINEPHRINE 1:100000 1 %-1:100000 SOLUTION: Performed by: OTOLARYNGOLOGY

## 2024-10-08 PROCEDURE — 25010000002 LIDOCAINE PF 2% 2 % SOLUTION

## 2024-10-08 PROCEDURE — 25010000002 HYDROMORPHONE 1 MG/ML SOLUTION

## 2024-10-08 PROCEDURE — 64582 OPN MPLTJ HPGLSL NSTM ARY PG: CPT | Performed by: OTOLARYNGOLOGY

## 2024-10-08 PROCEDURE — 25010000002 FENTANYL CITRATE (PF) 100 MCG/2ML SOLUTION

## 2024-10-08 DEVICE — LD SENSR IPG INSPIRE RESP 45CM 3.6MM: Type: IMPLANTABLE DEVICE | Site: CHEST | Status: FUNCTIONAL

## 2024-10-08 DEVICE — LD STIM IPG INSPIRE 3/ELECTRD 45CM: Type: IMPLANTABLE DEVICE | Site: NECK | Status: FUNCTIONAL

## 2024-10-08 DEVICE — GEN IPG INSPIRE4 RESP/SENSR/LD NONRECHG: Type: IMPLANTABLE DEVICE | Site: CHEST | Status: FUNCTIONAL

## 2024-10-08 RX ORDER — MUPIROCIN 20 MG/G
1 OINTMENT TOPICAL TAKE AS DIRECTED
COMMUNITY

## 2024-10-08 RX ORDER — DROPERIDOL 2.5 MG/ML
0.62 INJECTION, SOLUTION INTRAMUSCULAR; INTRAVENOUS ONCE AS NEEDED
Status: DISCONTINUED | OUTPATIENT
Start: 2024-10-08 | End: 2024-10-08 | Stop reason: HOSPADM

## 2024-10-08 RX ORDER — NALOXONE HCL 0.4 MG/ML
0.4 VIAL (ML) INJECTION AS NEEDED
Status: DISCONTINUED | OUTPATIENT
Start: 2024-10-08 | End: 2024-10-08 | Stop reason: HOSPADM

## 2024-10-08 RX ORDER — SODIUM CHLORIDE, SODIUM LACTATE, POTASSIUM CHLORIDE, CALCIUM CHLORIDE 600; 310; 30; 20 MG/100ML; MG/100ML; MG/100ML; MG/100ML
1000 INJECTION, SOLUTION INTRAVENOUS CONTINUOUS
Status: DISCONTINUED | OUTPATIENT
Start: 2024-10-08 | End: 2024-10-08 | Stop reason: HOSPADM

## 2024-10-08 RX ORDER — IBUPROFEN 600 MG/1
600 TABLET, FILM COATED ORAL EVERY 6 HOURS PRN
Status: DISCONTINUED | OUTPATIENT
Start: 2024-10-08 | End: 2024-10-08 | Stop reason: HOSPADM

## 2024-10-08 RX ORDER — ONDANSETRON 2 MG/ML
4 INJECTION INTRAMUSCULAR; INTRAVENOUS ONCE AS NEEDED
Status: DISCONTINUED | OUTPATIENT
Start: 2024-10-08 | End: 2024-10-08 | Stop reason: HOSPADM

## 2024-10-08 RX ORDER — MIDAZOLAM HYDROCHLORIDE 2 MG/2ML
1 INJECTION, SOLUTION INTRAMUSCULAR; INTRAVENOUS
Status: DISCONTINUED | OUTPATIENT
Start: 2024-10-08 | End: 2024-10-08 | Stop reason: HOSPADM

## 2024-10-08 RX ORDER — HYDROCODONE BITARTRATE AND ACETAMINOPHEN 5; 325 MG/1; MG/1
1 TABLET ORAL EVERY 4 HOURS PRN
Status: DISCONTINUED | OUTPATIENT
Start: 2024-10-08 | End: 2024-10-08 | Stop reason: HOSPADM

## 2024-10-08 RX ORDER — CEFAZOLIN SODIUM 1 G/3ML
INJECTION, POWDER, FOR SOLUTION INTRAMUSCULAR; INTRAVENOUS AS NEEDED
Status: DISCONTINUED | OUTPATIENT
Start: 2024-10-08 | End: 2024-10-08 | Stop reason: SURG

## 2024-10-08 RX ORDER — MUPIROCIN 20 MG/G
OINTMENT TOPICAL EVERY 8 HOURS SCHEDULED
Status: DISCONTINUED | OUTPATIENT
Start: 2024-10-08 | End: 2024-10-08 | Stop reason: HOSPADM

## 2024-10-08 RX ORDER — PROPOFOL 10 MG/ML
VIAL (ML) INTRAVENOUS AS NEEDED
Status: DISCONTINUED | OUTPATIENT
Start: 2024-10-08 | End: 2024-10-08 | Stop reason: SURG

## 2024-10-08 RX ORDER — SCOLOPAMINE TRANSDERMAL SYSTEM 1 MG/1
1 PATCH, EXTENDED RELEASE TRANSDERMAL ONCE
Status: DISCONTINUED | OUTPATIENT
Start: 2024-10-08 | End: 2024-10-08 | Stop reason: HOSPADM

## 2024-10-08 RX ORDER — LIDOCAINE HYDROCHLORIDE AND EPINEPHRINE 10; 10 MG/ML; UG/ML
INJECTION, SOLUTION INFILTRATION; PERINEURAL AS NEEDED
Status: DISCONTINUED | OUTPATIENT
Start: 2024-10-08 | End: 2024-10-08 | Stop reason: HOSPADM

## 2024-10-08 RX ORDER — HYDROCODONE BITARTRATE AND ACETAMINOPHEN 10; 325 MG/1; MG/1
1 TABLET ORAL EVERY 4 HOURS PRN
Status: DISCONTINUED | OUTPATIENT
Start: 2024-10-08 | End: 2024-10-08 | Stop reason: HOSPADM

## 2024-10-08 RX ORDER — LIDOCAINE HYDROCHLORIDE 10 MG/ML
0.5 INJECTION, SOLUTION EPIDURAL; INFILTRATION; INTRACAUDAL; PERINEURAL ONCE AS NEEDED
Status: DISCONTINUED | OUTPATIENT
Start: 2024-10-08 | End: 2024-10-08 | Stop reason: HOSPADM

## 2024-10-08 RX ORDER — FENTANYL CITRATE 50 UG/ML
INJECTION, SOLUTION INTRAMUSCULAR; INTRAVENOUS AS NEEDED
Status: DISCONTINUED | OUTPATIENT
Start: 2024-10-08 | End: 2024-10-08 | Stop reason: SURG

## 2024-10-08 RX ORDER — SODIUM CHLORIDE 0.9 % (FLUSH) 0.9 %
3-10 SYRINGE (ML) INJECTION AS NEEDED
Status: DISCONTINUED | OUTPATIENT
Start: 2024-10-08 | End: 2024-10-08 | Stop reason: HOSPADM

## 2024-10-08 RX ORDER — SUCCINYLCHOLINE/SOD CL,ISO/PF 200MG/10ML
SYRINGE (ML) INTRAVENOUS AS NEEDED
Status: DISCONTINUED | OUTPATIENT
Start: 2024-10-08 | End: 2024-10-08 | Stop reason: SURG

## 2024-10-08 RX ORDER — HYDROCODONE BITARTRATE AND ACETAMINOPHEN 5; 325 MG/1; MG/1
1 TABLET ORAL ONCE AS NEEDED
Status: DISCONTINUED | OUTPATIENT
Start: 2024-10-08 | End: 2024-10-08 | Stop reason: HOSPADM

## 2024-10-08 RX ORDER — FLUMAZENIL 0.1 MG/ML
0.2 INJECTION INTRAVENOUS AS NEEDED
Status: DISCONTINUED | OUTPATIENT
Start: 2024-10-08 | End: 2024-10-08 | Stop reason: HOSPADM

## 2024-10-08 RX ORDER — SODIUM CHLORIDE 0.9 % (FLUSH) 0.9 %
3 SYRINGE (ML) INJECTION EVERY 12 HOURS SCHEDULED
Status: DISCONTINUED | OUTPATIENT
Start: 2024-10-08 | End: 2024-10-08 | Stop reason: HOSPADM

## 2024-10-08 RX ORDER — LABETALOL HYDROCHLORIDE 5 MG/ML
5 INJECTION, SOLUTION INTRAVENOUS
Status: DISCONTINUED | OUTPATIENT
Start: 2024-10-08 | End: 2024-10-08 | Stop reason: HOSPADM

## 2024-10-08 RX ORDER — ROCURONIUM BROMIDE 10 MG/ML
INJECTION, SOLUTION INTRAVENOUS AS NEEDED
Status: DISCONTINUED | OUTPATIENT
Start: 2024-10-08 | End: 2024-10-08 | Stop reason: SURG

## 2024-10-08 RX ORDER — LIDOCAINE HYDROCHLORIDE 40 MG/ML
SOLUTION TOPICAL AS NEEDED
Status: DISCONTINUED | OUTPATIENT
Start: 2024-10-08 | End: 2024-10-08 | Stop reason: SURG

## 2024-10-08 RX ORDER — LIDOCAINE HYDROCHLORIDE 20 MG/ML
INJECTION, SOLUTION EPIDURAL; INFILTRATION; INTRACAUDAL; PERINEURAL AS NEEDED
Status: DISCONTINUED | OUTPATIENT
Start: 2024-10-08 | End: 2024-10-08 | Stop reason: SURG

## 2024-10-08 RX ORDER — SODIUM CHLORIDE 0.9 % (FLUSH) 0.9 %
3 SYRINGE (ML) INJECTION AS NEEDED
Status: DISCONTINUED | OUTPATIENT
Start: 2024-10-08 | End: 2024-10-08 | Stop reason: HOSPADM

## 2024-10-08 RX ORDER — ONDANSETRON 4 MG/1
4 TABLET, ORALLY DISINTEGRATING ORAL ONCE AS NEEDED
Status: DISCONTINUED | OUTPATIENT
Start: 2024-10-08 | End: 2024-10-08 | Stop reason: HOSPADM

## 2024-10-08 RX ORDER — ACETAMINOPHEN 500 MG
1000 TABLET ORAL ONCE
Status: COMPLETED | OUTPATIENT
Start: 2024-10-08 | End: 2024-10-08

## 2024-10-08 RX ORDER — FENTANYL CITRATE 50 UG/ML
50 INJECTION, SOLUTION INTRAMUSCULAR; INTRAVENOUS
Status: DISCONTINUED | OUTPATIENT
Start: 2024-10-08 | End: 2024-10-08 | Stop reason: HOSPADM

## 2024-10-08 RX ORDER — ONDANSETRON 2 MG/ML
INJECTION INTRAMUSCULAR; INTRAVENOUS AS NEEDED
Status: DISCONTINUED | OUTPATIENT
Start: 2024-10-08 | End: 2024-10-08 | Stop reason: SURG

## 2024-10-08 RX ORDER — CEFUROXIME AXETIL 500 MG/1
500 TABLET ORAL TAKE AS DIRECTED
COMMUNITY

## 2024-10-08 RX ORDER — HYDROCODONE BITARTRATE AND ACETAMINOPHEN 5; 325 MG/1; MG/1
1 TABLET ORAL EVERY 8 HOURS PRN
Qty: 8 TABLET | Refills: 0 | Status: SHIPPED | OUTPATIENT
Start: 2024-10-08

## 2024-10-08 RX ORDER — MUPIROCIN 20 MG/G
OINTMENT TOPICAL AS NEEDED
Status: DISCONTINUED | OUTPATIENT
Start: 2024-10-08 | End: 2024-10-08 | Stop reason: HOSPADM

## 2024-10-08 RX ORDER — MAGNESIUM HYDROXIDE 1200 MG/15ML
LIQUID ORAL AS NEEDED
Status: DISCONTINUED | OUTPATIENT
Start: 2024-10-08 | End: 2024-10-08 | Stop reason: HOSPADM

## 2024-10-08 RX ADMIN — CEFAZOLIN 2 G: 1 INJECTION, POWDER, FOR SOLUTION INTRAMUSCULAR; INTRAVENOUS at 10:07

## 2024-10-08 RX ADMIN — Medication 120 MG: at 10:00

## 2024-10-08 RX ADMIN — FENTANYL CITRATE 100 MCG: 50 INJECTION, SOLUTION INTRAMUSCULAR; INTRAVENOUS at 10:00

## 2024-10-08 RX ADMIN — ROCURONIUM BROMIDE 10 MG: 10 INJECTION, SOLUTION INTRAVENOUS at 10:00

## 2024-10-08 RX ADMIN — SODIUM CHLORIDE, POTASSIUM CHLORIDE, SODIUM LACTATE AND CALCIUM CHLORIDE 1000 ML: 600; 310; 30; 20 INJECTION, SOLUTION INTRAVENOUS at 06:53

## 2024-10-08 RX ADMIN — LIDOCAINE HYDROCHLORIDE 1 EACH: 40 SOLUTION TOPICAL at 10:00

## 2024-10-08 RX ADMIN — ONDANSETRON 4 MG: 2 INJECTION INTRAMUSCULAR; INTRAVENOUS at 11:59

## 2024-10-08 RX ADMIN — LIDOCAINE HYDROCHLORIDE 100 MG: 20 INJECTION, SOLUTION EPIDURAL; INFILTRATION; INTRACAUDAL; PERINEURAL at 10:00

## 2024-10-08 RX ADMIN — ACETAMINOPHEN 1000 MG: 500 TABLET, FILM COATED ORAL at 08:52

## 2024-10-08 RX ADMIN — SCOPALAMINE 1 PATCH: 1 PATCH, EXTENDED RELEASE TRANSDERMAL at 08:52

## 2024-10-08 RX ADMIN — PROPOFOL 170 MG: 10 INJECTION, EMULSION INTRAVENOUS at 10:00

## 2024-10-08 RX ADMIN — HYDROMORPHONE HYDROCHLORIDE 1 MG: 1 INJECTION, SOLUTION INTRAMUSCULAR; INTRAVENOUS; SUBCUTANEOUS at 11:55

## 2024-10-08 NOTE — ANESTHESIA POSTPROCEDURE EVALUATION
Patient: Suasn Sigala    Procedure Summary       Date: 10/08/24 Room / Location:  [facility] OR  /  [facility]    Anesthesia Start: 0957 Anesthesia Stop: 1216    Procedure: HYPOGLOSSAL NERVE STIMULATION DEVICE IMPLANT (Neck) Diagnosis:       KEYSHA (obstructive sleep apnea)      Other fatigue      Snoring      Intolerance of continuous positive airway pressure (CPAP) ventilation      (KEYSHA (obstructive sleep apnea) [G47.33])      (Other fatigue [R53.83])      (Snoring [R06.83])      (Intolerance of continuous positive airway pressure (CPAP) ventilation [Z78.9])    Surgeons: Jeramy Corona MD Provider: Julio Edmond CRNA    Anesthesia Type: general ASA Status: 2            Anesthesia Type: general    Vitals  Vitals Value Taken Time   /88 10/08/24 1300   Temp 98.2 °F (36.8 °C) 10/08/24 1215   Pulse 71 10/08/24 1303   Resp 16 10/08/24 1300   SpO2 93 % 10/08/24 1302   Vitals shown include unfiled device data.        Post Anesthesia Care and Evaluation    Patient location during evaluation: PACU  Patient participation: complete - patient participated  Level of consciousness: awake and alert  Pain management: adequate    Airway patency: patent  Anesthetic complications: No anesthetic complications    Cardiovascular status: acceptable  Respiratory status: acceptable  Hydration status: acceptable    Comments: Blood pressure 151/85, pulse 65, temperature 98.2 °F (36.8 °C), temperature source Temporal, resp. rate 16, SpO2 94%, not currently breastfeeding.    Pt discharged from PACU based on bandar score >8

## 2024-10-08 NOTE — TELEPHONE ENCOUNTER
[facility] received a fax that requires your attention. The document has been indexed to the patient’s chart for your review.      Reason for sending: PLEASE REVIEW RX NOTIFICATION    Documents Description: RX BACKORDERED/UNAVAILABLE (HYDROCODONE-ACETAMIN 5-325 MG)    Name of Sender: [facility]    Date Indexed: 10.8.24    Notes (if needed):

## 2024-10-08 NOTE — ANESTHESIA PREPROCEDURE EVALUATION
Anesthesia Evaluation     Patient summary reviewed   history of anesthetic complications:  prolonged sedation  NPO Solid Status: > 8 hours             Airway   Mallampati: II  Dental      Pulmonary    (+) ,sleep apnea on CPAP  (-) COPD, asthma, not a smoker  Cardiovascular   Exercise tolerance: excellent (>7 METS)    (+) hypertension  (-) pacemaker, past MI, angina, cardiac stents      Neuro/Psych  (-) seizures, TIA, CVA  GI/Hepatic/Renal/Endo    (+) obesity, diabetes mellitus  (-) GERD, liver disease, no renal disease    Musculoskeletal     Abdominal    Substance History      OB/GYN          Other                        Anesthesia Plan    ASA 2     general     intravenous induction     Anesthetic plan, risks, benefits, and alternatives have been provided, discussed and informed consent has been obtained with: patient.      CODE STATUS:

## 2024-10-08 NOTE — DISCHARGE INSTRUCTIONS
Jeramy Corona MD, FACS  Inspire Post-Operative Instructions   DIET:  Resume normal diet  HYGIENE:  Please wait until 48 hours after surgery before getting incisions on neck, chest, and torso wet.  In the first 48 hours after surgery, will likely need to take sponge baths.  WOUND CARE:  Please leave pressure dressing on for 48 hours after surgery.  Gently place antibiotic ointment over incisions 2 times per day; use clean Q-tip.  May place a clean bandage over incisions as needed.  After 48 hours, you may get incisions wet with warm soap and water, but do not soak the incisions.  Pat area dry gently.  Immediately place antibiotic ointment.  Take oral antibiotics as prescribed  If skin around incision starts to get red (> 1cm), swollen, and/or more painful, please call the office  ACTIVITY:  Try to avoid sleeping on the side of your surgery, to the extent possible.    You may walk for exercise starting the day after surgery.  For 2 weeks:  Do not  anything greater than 5 pounds with the hand/arm that's on the same side as the surgery.  After 2 weeks, you may increase weight to 10 pounds.    Consider performing neck rolls 10 clockwise and 10 counterclockwise 3x/day.  For 4 weeks, no strenuous activity (running, jogging, lifting weights, gardening, sports) or until cleared by physician.    PAIN MEDICATIONS:  You will be prescribed medication for pain unless you already have a prescription for pain medication.    If pain is not severe, consider taking Tylenol 650mg every 6 hours (account for the Tylenol in your narcotic pain medication if you are using both so as not to overdose on Tylenol.)  Avoid aspirin for 7 days after surgery  Avoid direct heat (such as heating pads) to incision sites.    May gently place ice over surgery sites as needed.  Please place a thin clean towel over skin first and then place ice bag over towel.  Ice for 10 minutes at a time only.  POST-OPERATIVE CLINIC APPOINTMENTS:  1 month:  device activation with your primary sleep doctor and wound check in the office around the same time.  3-4 months: titration sleep study based on usage of >4 hrs/night.   Yearly: device check at the primary sleep doctor's office.   SCAR CARE:  After incisions have healed, you will have a scar, which will continue to evolve over the course of 12 months.  Caring for your incision scars will help them to be as minimal as possible.  If you are out in the sun with incision exposure, please remember to place sunscreen over the incision and surrounding skin.    You may use vitamin E or “Scar ointment/cream” to help soften scar.  Please wait one month after surgery before starting this.      [phone]  Janel Chairez RN, (Use this number to leave a message for a call back or a question)  [phone]  This is the “after hours” emergency number that will allow you to speak to the On-call provider

## 2024-10-08 NOTE — OP NOTE
OPERATIVE NOTE  10/8/2024    NAME: Susan Sigala    YOB: 1965  MRN: [id number]    PRE-OPERATIVE DIAGNOSIS:    KEYSHA (obstructive sleep apnea) [G47.33]  Other fatigue [R53.83]  Snoring [R06.83]  Intolerance of continuous positive airway pressure (CPAP) ventilation [Z78.9]    POST-OPERATIVE DIAGNOSIS:   Post-Op Diagnosis Codes:     * KEYSHA (obstructive sleep apnea) [G47.33]     * Other fatigue [R53.83]     * Snoring [R06.83]     * Intolerance of continuous positive airway pressure (CPAP) ventilation [Z78.9]    PROCEDURE PERFORMED:   12th cranial nerve (hypoglossal) stimulation implant with placement of chest wall respiratory sensor (CPT 58718).    SURGEON:   Jeramy Corona MD    ASSISTANT(S):   None    ANESTHESIA:   General Anesthesia via Endotracheal Tube    INDICATIONS: The patient is a 58 y.o. female with KEYSHA (obstructive sleep apnea) [G47.33]  Other fatigue [R53.83]  Snoring [R06.83]  Intolerance of continuous positive airway pressure (CPAP) ventilation [Z78.9]    PROCEDURE:  The patient was brought to the operating room, given General Anesthesia via Endotracheal Tube, and prepped and draped in the usual manner.     Prior to prepping and draping, electrodes were placed in the genioglossus and hyoglossus muscle and connected to the NIM box for intraoperative nerve monitoring.  The patient was subsequently prepped and draped in the usual fashion.    A modified sub-mandibular incision was made in the right upper neck approximately 2 cm below the mandible. Dissection was carried down through the subcutaneous tissue and platysma. The anterior/inferior border of the submandibular gland was identified as well as the digastric tendon. The submandibular gland and the overlying fascia with the marginal mandibular nerve were retracted posteriorly. The digastric tendon was retracted inferiorly. Dissection continued down into the digastric triangle and the posterior border of the mylohyoid muscle was freed up  and retracted anteriorly. With balanced retraction, the hypoglossal nerve was identified in its usual fashion and was dissected up towards the floor of the mouth. The superior/posterior branches innervating the hyoglossus muscle were identified using the NIM stimulator and anatomical cues. The cuff electrode for the hypoglossal nerve stimulator was placed distally to these branches innervating genioglossus, transverse, and vertical muscles. The stimulation lead was anchored to the digastric tendon using two 3.0 silk sutures and lead body slack between the cuff and the anchor gently tucked deep to the submandibular gland.    A second 5 cm incision was made in the right upper chest over the second intercostal space, approximately 3cm lateral to the sternal margin. Dissection was carried down through the skin and subcutaneous tissue to the fascia of the pectoralis muscle. An inferior pocket for the generator was created deep to the subcutaneous layer and superficial to the fascia of the pectoralis muscle. The pectoralis major fascia was dissected directly over the second intercostal space with subsequent blunt dissection through the muscle. The pectoralis major/minor was then retracted to expose the fatty layer just superficial to the external intercostals. The fatty layer was carefully swept away to expose the external intercostal muscles. A throw-down base knot was placed to the fascia of the external intercostals just lateral to the anterior external membrane using 3.0 silk suture. A fasciotomy through the external intercostals was performed approximately 5 mm lateral to the suture knot and the respiratory sense lead (; ) was advanced with the sensor facing the pleura into the interfascial plane between the external and internal intercostals. The primary anchor was sutured into place with 3.0 silk on the external intercostals. The secondary anchor was sutured with 3.0 silk to the pectoralis major allowing  adequate slack between the anchors.    The stimulation lead was then tunneled in a subplatysmal plane with blunt dissection under direct visualization and brought out into the sub-clavicular pocket where both the stimulation lead and the respiratory sensing lead were connected to the implantable pulse generator, using the two person, three-handed approach.    The implantable pulse generator was placed in the subclavicular pocket ensuring lead body was deep to the generator and secured with use of air knots to the pectoralis fascia using 2.0 silk sutures.  Diagnostic evaluation at first demonstrated some exclusion branches still present in the cuff.  Attention was turned back to the submandibular incision where the cuff was removed the exclusion branches were identified somewhat superior to the nerve and excluded from placement of the cuff subsequently.  Subsequent diagnostic evaluation confirmed good placement of the stimulation cuff as demonstrated by activation of the genioglossus and transverse and vertical muscles, resulting in unhindered, stiffened tongue protrusion, confirmed visually. Diagnostic evaluation also confirmed good respiratory sensor placement as demonstrated by a sensing waveform with good rise and fall associated with patient respirations.    All the wounds were thoroughly irrigated and closed in three layers with deep 4-0 Monocryl in a running subcuticular stitch of 4-0 Monocryl to reapproximate the epidermis. Mastisol and Steri-Strips were applied followed by Bactroban ointment and sterile pressure dressings consisting of 4 x 4's and Tegaderm.     The patient tolerated the procedure well and was transported upon extubation to the postanesthesia care unit in stable condition.    SPECIMENS:  None    COMPLICATIONS: NONE    ESTIMATED BLOOD LOSS:  < 25 cc    Jeramy Corona MD  10/8/2024

## 2024-10-08 NOTE — ANESTHESIA PROCEDURE NOTES
Airway  Urgency: elective    Date/Time: 10/8/2024 10:00 AM  Airway not difficult    General Information and Staff    Patient location during procedure: OR  CRNA/CAA: Julio Edmond CRNA    Indications and Patient Condition  Indications for airway management: airway protection    Preoxygenated: yes  Mask difficulty assessment: 1 - vent by mask    Final Airway Details  Final airway type: endotracheal airway      Successful airway: ETT  Cuffed: yes   Successful intubation technique: direct laryngoscopy  Blade: Ortega  Blade size: 2  ETT size (mm): 7.5  Cormack-Lehane Classification: grade I - full view of glottis  Placement verified by: chest auscultation and capnometry   Measured from: lips  ETT/EBT  to lips (cm): 21  Number of attempts at approach: 1  Assessment: lips, teeth, and gum same as pre-op and atraumatic intubation

## 2024-10-08 NOTE — H&P
YOB: 1965  Location: [facility]  Location Address: [address] [place] [address], [facility], [address] [place] [address]  Location Phone: [phone]         Chief Complaint   Patient presents with    post op VSE         History of Present Illness  Susan Sigala is a 58 y.o. female.  Susan Sigala is here for follow up of ENT complaints. The patient has had problems with sleep apnea, snoring, fatigue and poor CPAP tolerance.  She has had a video sleep endoscopy that reveals that she is a candidate for the inspire. She would like to proceed with scheduling.     Susan Sigala was first diagnosed with sleep apnea 5+ years ago   She has tried using cpap with several different masks/settings as well as a nasal pillow for 5 years.   Currently patient is wearing cpap 8 hours per night.      AHI: 33.3 on 2023  BMI: 34.49  [place]: 13     Medical History        Past Medical History:   Diagnosis Date    Anxiety      Diabetes mellitus      Hypertension      KEYSHA (obstructive sleep apnea)       CPAP            Surgical History         Past Surgical History:   Procedure Laterality Date     SECTION         x1    COLONOSCOPY        ENDOSCOPY        EYE SURGERY   many years ago     lasik    LASIK        SLEEP ENDOSCOPY N/A 2024     Procedure: Videosleep endoscopy;  Surgeon: Jeramy Corona MD;  Location: [facility] OR;  Service: ENT;  Laterality: N/A;            Medications Taking          Outpatient Medications Marked as Taking for the 9/3/24 encounter (Office Visit) with Jeramy Corona MD   Medication Sig Dispense Refill    citalopram (CeleXA) 10 MG tablet Take 1 tablet by mouth Daily.        lisinopril (PRINIVIL,ZESTRIL) 10 MG tablet Take 1 tablet by mouth Daily.        Semaglutide,0.25 or 0.5MG/DOS, (Ozempic, 0.25 or 0.5 MG/DOSE,) 2 MG/1.5ML solution pen-injector Inject  under the skin into the appropriate area as directed 1 (One) Time Per Week.        thiamine (VITAMIN B-1) 100 MG tablet  tablet Take  1 tablet by mouth Daily.        vitamin D (ERGOCALCIFEROL) 1.25 MG (41183 UT) capsule capsule Take 1 capsule by mouth Every 7 (Seven) Days.                Amoxicillin           Family History   Problem Relation Age of Onset    Hypertension Mother           Social History   Social History            Socioeconomic History    Marital status:    Tobacco Use    Smoking status: Never    Smokeless tobacco: Never   Vaping Use    Vaping status: Never Used   Substance and Sexual Activity    Alcohol use: Yes       Alcohol/week: 2.0 standard drinks of alcohol       Types: 1 Glasses of wine, 1 Cans of beer per week    Drug use: Never    Sexual activity: Never            Review of Systems   Constitutional:  Positive for fatigue.   HENT: Negative.     Respiratory:  Positive for apnea.              Vitals:     09/03/24 1132   BP: 145/87   Pulse: 76   Temp: 98 °F (36.7 °C)         Body mass index is 34.49 kg/m².        Objective  Physical Exam  Vitals reviewed.   Constitutional:       Appearance: Normal appearance. She is obese.   HENT:      Head: Normocephalic.      Right Ear: Tympanic membrane, ear canal and external ear normal.      Left Ear: Tympanic membrane, ear canal and external ear normal.      Nose: Nose normal.      Mouth/Throat:      Lips: Pink.      Mouth: Mucous membranes are moist.      Pharynx: Oropharynx is clear.      Tonsils: 2+ on the right. 2+ on the left.      Comments: [place] II/III  Musculoskeletal:      Cervical back: Full passive range of motion without pain.   Neurological:      Mental Status: She is alert.   Psychiatric:         Behavior: Behavior is cooperative.                  Assessment & Plan  Diagnoses and all orders for this visit:     1. KEYSHA (obstructive sleep apnea) (Primary)  -     Case Request; Standing  -     Case Request     2. Other fatigue  -     Case Request; Standing  -     Case Request     3. Snoring  -     Case Request; Standing  -     Case Request     4. Intolerance of  continuous positive airway pressure (CPAP) ventilation  -     Case Request; Standing  -     Case Request     Other orders  -     cefuroxime (CEFTIN) 500 MG tablet; Take 1 tablet by mouth 2 (Two) Times a Day for 10 days.  Dispense: 20 tablet; Refill: 0  -     Chlorhexidine Gluconate 4 % solution; Apply 1 Application topically to the appropriate area as directed Daily.  Dispense: 473 mL; Refill: 0  -     mupirocin (BACTROBAN) 2 % nasal ointment; 1 Application into the nostril(s) as directed by provider 2 (Two) Times a Day for 14 days.  Dispense: 28 g; Refill: 0  -     Follow Anesthesia Guidelines / Protocol; Future  -     Provide Patient With Instructions on NPO Status  -     Follow Anesthesia Guidelines / Protocol; Standing  -     Verify NPO Status; Standing  -     Obtain Informed Consent; Standing  -     SCD (Sequential Compression Device) - To Be Placed on Patient in Pre-Op; Standing  -     Patient to Void Prior to Transfer to OR; Standing  -     Instructions for Nursing; Standing        HYPOGLOSSAL NERVE STIMULATION DEVICE IMPLANT (N/A)      Orders Placed This Encounter   Procedures    Provide Patient With Instructions on NPO Status      Shave facial hair 1-3 days prior to surgery. You may have mustache or sideburns, but no facial hair from bottom lip down.   Shave chest hair 3 days prior to surgery. Shave across midline, from clavicle (collarbone) to below the right nipple.   Begin antibiotics 3 days prior to procedure   Wash chest and neck daily for 3 days prior to surgery with chloraprep or hibiclense (can find at local pharmacy)   Intranasal bactroban daily for 7 days prior to surgery and 7 days postoperatively       The risk benefits and options of HYPOGLOSSAL NERVE STIMULATION DEVICE IMPLANT (Inspire) were discussed with the patient including the risks of bleeding, infection, the need for implant removal, malfunctioning, battery replacement and other issues were discussed at length.  Was also discussed that  the patient needed to limit movement of the chest and arm for 10 to 12 days postoperatively in order to limit development of seroma or hematoma and therefore subsequent infection.  The patient understands risk benefits and options and wishes to proceed.     Patient and family were instructed on the proper use of scheduled prescription drugs including their impact on driving and the potential effects during pregnancy.  The potential for overdose was discussed and their safe storage and proper disposal.  The website www.[employer].gov which contains other materials in this regard.       Dr. Corona examined and discussed care with patient and agrees with treatment plan.      Return for post op.           Patient Instructions   Shave facial hair 1-3 days prior to surgery. You may have mustache or sideburns, but no facial hair from bottom lip down.   Shave chest hair 3 days prior to surgery. Shave across midline, from clavicle (collarbone) to below the right nipple.   Begin antibiotics 3 days prior to procedure   Wash chest and neck daily for 3 days prior to surgery with chloraprep or hibiclense (can find at local pharmacy)   Intranasal bactroban daily for 7 days prior to surgery and 7 days postoperatively       The risk benefits and options of HYPOGLOSSAL NERVE STIMULATION DEVICE IMPLANT (Inspire) were discussed with the patient including the risks of bleeding, infection, the need for implant removal, malfunctioning, battery replacement and other issues were discussed at length.  Was also discussed that the patient needed to limit movement of the chest and arm for 10 to 12 days postoperatively in order to limit development of seroma or hematoma and therefore subsequent infection.  The patient understands risk benefits and options and wishes to proceed.     Patient and family were instructed on the proper use of scheduled prescription drugs including their impact on driving and the potential effects during pregnancy.  The  potential for overdose was discussed and their safe storage and proper disposal.  The website www.[place].gov which contains other materials in this regard.

## 2024-10-09 DIAGNOSIS — N76.0 BACTERIAL VAGINITIS: Primary | ICD-10-CM

## 2024-10-09 DIAGNOSIS — B96.89 BACTERIAL VAGINITIS: Primary | ICD-10-CM

## 2024-10-09 RX ORDER — SECNIDAZOLE 2 G/4.8G
2 GRANULE ORAL ONCE
Qty: 1 EACH | Refills: 0 | Status: SHIPPED | OUTPATIENT
Start: 2024-10-09 | End: 2024-10-09

## 2024-10-11 ENCOUNTER — TELEPHONE (OUTPATIENT)
Dept: NEUROLOGY | Age: 59
End: 2024-10-11

## 2024-10-11 NOTE — TELEPHONE ENCOUNTER
Spoke with patient, patient is aware of their Inspire Activation appointment time and date along with location

## 2024-10-17 RX ORDER — LISINOPRIL 5 MG/1
5 TABLET ORAL DAILY
Qty: 90 TABLET | Refills: 1 | Status: SHIPPED | OUTPATIENT
Start: 2024-10-17

## 2024-10-17 NOTE — TELEPHONE ENCOUNTER
Zaira Webster called to request a refill on her medication.      Last office visit : 7/11/2024   Next office visit : 11/7/2024     Requested Prescriptions     Pending Prescriptions Disp Refills    lisinopril (PRINIVIL;ZESTRIL) 5 MG tablet [Pharmacy Med Name: LISINOPRIL 5 MG TABLET] 90 tablet 1     Sig: TAKE 1 TABLET BY MOUTH EVERY DAY            Stephanie Teran MA

## 2024-11-05 DIAGNOSIS — E11.9 TYPE 2 DIABETES MELLITUS WITHOUT COMPLICATION, WITHOUT LONG-TERM CURRENT USE OF INSULIN (HCC): ICD-10-CM

## 2024-11-05 DIAGNOSIS — I10 ESSENTIAL HYPERTENSION: ICD-10-CM

## 2024-11-05 DIAGNOSIS — E53.8 B12 DEFICIENCY: ICD-10-CM

## 2024-11-05 DIAGNOSIS — E55.9 VITAMIN D DEFICIENCY: ICD-10-CM

## 2024-11-05 DIAGNOSIS — R93.3 ABNORMAL CT SCAN, SMALL BOWEL: ICD-10-CM

## 2024-11-05 DIAGNOSIS — G47.33 OSA ON CPAP: ICD-10-CM

## 2024-11-05 DIAGNOSIS — Z00.00 ANNUAL PHYSICAL EXAM: ICD-10-CM

## 2024-11-05 DIAGNOSIS — K76.0 NAFLD (NONALCOHOLIC FATTY LIVER DISEASE): ICD-10-CM

## 2024-11-05 DIAGNOSIS — F41.8 DEPRESSION WITH ANXIETY: ICD-10-CM

## 2024-11-05 LAB
ALBUMIN SERPL-MCNC: 4.4 G/DL (ref 3.5–5.2)
ALP SERPL-CCNC: 62 U/L (ref 35–104)
ALT SERPL-CCNC: 23 U/L (ref 5–33)
ANION GAP SERPL CALCULATED.3IONS-SCNC: 12 MMOL/L (ref 7–19)
AST SERPL-CCNC: 26 U/L (ref 5–32)
BILIRUB SERPL-MCNC: 0.5 MG/DL (ref 0.2–1.2)
BUN SERPL-MCNC: 11 MG/DL (ref 8–23)
CALCIUM SERPL-MCNC: 9.4 MG/DL (ref 8.8–10.2)
CHLORIDE SERPL-SCNC: 103 MMOL/L (ref 98–111)
CHOLEST SERPL-MCNC: 156 MG/DL (ref 0–199)
CO2 SERPL-SCNC: 26 MMOL/L (ref 22–29)
CREAT SERPL-MCNC: 0.8 MG/DL (ref 0.5–0.9)
GLUCOSE SERPL-MCNC: 113 MG/DL (ref 70–99)
HBA1C MFR BLD: 5.7 % (ref 4–5.6)
HDLC SERPL-MCNC: 58 MG/DL (ref 40–60)
LDLC SERPL CALC-MCNC: 76 MG/DL
POTASSIUM SERPL-SCNC: 4.2 MMOL/L (ref 3.5–5)
PROT SERPL-MCNC: 7.8 G/DL (ref 6.4–8.3)
SODIUM SERPL-SCNC: 141 MMOL/L (ref 136–145)
TRIGL SERPL-MCNC: 112 MG/DL (ref 0–149)
VIT B12 SERPL-MCNC: 703 PG/ML (ref 232–1245)

## 2024-11-07 ENCOUNTER — OFFICE VISIT (OUTPATIENT)
Dept: INTERNAL MEDICINE | Age: 59
End: 2024-11-07
Payer: COMMERCIAL

## 2024-11-07 VITALS
TEMPERATURE: 98.1 F | DIASTOLIC BLOOD PRESSURE: 78 MMHG | SYSTOLIC BLOOD PRESSURE: 126 MMHG | BODY MASS INDEX: 35.71 KG/M2 | HEART RATE: 68 BPM | WEIGHT: 228 LBS | OXYGEN SATURATION: 98 %

## 2024-11-07 DIAGNOSIS — I10 ESSENTIAL HYPERTENSION: ICD-10-CM

## 2024-11-07 DIAGNOSIS — K76.0 NAFLD (NONALCOHOLIC FATTY LIVER DISEASE): ICD-10-CM

## 2024-11-07 DIAGNOSIS — E11.9 TYPE 2 DIABETES MELLITUS WITHOUT COMPLICATION, WITHOUT LONG-TERM CURRENT USE OF INSULIN (HCC): Primary | ICD-10-CM

## 2024-11-07 DIAGNOSIS — F41.1 GENERALIZED ANXIETY DISORDER: ICD-10-CM

## 2024-11-07 DIAGNOSIS — M51.360 LUMBAR DISCOGENIC PAIN SYNDROME: ICD-10-CM

## 2024-11-07 DIAGNOSIS — E55.9 VITAMIN D DEFICIENCY: ICD-10-CM

## 2024-11-07 PROCEDURE — G8427 DOCREV CUR MEDS BY ELIG CLIN: HCPCS | Performed by: INTERNAL MEDICINE

## 2024-11-07 PROCEDURE — 2022F DILAT RTA XM EVC RTNOPTHY: CPT | Performed by: INTERNAL MEDICINE

## 2024-11-07 PROCEDURE — G8417 CALC BMI ABV UP PARAM F/U: HCPCS | Performed by: INTERNAL MEDICINE

## 2024-11-07 PROCEDURE — G8484 FLU IMMUNIZE NO ADMIN: HCPCS | Performed by: INTERNAL MEDICINE

## 2024-11-07 PROCEDURE — 3044F HG A1C LEVEL LT 7.0%: CPT | Performed by: INTERNAL MEDICINE

## 2024-11-07 PROCEDURE — 3078F DIAST BP <80 MM HG: CPT | Performed by: INTERNAL MEDICINE

## 2024-11-07 PROCEDURE — 1036F TOBACCO NON-USER: CPT | Performed by: INTERNAL MEDICINE

## 2024-11-07 PROCEDURE — 3017F COLORECTAL CA SCREEN DOC REV: CPT | Performed by: INTERNAL MEDICINE

## 2024-11-07 PROCEDURE — 3074F SYST BP LT 130 MM HG: CPT | Performed by: INTERNAL MEDICINE

## 2024-11-07 PROCEDURE — 99214 OFFICE O/P EST MOD 30 MIN: CPT | Performed by: INTERNAL MEDICINE

## 2024-11-07 SDOH — ECONOMIC STABILITY: FOOD INSECURITY: WITHIN THE PAST 12 MONTHS, THE FOOD YOU BOUGHT JUST DIDN'T LAST AND YOU DIDN'T HAVE MONEY TO GET MORE.: NEVER TRUE

## 2024-11-07 SDOH — ECONOMIC STABILITY: INCOME INSECURITY: HOW HARD IS IT FOR YOU TO PAY FOR THE VERY BASICS LIKE FOOD, HOUSING, MEDICAL CARE, AND HEATING?: NOT VERY HARD

## 2024-11-07 SDOH — ECONOMIC STABILITY: FOOD INSECURITY: WITHIN THE PAST 12 MONTHS, YOU WORRIED THAT YOUR FOOD WOULD RUN OUT BEFORE YOU GOT MONEY TO BUY MORE.: NEVER TRUE

## 2024-11-07 ASSESSMENT — ENCOUNTER SYMPTOMS
CONSTIPATION: 0
WHEEZING: 0
CHEST TIGHTNESS: 0
ABDOMINAL PAIN: 0
BACK PAIN: 1
SORE THROAT: 0
COUGH: 0

## 2024-11-07 ASSESSMENT — PATIENT HEALTH QUESTIONNAIRE - PHQ9
1. LITTLE INTEREST OR PLEASURE IN DOING THINGS: NOT AT ALL
SUM OF ALL RESPONSES TO PHQ QUESTIONS 1-9: 0
8. MOVING OR SPEAKING SO SLOWLY THAT OTHER PEOPLE COULD HAVE NOTICED. OR THE OPPOSITE, BEING SO FIGETY OR RESTLESS THAT YOU HAVE BEEN MOVING AROUND A LOT MORE THAN USUAL: NOT AT ALL
9. THOUGHTS THAT YOU WOULD BE BETTER OFF DEAD, OR OF HURTING YOURSELF: NOT AT ALL
SUM OF ALL RESPONSES TO PHQ9 QUESTIONS 1 & 2: 0
5. POOR APPETITE OR OVEREATING: NOT AT ALL
SUM OF ALL RESPONSES TO PHQ QUESTIONS 1-9: 0
6. FEELING BAD ABOUT YOURSELF - OR THAT YOU ARE A FAILURE OR HAVE LET YOURSELF OR YOUR FAMILY DOWN: NOT AT ALL
10. IF YOU CHECKED OFF ANY PROBLEMS, HOW DIFFICULT HAVE THESE PROBLEMS MADE IT FOR YOU TO DO YOUR WORK, TAKE CARE OF THINGS AT HOME, OR GET ALONG WITH OTHER PEOPLE: NOT DIFFICULT AT ALL
SUM OF ALL RESPONSES TO PHQ QUESTIONS 1-9: 0
SUM OF ALL RESPONSES TO PHQ QUESTIONS 1-9: 0
3. TROUBLE FALLING OR STAYING ASLEEP: NOT AT ALL
2. FEELING DOWN, DEPRESSED OR HOPELESS: NOT AT ALL
7. TROUBLE CONCENTRATING ON THINGS, SUCH AS READING THE NEWSPAPER OR WATCHING TELEVISION: NOT AT ALL
4. FEELING TIRED OR HAVING LITTLE ENERGY: NOT AT ALL

## 2024-11-07 NOTE — PROGRESS NOTES
once      Boric Acid Vaginal 600 MG SUPP Place 600 mg vaginally at bedtime For a total of 30 days 30 suppository 0    citalopram (CELEXA) 20 MG tablet TAKE 1 TABLET BY MOUTH EVERY DAY 90 tablet 1    OZEMPIC, 2 MG/DOSE, 8 MG/3ML SOPN sc injection INJECT SUBCUTANEOUSLY 2 MG EVERY WEEK AS DIRECTED 9 mL 3    vitamin D (ERGOCALCIFEROL) 1.25 MG (49816 UT) CAPS capsule TAKE 1 CAPSULE BY MOUTH ONE TIME PER WEEK 12 capsule 1    blood glucose monitor strips Test 3 times a day & as needed for symptoms of irregular blood glucose. Dispense sufficient amount for indicated testing frequency plus additional to accommodate PRN testing needs. 100 strip 5    pramipexole (MIRAPEX) 0.5 MG tablet TAKE 1 TABLET BY MOUTH EVERYDAY AT BEDTIME 90 tablet 0     No current facility-administered medications for this visit.     Allergies   Allergen Reactions    Amoxil [Amoxicillin Trihydrate] Rash     Social History     Tobacco Use    Smoking status: Former     Current packs/day: 0.00     Average packs/day: 0.3 packs/day for 21.5 years (5.4 ttl pk-yrs)     Types: Cigarettes     Start date: 1980     Quit date: 2001     Years since quittin.0    Smokeless tobacco: Never    Tobacco comments:     used to bum cigarettes off people with social drinking events, quit several years ago   Substance Use Topics    Alcohol use: Yes     Comment: social      Family History   Problem Relation Age of Onset    Hypertension Mother     Cancer Maternal Grandfather         questionable esophageal cancer       Review of Systems   Constitutional:  Positive for fatigue. Negative for chills and fever.   HENT:  Negative for congestion, ear pain, nosebleeds, postnasal drip and sore throat.    Respiratory:  Negative for cough, chest tightness and wheezing.    Cardiovascular:  Negative for chest pain, palpitations and leg swelling.   Gastrointestinal:  Negative for abdominal pain and constipation.   Genitourinary:  Negative for dysuria and urgency.

## 2024-11-08 ENCOUNTER — OFFICE VISIT (OUTPATIENT)
Dept: OTOLARYNGOLOGY | Facility: CLINIC | Age: 59
End: 2024-11-08
Payer: COMMERCIAL

## 2024-11-08 VITALS — BODY MASS INDEX: 34.49 KG/M2 | WEIGHT: 225 LBS

## 2024-11-08 DIAGNOSIS — Z96.82 S/P INSERTION OF HYPOGLOSSAL NERVE STIMULATOR: ICD-10-CM

## 2024-11-08 DIAGNOSIS — G47.33 OSA (OBSTRUCTIVE SLEEP APNEA): Primary | ICD-10-CM

## 2024-11-08 DIAGNOSIS — R53.83 OTHER FATIGUE: ICD-10-CM

## 2024-11-08 DIAGNOSIS — R06.83 SNORING: ICD-10-CM

## 2024-11-08 DIAGNOSIS — Z78.9 INTOLERANCE OF CONTINUOUS POSITIVE AIRWAY PRESSURE (CPAP) VENTILATION: ICD-10-CM

## 2024-11-08 PROCEDURE — 99024 POSTOP FOLLOW-UP VISIT: CPT | Performed by: NURSE PRACTITIONER

## 2024-11-08 NOTE — PROGRESS NOTES
YOB: 1965  Location: [facility]  Location Address: [address] [place] [address], [facility] [address], [address] [place] [address]  Location Phone: [phone]    Chief Complaint   Patient presents with    Sleep Apnea     ACTIVATION DATE OF 12/3       History of Present Illness  Susan Sigala is a 59 y.o. female.  Susan Sigala is here for follow up of ENT complaints. The patient is status post hypoglossal nerve stimulation device implant 10/8/2024.  Patient has had a relatively normal postoperative course.  She is scheduled for activation December 3, 2024 patient does have an area of elevation to the medial aspect of incision line     2023 AHI: 33.3       Past Medical History:   Diagnosis Date    Anxiety     Diabetes mellitus     Hypertension     KEYSHA (obstructive sleep apnea)     CPAP       Past Surgical History:   Procedure Laterality Date     SECTION      x1    COLONOSCOPY      ENDOSCOPY      EYE SURGERY  many years ago    lasik    HYPOGLOSSAL NERVE STIMULATION DEVICE IMPLANT N/A 10/8/2024    Procedure: HYPOGLOSSAL NERVE STIMULATION DEVICE IMPLANT;  Surgeon: Jeramy Corona MD;  Location:  [facility] OR;  Service: ENT;  Laterality: N/A;    LASIK      SLEEP ENDOSCOPY N/A 2024    Procedure: Videosleep endoscopy;  Surgeon: Jeramy Corona MD;  Location:  [facility] OR;  Service: ENT;  Laterality: N/A;       No outpatient medications have been marked as taking for the 24 encounter (Office Visit) with Norma Mei APRN.       Amoxicillin    Family History   Problem Relation Age of Onset    Hypertension Mother        Social History     Socioeconomic History    Marital status:    Tobacco Use    Smoking status: Never    Smokeless tobacco: Never   Vaping Use    Vaping status: Never Used   Substance and Sexual Activity    Alcohol use: Yes     Alcohol/week: 2.0 standard drinks of alcohol     Types: 1 Glasses of wine, 1 Cans of beer per week    Drug use: Never    Sexual activity: Never       Review  of Systems   Constitutional:  Positive for fatigue.   HENT: Negative.     Psychiatric/Behavioral:  Positive for sleep disturbance.        There were no vitals filed for this visit.    Body mass index is 34.49 kg/m².    Objective     Physical Exam  Vitals reviewed.   Constitutional:       Appearance: Normal appearance. She is obese.   HENT:      Head: Normocephalic.      Right Ear: External ear normal.      Left Ear: External ear normal.      Nose: Nose normal.      Mouth/Throat:      Lips: Pink.      Mouth: Mucous membranes are moist.   Neck:     Chest:       Neurological:      Mental Status: She is alert.         Assessment & Plan   Diagnoses and all orders for this visit:    1. KEYSHA (obstructive sleep apnea) (Primary)    2. Snoring    3. Other fatigue    4. Intolerance of continuous positive airway pressure (CPAP) ventilation    5. S/P insertion of hypoglossal nerve stimulator      * Surgery not found *  No orders of the defined types were placed in this encounter.    Return in about 3 months (around 2/8/2025).     Will discuss surgical incision with inspire representation   F/u after activation     There are no Patient Instructions on file for this visit.

## 2024-11-14 ENCOUNTER — PROCEDURE VISIT (OUTPATIENT)
Dept: OBGYN CLINIC | Age: 59
End: 2024-11-14
Payer: COMMERCIAL

## 2024-11-14 VITALS
BODY MASS INDEX: 35.79 KG/M2 | HEIGHT: 67 IN | HEART RATE: 86 BPM | SYSTOLIC BLOOD PRESSURE: 104 MMHG | DIASTOLIC BLOOD PRESSURE: 71 MMHG | WEIGHT: 228 LBS

## 2024-11-14 DIAGNOSIS — B97.7 HPV IN FEMALE: Primary | ICD-10-CM

## 2024-11-14 DIAGNOSIS — R87.618 OTHER ABNORMAL CYTOLOGICAL FINDING OF SPECIMEN FROM CERVIX: ICD-10-CM

## 2024-11-14 PROCEDURE — 57456 ENDOCERV CURETTAGE W/SCOPE: CPT

## 2024-11-14 NOTE — PROGRESS NOTES
[facility]  CNM Office Note    Zaira Webster is a 59 y.o. female who presents today for her medical conditions/ complaints as noted below.  Chief Complaint   Patient presents with    Procedure     Colposcopy      HPI  Zaira presents today for colposcopy. She also had one last year. Her pap smear last month was normal pap, HPV other+.    Patient Active Problem List   Diagnosis    Mitral valve prolapse    NAFLD (nonalcoholic fatty liver disease)    Family history of esophageal cancer    Vitamin D deficiency    Obstructive sleep apnea    Sleep disturbance    Restless leg syndrome    Somnolence, daytime    Difficulty using continuous positive airway pressure (CPAP) device       Patient's last menstrual period was 2020.      Past Medical History:   Diagnosis Date    ASCUS of cervix with negative high risk HPV 2017    BV (bacterial vaginosis)     Diabetes (HCC)     Difficulty using continuous positive airway pressure (CPAP) device     HPV (human papilloma virus) infection     Hypertension     Mitral valve prolapse     Morbid obesity     AUDREY (obstructive sleep apnea)     RLS (restless legs syndrome)     Seasonal depression (HCC)     Sleep difficulties      Past Surgical History:   Procedure Laterality Date     SECTION      fetal decels/distress, baby was fine    CHOLECYSTECTOMY      lap patsy by      COLONOSCOPY  2012        COLONOSCOPY N/A 2021    Dr Ramsey, Sm perianal tags wo bleeding, sm nonbleeding int hemorrhoids, Mild diverticulosis, 5 year recall    COLPOSCOPY  2018    DILATION AND CURETTAGE OF UTERUS N/A 2020    EXAM UNDER ANESTHESIA, HYSTEROSCOPY, MYOSURE, DILATION AND CURETTAGE performed by Maddie Evans MD at [facility] OR    UPPER GASTROINTESTINAL ENDOSCOPY  2012    Dr. Herrera, negative JOANNA testing     Family History   Problem Relation Age of Onset    Hypertension Mother     Cancer Maternal Grandfather

## 2024-11-18 ASSESSMENT — ENCOUNTER SYMPTOMS
GASTROINTESTINAL NEGATIVE: 1
RESPIRATORY NEGATIVE: 1
CHEST TIGHTNESS: 0
NAUSEA: 0
BACK PAIN: 0
ABDOMINAL PAIN: 0
SHORTNESS OF BREATH: 0
RECTAL PAIN: 0
CONSTIPATION: 0
DIARRHEA: 0

## 2024-11-19 ENCOUNTER — OFFICE VISIT (OUTPATIENT)
Dept: NEUROLOGY | Age: 59
End: 2024-11-19

## 2024-11-19 VITALS
WEIGHT: 226 LBS | OXYGEN SATURATION: 98 % | HEIGHT: 67 IN | SYSTOLIC BLOOD PRESSURE: 112 MMHG | BODY MASS INDEX: 35.47 KG/M2 | HEART RATE: 64 BPM | DIASTOLIC BLOOD PRESSURE: 79 MMHG

## 2024-11-19 DIAGNOSIS — G47.33 OBSTRUCTIVE SLEEP APNEA: Primary | ICD-10-CM

## 2024-11-19 DIAGNOSIS — T85.628A: ICD-10-CM

## 2024-11-19 DIAGNOSIS — R40.0 SOMNOLENCE, DAYTIME: ICD-10-CM

## 2024-11-19 DIAGNOSIS — G25.81 RESTLESS LEG SYNDROME: ICD-10-CM

## 2024-11-19 DIAGNOSIS — Z78.9 DIFFICULTY USING CONTINUOUS POSITIVE AIRWAY PRESSURE (CPAP) DEVICE: ICD-10-CM

## 2024-11-19 RX ORDER — UBIDECARENONE 75 MG
50 CAPSULE ORAL DAILY
COMMUNITY

## 2024-11-19 NOTE — PROGRESS NOTES
[facility]  [facility], [address]  [place] [address]  Phone [phone]  Fax [phone]        Inspire Activation visit      Information:   Patient Name: Zaira Webster  :   1965  Age:   59 y.o.  MRN:   [id number]  Account #:  [id number]  Today:               24    Provider:  Evi Gibbs PA-C    Chief Complaint   Patient presents with    Inspire     Patient is here for Inspire activation, implanted 10/08/24.    Sleep Apnea     Patient is still trying to use CPAP.         Subjective:   Zaira Webster is a 59 y.o. female  with a history of AUDREY s/p Inspire device implant, 10/8/2024  per Dr. Celso Marie. She presents today for Inspire device activation.     She was diagnosed with AUDREY in 2018. She was prescribed CPAP. She c/o CPAP intolerance and ultimately at follow up, 2023 desired to proceed with evaluation for Inspire candidacy.     She also has a hx of RLS previously treated with pramipexole.     Zaira Webster denies dysphagia, pain, or globus sensation. She denies tongue weakness.     She notes a raised area at the generator site that is non-tender to touch. No erythema.     PSG/HST: 2023  AHI: 33.6  ESS: 9 (2023)    Objective:     Past Medical History:   Diagnosis Date    ASCUS of cervix with negative high risk HPV 2017    BV (bacterial vaginosis)     Diabetes (HCC)     Difficulty using continuous positive airway pressure (CPAP) device     HPV (human papilloma virus) infection     Hypertension     Mitral valve prolapse     Morbid obesity     AUDREY (obstructive sleep apnea)     Inspire implant    RLS (restless legs syndrome)     Seasonal depression (HCC)     Sleep difficulties        Past Surgical History:   Procedure Laterality Date     SECTION      fetal decels/distress, baby was fine    CHOLECYSTECTOMY      lap patsy by      COLONOSCOPY  2012        COLONOSCOPY N/A 2021    Dr Ramsey, Simon perianal tags wo

## 2024-11-19 NOTE — PATIENT INSTRUCTIONS
Reviewed and educated the patient on proper sleep remote function. The patient demonstrated competency with the remote and is aware of the patient ava instructional videos and sleep remote quick guide.  The patient was instructed to use therapy \"all night/every night\" at least 4 hours, and step up their levels by one level; (0.1V) once per week. You can level up every 3 days since you use CPAP presently. But at some point you will wake up and your tongue may be sore so level back down and then only level up once per week after that. Again until you experience tongue soreness, level down one. Do not rush to the top level. Your body needs time to get used to Inspire; the goal if for you to get comfortable, restful sleep. If your Inspire is uncomfortable, it is ok to step-down on your remote. If you are struggling with your Inspire, make sure to call your provider and schedule an appointment.   The patient will be scheduled for a check-in visit within 2 weeks after the activation visit to ensure the patient is stepping up their levels, using therapy \"all night/every night\", and to evaluate subjective benefit.   The patient will be scheduled for a titration sleep study to be determined at follow up visit  5.   Follow up in 6 weeks to reassess symptomatology, Inspire tolerance, efficacy and compliance.

## 2024-11-19 NOTE — PROGRESS NOTES

## 2024-12-04 DIAGNOSIS — F41.8 DEPRESSION WITH ANXIETY: ICD-10-CM

## 2024-12-04 RX ORDER — CITALOPRAM HYDROBROMIDE 20 MG/1
TABLET ORAL
Qty: 90 TABLET | Refills: 1 | Status: SHIPPED | OUTPATIENT
Start: 2024-12-04

## 2024-12-04 NOTE — TELEPHONE ENCOUNTER
Zaira Webster called to request a refill on her medication.      Last office visit : 11/7/2024   Next office visit : 3/6/2025     Requested Prescriptions     Pending Prescriptions Disp Refills    citalopram (CELEXA) 20 MG tablet [Pharmacy Med Name: CITALOPRAM HBR 20 MG TABLET] 90 tablet 1     Sig: TAKE 1 TABLET BY MOUTH EVERY DAY            April Anabelle Lal MA

## 2024-12-19 ENCOUNTER — TELEPHONE (OUTPATIENT)
Dept: NEUROLOGY | Age: 59
End: 2024-12-19

## 2024-12-19 NOTE — TELEPHONE ENCOUNTER
Tried calling patient to check up on them since it has been two weeks since Inspire activation. I requested a call back if patient is having any questions or concerns.

## 2025-01-02 ENCOUNTER — TELEPHONE (OUTPATIENT)
Dept: NEUROLOGY | Age: 60
End: 2025-01-02

## 2025-01-08 ENCOUNTER — OFFICE VISIT (OUTPATIENT)
Dept: NEUROLOGY | Age: 60
End: 2025-01-08
Payer: COMMERCIAL

## 2025-01-08 VITALS
BODY MASS INDEX: 35.47 KG/M2 | WEIGHT: 226 LBS | HEART RATE: 88 BPM | HEIGHT: 67 IN | DIASTOLIC BLOOD PRESSURE: 73 MMHG | SYSTOLIC BLOOD PRESSURE: 110 MMHG | OXYGEN SATURATION: 97 %

## 2025-01-08 DIAGNOSIS — G47.9 SLEEP DISTURBANCE: ICD-10-CM

## 2025-01-08 DIAGNOSIS — G47.33 OBSTRUCTIVE SLEEP APNEA: Primary | ICD-10-CM

## 2025-01-08 DIAGNOSIS — Z78.9 DIFFICULTY USING CONTINUOUS POSITIVE AIRWAY PRESSURE (CPAP) DEVICE: ICD-10-CM

## 2025-01-08 DIAGNOSIS — Z45.42 ENCOUNTER FOR ADJUSTMENT AND MANAGEMENT OF NEUROSTIMULATOR: ICD-10-CM

## 2025-01-08 PROCEDURE — 1036F TOBACCO NON-USER: CPT | Performed by: PHYSICIAN ASSISTANT

## 2025-01-08 PROCEDURE — 3017F COLORECTAL CA SCREEN DOC REV: CPT | Performed by: PHYSICIAN ASSISTANT

## 2025-01-08 PROCEDURE — 95976 ALYS SMPL CN NPGT PRGRMG: CPT | Performed by: PHYSICIAN ASSISTANT

## 2025-01-08 PROCEDURE — 99214 OFFICE O/P EST MOD 30 MIN: CPT | Performed by: PHYSICIAN ASSISTANT

## 2025-01-08 PROCEDURE — G8417 CALC BMI ABV UP PARAM F/U: HCPCS | Performed by: PHYSICIAN ASSISTANT

## 2025-01-08 PROCEDURE — G8427 DOCREV CUR MEDS BY ELIG CLIN: HCPCS | Performed by: PHYSICIAN ASSISTANT

## 2025-01-08 NOTE — PATIENT INSTRUCTIONS
Try a glenny red chin strap.   You will be on level 3 (2.5 volts) when you get home. Go ahead an level up to 4 tonight. If uncomfortable in the morning or if it wakes you up level back down. Don't forget to use the pause in the middle of the night or if you are not asleep when it comes on. Plan to level up once per week now. At the point you wake up and it is uncomfortable, like a sore tongue level back down and stay there for a week, then try to level up again, but level back down and stay there.   Consider ordering a sleep study at your next office visit.   Will make a 6 week follow up.

## 2025-01-08 NOTE — PROGRESS NOTES
[facility]  [address], [address]  [place] [address]  Phone [phone]  Fax [phone]       Inspire Device Check      Information:   Patient Name: Zaira Webster  :   1965  Age:   59 y.o.  MRN:   [id number]  Account #:  [id number]  Today:               25    Provider:  Evi Gibbs PA-C    Chief Complaint   Patient presents with    Inspire     Patient is here post activation, 24. Patient is currently on level 11, she c/o waking up several times a night.         Subjective:   Zaira Webster is a 59 y.o. female  with a history of AUDREY, s/p Inspire implant, 10/8/2024. The Inspire device was activated 2024. Zaira Webster presents today for an Inspire post-activation follow up.    Zaira Webster reports that she is using Inspire nightly. She reports nightly usage up to 6-7 hours.  She has uploaded the Inspire ava but has been unable to sync it.  She has leveled up q 3 days until she reached level 11. She was advised to level up q 3 days at the activation until she experienced discomfort. She indicates that she has yet to experience discomfort. She is currently on level 11 (2.5 volts). She has a hx of insomnia with frequent awakenings up to 12 per night. She indicates that the sleep quality is improving with the awakenings decreasing to 4-5 x per night. She did not recall that she can pause the device until the visit today. She denies tongue/throat pain.  She sleeps alone so is unaware of snoring. She did report that her sister told her that she barely snored at all when she was on level 7. She has been awaking with a dry mouth when she is sleeping supine.     She desires to continue the start delay at 30 minutes.  She desires to increase the pause time to 20 minutes.  She desires to continue the duration at 10 hours.    PSG/HST: 2023  AHI: 33.6  ESS: 9 (2023)      Objective:     Past Medical History:   Diagnosis Date    ASCUS of cervix with negative high  None

## 2025-01-30 ENCOUNTER — OFFICE VISIT (OUTPATIENT)
Dept: INTERNAL MEDICINE | Age: 60
End: 2025-01-30
Payer: COMMERCIAL

## 2025-01-30 VITALS
DIASTOLIC BLOOD PRESSURE: 82 MMHG | HEART RATE: 81 BPM | HEIGHT: 67 IN | SYSTOLIC BLOOD PRESSURE: 120 MMHG | WEIGHT: 225 LBS | BODY MASS INDEX: 35.31 KG/M2 | OXYGEN SATURATION: 95 %

## 2025-01-30 DIAGNOSIS — H61.22 EXCESSIVE CERUMEN IN LEFT EAR CANAL: ICD-10-CM

## 2025-01-30 DIAGNOSIS — H93.8X2 EAR FULLNESS, LEFT: Primary | ICD-10-CM

## 2025-01-30 DIAGNOSIS — H69.92 EUSTACHIAN TUBE DYSFUNCTION, LEFT: ICD-10-CM

## 2025-01-30 PROCEDURE — 1036F TOBACCO NON-USER: CPT | Performed by: INTERNAL MEDICINE

## 2025-01-30 PROCEDURE — G8427 DOCREV CUR MEDS BY ELIG CLIN: HCPCS | Performed by: INTERNAL MEDICINE

## 2025-01-30 PROCEDURE — G8417 CALC BMI ABV UP PARAM F/U: HCPCS | Performed by: INTERNAL MEDICINE

## 2025-01-30 PROCEDURE — 99213 OFFICE O/P EST LOW 20 MIN: CPT | Performed by: INTERNAL MEDICINE

## 2025-01-30 PROCEDURE — 3017F COLORECTAL CA SCREEN DOC REV: CPT | Performed by: INTERNAL MEDICINE

## 2025-01-30 RX ORDER — PREDNISONE 10 MG/1
10 TABLET ORAL DAILY
Qty: 5 TABLET | Refills: 0 | Status: SHIPPED | OUTPATIENT
Start: 2025-01-30 | End: 2025-02-04

## 2025-01-30 RX ORDER — NEOMYCIN SULFATE, POLYMYXIN B SULFATE, HYDROCORTISONE 3.5; 10000; 1 MG/ML; [USP'U]/ML; MG/ML
4 SOLUTION/ DROPS AURICULAR (OTIC) 3 TIMES DAILY
Qty: 10 ML | Refills: 0 | Status: SHIPPED | OUTPATIENT
Start: 2025-01-30 | End: 2025-02-09

## 2025-01-30 ASSESSMENT — ENCOUNTER SYMPTOMS
CHEST TIGHTNESS: 0
CONSTIPATION: 0
SORE THROAT: 0
COUGH: 0
ABDOMINAL PAIN: 0
WHEEZING: 0

## 2025-01-30 NOTE — PROGRESS NOTES
Chief Complaint   Patient presents with    Ear Fullness     Left ear times one week no pain      History of presenting illness:  Zaira Webster is a59 y.o. female who presents today for follow up on her chronic medical conditions as noted below.      Patient Active Problem List    Diagnosis Date Noted    Encounter for adjustment and management of neurostimulator 2025    Obstructive sleep apnea 2023    Sleep disturbance 2023    Restless leg syndrome 2023    Somnolence, daytime 2023    Difficulty using continuous positive airway pressure (CPAP) device 2023    Vitamin D deficiency 01/15/2016    NAFLD (nonalcoholic fatty liver disease) 2012    Family history of esophageal cancer 2012    Mitral valve prolapse      Past Medical History:   Diagnosis Date    ASCUS of cervix with negative high risk HPV 2017    BV (bacterial vaginosis)     Diabetes (HCC)     Difficulty using continuous positive airway pressure (CPAP) device     HPV (human papilloma virus) infection     Hypertension     Mitral valve prolapse     Morbid obesity     AUDREY (obstructive sleep apnea)     Inspire implant    RLS (restless legs syndrome)     Seasonal depression (HCC)     Sleep difficulties       Past Surgical History:   Procedure Laterality Date     SECTION      fetal decels/distress, baby was fine    CHOLECYSTECTOMY      lap patsy by      COLONOSCOPY  2012        COLONOSCOPY N/A 2021    Dr Ramsey, Sm perianal tags wo bleeding, sm nonbleeding int hemorrhoids, Mild diverticulosis, 5 year recall    COLPOSCOPY  2018    DILATION AND CURETTAGE OF UTERUS N/A 2020    EXAM UNDER ANESTHESIA, HYSTEROSCOPY, MYOSURE, DILATION AND CURETTAGE performed by Maddie Evans MD at [facility] OR    OTHER SURGICAL HISTORY      Inspire Device    UPPER GASTROINTESTINAL ENDOSCOPY  2012    Dr. Herrera, negative JOANNA testing     Current Outpatient Medications

## 2025-02-06 ENCOUNTER — OFFICE VISIT (OUTPATIENT)
Dept: INTERNAL MEDICINE | Age: 60
End: 2025-02-06

## 2025-02-06 VITALS
HEART RATE: 72 BPM | SYSTOLIC BLOOD PRESSURE: 120 MMHG | BODY MASS INDEX: 35.31 KG/M2 | DIASTOLIC BLOOD PRESSURE: 78 MMHG | WEIGHT: 225 LBS | OXYGEN SATURATION: 97 % | HEIGHT: 67 IN

## 2025-02-06 DIAGNOSIS — F41.8 DEPRESSION WITH ANXIETY: ICD-10-CM

## 2025-02-06 DIAGNOSIS — I10 ESSENTIAL HYPERTENSION: Primary | ICD-10-CM

## 2025-02-06 DIAGNOSIS — E53.8 B12 DEFICIENCY: ICD-10-CM

## 2025-02-06 DIAGNOSIS — H61.22 HEARING LOSS OF LEFT EAR DUE TO CERUMEN IMPACTION: ICD-10-CM

## 2025-02-06 DIAGNOSIS — E66.09 EXOGENOUS OBESITY: ICD-10-CM

## 2025-02-06 DIAGNOSIS — E11.9 TYPE 2 DIABETES MELLITUS WITHOUT COMPLICATION, WITHOUT LONG-TERM CURRENT USE OF INSULIN (HCC): ICD-10-CM

## 2025-02-06 DIAGNOSIS — E55.9 VITAMIN D DEFICIENCY: ICD-10-CM

## 2025-02-06 DIAGNOSIS — K76.0 NAFLD (NONALCOHOLIC FATTY LIVER DISEASE): ICD-10-CM

## 2025-02-06 DIAGNOSIS — F41.1 GENERALIZED ANXIETY DISORDER: ICD-10-CM

## 2025-02-06 DIAGNOSIS — G47.33 OSA ON CPAP: ICD-10-CM

## 2025-02-06 LAB — HBA1C MFR BLD: 5.7 %

## 2025-02-06 SDOH — ECONOMIC STABILITY: FOOD INSECURITY: WITHIN THE PAST 12 MONTHS, THE FOOD YOU BOUGHT JUST DIDN'T LAST AND YOU DIDN'T HAVE MONEY TO GET MORE.: NEVER TRUE

## 2025-02-06 SDOH — ECONOMIC STABILITY: FOOD INSECURITY: WITHIN THE PAST 12 MONTHS, YOU WORRIED THAT YOUR FOOD WOULD RUN OUT BEFORE YOU GOT MONEY TO BUY MORE.: NEVER TRUE

## 2025-02-06 SDOH — ECONOMIC STABILITY: INCOME INSECURITY: IN THE LAST 12 MONTHS, WAS THERE A TIME WHEN YOU WERE NOT ABLE TO PAY THE MORTGAGE OR RENT ON TIME?: NO

## 2025-02-06 ASSESSMENT — ENCOUNTER SYMPTOMS
CHEST TIGHTNESS: 0
ABDOMINAL PAIN: 0
CONSTIPATION: 0
SORE THROAT: 0
WHEEZING: 0
COUGH: 0

## 2025-02-06 ASSESSMENT — PATIENT HEALTH QUESTIONNAIRE - PHQ9
10. IF YOU CHECKED OFF ANY PROBLEMS, HOW DIFFICULT HAVE THESE PROBLEMS MADE IT FOR YOU TO DO YOUR WORK, TAKE CARE OF THINGS AT HOME, OR GET ALONG WITH OTHER PEOPLE: SOMEWHAT DIFFICULT
2. FEELING DOWN, DEPRESSED OR HOPELESS: SEVERAL DAYS
10. IF YOU CHECKED OFF ANY PROBLEMS, HOW DIFFICULT HAVE THESE PROBLEMS MADE IT FOR YOU TO DO YOUR WORK, TAKE CARE OF THINGS AT HOME, OR GET ALONG WITH OTHER PEOPLE: SOMEWHAT DIFFICULT
1. LITTLE INTEREST OR PLEASURE IN DOING THINGS: NOT AT ALL
SUM OF ALL RESPONSES TO PHQ QUESTIONS 1-9: 3
2. FEELING DOWN, DEPRESSED OR HOPELESS: SEVERAL DAYS
5. POOR APPETITE OR OVEREATING: NOT AT ALL
8. MOVING OR SPEAKING SO SLOWLY THAT OTHER PEOPLE COULD HAVE NOTICED. OR THE OPPOSITE, BEING SO FIGETY OR RESTLESS THAT YOU HAVE BEEN MOVING AROUND A LOT MORE THAN USUAL: NOT AT ALL
9. THOUGHTS THAT YOU WOULD BE BETTER OFF DEAD, OR OF HURTING YOURSELF: NOT AT ALL
1. LITTLE INTEREST OR PLEASURE IN DOING THINGS: NOT AT ALL
SUM OF ALL RESPONSES TO PHQ QUESTIONS 1-9: 3
SUM OF ALL RESPONSES TO PHQ QUESTIONS 1-9: 3
SUM OF ALL RESPONSES TO PHQ9 QUESTIONS 1 & 2: 1
4. FEELING TIRED OR HAVING LITTLE ENERGY: SEVERAL DAYS
7. TROUBLE CONCENTRATING ON THINGS, SUCH AS READING THE NEWSPAPER OR WATCHING TELEVISION: NOT AT ALL
8. MOVING OR SPEAKING SO SLOWLY THAT OTHER PEOPLE COULD HAVE NOTICED. OR THE OPPOSITE - BEING SO FIDGETY OR RESTLESS THAT YOU HAVE BEEN MOVING AROUND A LOT MORE THAN USUAL: NOT AT ALL
7. TROUBLE CONCENTRATING ON THINGS, SUCH AS READING THE NEWSPAPER OR WATCHING TELEVISION: NOT AT ALL
SUM OF ALL RESPONSES TO PHQ QUESTIONS 1-9: 3
3. TROUBLE FALLING OR STAYING ASLEEP: SEVERAL DAYS
6. FEELING BAD ABOUT YOURSELF - OR THAT YOU ARE A FAILURE OR HAVE LET YOURSELF OR YOUR FAMILY DOWN: NOT AT ALL
6. FEELING BAD ABOUT YOURSELF - OR THAT YOU ARE A FAILURE OR HAVE LET YOURSELF OR YOUR FAMILY DOWN: NOT AT ALL
SUM OF ALL RESPONSES TO PHQ QUESTIONS 1-9: 3
4. FEELING TIRED OR HAVING LITTLE ENERGY: SEVERAL DAYS
9. THOUGHTS THAT YOU WOULD BE BETTER OFF DEAD, OR OF HURTING YOURSELF: NOT AT ALL
5. POOR APPETITE OR OVEREATING: NOT AT ALL
3. TROUBLE FALLING OR STAYING ASLEEP: SEVERAL DAYS

## 2025-02-06 NOTE — PROGRESS NOTES
Chief Complaint   Patient presents with    Follow-up     4 MONTH     History of presenting illness:  Zaira Webster is a59 y.o. female who presents today for follow up on her chronic medical conditions as noted below.    Patient Active Problem List    Diagnosis Date Noted    Encounter for adjustment and management of neurostimulator 2025    Obstructive sleep apnea 2023    Sleep disturbance 2023    Restless leg syndrome 2023    Somnolence, daytime 2023    Difficulty using continuous positive airway pressure (CPAP) device 2023    Vitamin D deficiency 01/15/2016    NAFLD (nonalcoholic fatty liver disease) 2012    Family history of esophageal cancer 2012    Mitral valve prolapse      Past Medical History:   Diagnosis Date    ASCUS of cervix with negative high risk HPV 2017    BV (bacterial vaginosis)     Diabetes (HCC)     Difficulty using continuous positive airway pressure (CPAP) device     HPV (human papilloma virus) infection     Hypertension     Mitral valve prolapse     Morbid obesity     AUDREY (obstructive sleep apnea)     Inspire implant    RLS (restless legs syndrome)     Seasonal depression (HCC)     Sleep difficulties       Past Surgical History:   Procedure Laterality Date     SECTION      fetal decels/distress, baby was fine    CHOLECYSTECTOMY      lap patsy by      COLONOSCOPY  2012        COLONOSCOPY N/A 2021    Dr Ramsey, Sm perianal tags wo bleeding, sm nonbleeding int hemorrhoids, Mild diverticulosis, 5 year recall    COLPOSCOPY  2018    DILATION AND CURETTAGE OF UTERUS N/A 2020    EXAM UNDER ANESTHESIA, HYSTEROSCOPY, MYOSURE, DILATION AND CURETTAGE performed by Maddie Evans MD at [facility] OR    OTHER SURGICAL HISTORY      Inspire Device    UPPER GASTROINTESTINAL ENDOSCOPY  2012    Dr. Herrera, negative JOANNA testing     Current Outpatient Medications   Medication Sig Dispense Refill

## 2025-02-06 NOTE — PROGRESS NOTES
YOB: 1965  Location: [facility]  Location Address: [address] [place] [address], [facility] [address], [address] [place] [address]  Location Phone: [phone]    Chief Complaint   Patient presents with    Sleep Apnea       History of Present Illness  Susan Sigala is a 59 y.o. female.  Susan Sigala is here for follow up of ENT complaints. The patient is status post hypoglossal nerve stimulation device implant 10/8/2024.  Patient has had a relatively normal postoperative course   Patient has not had repeat sleep study   She is using implant nightly without concerns     Ocate: 6   Pre implant epworth: 13     Past Medical History:   Diagnosis Date    Anxiety     Diabetes mellitus     Hypertension     KEYSHA (obstructive sleep apnea)     CPAP       Past Surgical History:   Procedure Laterality Date     SECTION      x1    COLONOSCOPY      ENDOSCOPY      EYE SURGERY  many years ago    lasik    HYPOGLOSSAL NERVE STIMULATION DEVICE IMPLANT N/A 10/8/2024    Procedure: HYPOGLOSSAL NERVE STIMULATION DEVICE IMPLANT;  Surgeon: Jeramy Corona MD;  Location:  [facility] OR;  Service: ENT;  Laterality: N/A;    LASIK      SLEEP ENDOSCOPY N/A 2024    Procedure: Videosleep endoscopy;  Surgeon: Jeramy Corona MD;  Location: [facility] OR;  Service: ENT;  Laterality: N/A;       No outpatient medications have been marked as taking for the 25 encounter (Office Visit) with Norma Mei APRN.       Amoxicillin    Family History   Problem Relation Age of Onset    Hypertension Mother        Social History     Socioeconomic History    Marital status:    Tobacco Use    Smoking status: Never    Smokeless tobacco: Never   Vaping Use    Vaping status: Never Used   Substance and Sexual Activity    Alcohol use: Yes     Alcohol/week: 2.0 standard drinks of alcohol     Types: 1 Glasses of wine, 1 Cans of beer per week    Drug use: Never    Sexual activity: Never       Review of Systems   Constitutional: Negative.    HENT:  Negative.         There were no vitals filed for this visit.    Body mass index is 35.24 kg/m².    Objective     Physical Exam  Vitals reviewed.   Constitutional:       Appearance: Normal appearance. She is obese.   HENT:      Head: Normocephalic.      Right Ear: External ear normal.      Left Ear: External ear normal.      Nose: Nose normal.      Mouth/Throat:      Lips: Pink.      Mouth: Mucous membranes are moist.   Neck:     Chest:       Neurological:      Mental Status: She is alert.         Assessment & Plan   Diagnoses and all orders for this visit:    1. S/P insertion of hypoglossal nerve stimulator (Primary)    2. KEYSHA (obstructive sleep apnea)    3. Intolerance of continuous positive airway pressure (CPAP) ventilation      * Surgery not found *  No orders of the defined types were placed in this encounter.    Return if symptoms worsen or fail to improve.     F/u as needed     There are no Patient Instructions on file for this visit.

## 2025-02-07 ENCOUNTER — OFFICE VISIT (OUTPATIENT)
Dept: OTOLARYNGOLOGY | Facility: CLINIC | Age: 60
End: 2025-02-07
Payer: COMMERCIAL

## 2025-02-07 VITALS — HEIGHT: 67 IN | BODY MASS INDEX: 35.31 KG/M2 | WEIGHT: 225 LBS

## 2025-02-07 DIAGNOSIS — Z96.82 S/P INSERTION OF HYPOGLOSSAL NERVE STIMULATOR: Primary | ICD-10-CM

## 2025-02-07 DIAGNOSIS — Z78.9 INTOLERANCE OF CONTINUOUS POSITIVE AIRWAY PRESSURE (CPAP) VENTILATION: ICD-10-CM

## 2025-02-07 DIAGNOSIS — G47.33 OSA (OBSTRUCTIVE SLEEP APNEA): ICD-10-CM

## 2025-02-27 NOTE — TELEPHONE ENCOUNTER
Zaira called requesting a refill of the below medication which has been pended for you:     Requested Prescriptions     Pending Prescriptions Disp Refills    pramipexole (MIRAPEX) 0.5 MG tablet [Pharmacy Med Name: PRAMIPEXOLE 0.5 MG TABLET] 90 tablet 0     Sig: TAKE 1 TABLET BY MOUTH EVERYDAY AT BEDTIME       Last Appointment Date: 7/11/2024  Next Appointment Date: 11/7/2024    Allergies   Allergen Reactions    Amoxil [Amoxicillin Trihydrate] Rash          Ambulatory

## 2025-04-07 ENCOUNTER — PATIENT MESSAGE (OUTPATIENT)
Dept: INTERNAL MEDICINE | Age: 60
End: 2025-04-07

## 2025-04-07 RX ORDER — AZITHROMYCIN 250 MG/1
TABLET, FILM COATED ORAL
Qty: 6 TABLET | Refills: 0 | Status: SHIPPED | OUTPATIENT
Start: 2025-04-07 | End: 2025-04-17

## 2025-04-07 RX ORDER — PREDNISONE 5 MG/1
5 TABLET ORAL 2 TIMES DAILY
Qty: 10 TABLET | Refills: 0 | Status: SHIPPED | OUTPATIENT
Start: 2025-04-07 | End: 2025-04-12

## 2025-04-14 ENCOUNTER — PATIENT MESSAGE (OUTPATIENT)
Dept: INTERNAL MEDICINE | Age: 60
End: 2025-04-14

## 2025-04-14 DIAGNOSIS — E66.09 EXOGENOUS OBESITY: ICD-10-CM

## 2025-04-14 DIAGNOSIS — E11.9 TYPE 2 DIABETES MELLITUS WITHOUT COMPLICATION, WITHOUT LONG-TERM CURRENT USE OF INSULIN: ICD-10-CM

## 2025-04-14 RX ORDER — SEMAGLUTIDE 2.68 MG/ML
2 INJECTION, SOLUTION SUBCUTANEOUS
Qty: 9 ML | Refills: 0 | Status: SHIPPED | OUTPATIENT
Start: 2025-04-14

## 2025-04-14 NOTE — TELEPHONE ENCOUNTER
Zaira Webster called to request a refill on her medication.      Last office visit : 2/6/2025   Next office visit : 6/18/2025     Requested Prescriptions     Pending Prescriptions Disp Refills    semaglutide, 2 MG/DOSE, (OZEMPIC, 2 MG/DOSE,) 8 MG/3ML SOPN sc injection 9 mL 3            Stephanie Teran MA

## 2025-04-17 ENCOUNTER — PATIENT MESSAGE (OUTPATIENT)
Dept: INTERNAL MEDICINE | Age: 60
End: 2025-04-17

## 2025-05-20 DIAGNOSIS — Z12.31 ENCOUNTER FOR SCREENING MAMMOGRAM FOR BREAST CANCER: Primary | ICD-10-CM

## 2025-06-04 ENCOUNTER — PATIENT MESSAGE (OUTPATIENT)
Dept: INTERNAL MEDICINE | Age: 60
End: 2025-06-04

## 2025-06-08 DIAGNOSIS — F41.8 DEPRESSION WITH ANXIETY: ICD-10-CM

## 2025-06-09 RX ORDER — CITALOPRAM HYDROBROMIDE 20 MG/1
20 TABLET ORAL DAILY
Qty: 90 TABLET | Refills: 1 | Status: SHIPPED | OUTPATIENT
Start: 2025-06-09

## 2025-06-09 NOTE — TELEPHONE ENCOUNTER
Zaira Webster called to request a refill on her medication.      Last office visit : 2/6/2025   Next office visit : 7/17/2025     Requested Prescriptions     Pending Prescriptions Disp Refills    citalopram (CELEXA) 20 MG tablet [Pharmacy Med Name: CITALOPRAM HBR 20 MG TABLET] 90 tablet 1     Sig: TAKE 1 TABLET BY MOUTH EVERY DAY            Stephanie Teran MA

## 2025-06-12 ENCOUNTER — PATIENT MESSAGE (OUTPATIENT)
Dept: INTERNAL MEDICINE | Age: 60
End: 2025-06-12

## 2025-07-10 ENCOUNTER — OFFICE VISIT (OUTPATIENT)
Dept: NEUROLOGY | Age: 60
End: 2025-07-10
Payer: COMMERCIAL

## 2025-07-10 VITALS
OXYGEN SATURATION: 99 % | WEIGHT: 220 LBS | HEIGHT: 67 IN | SYSTOLIC BLOOD PRESSURE: 119 MMHG | BODY MASS INDEX: 34.53 KG/M2 | DIASTOLIC BLOOD PRESSURE: 82 MMHG | HEART RATE: 79 BPM

## 2025-07-10 DIAGNOSIS — Z78.9 DIFFICULTY USING CONTINUOUS POSITIVE AIRWAY PRESSURE (CPAP) DEVICE: ICD-10-CM

## 2025-07-10 DIAGNOSIS — G47.33 OBSTRUCTIVE SLEEP APNEA: Primary | ICD-10-CM

## 2025-07-10 DIAGNOSIS — G47.9 SLEEP DISTURBANCE: ICD-10-CM

## 2025-07-10 DIAGNOSIS — Z45.42 ENCOUNTER FOR ADJUSTMENT AND MANAGEMENT OF NEUROSTIMULATOR: ICD-10-CM

## 2025-07-10 PROCEDURE — G8417 CALC BMI ABV UP PARAM F/U: HCPCS | Performed by: PHYSICIAN ASSISTANT

## 2025-07-10 PROCEDURE — G8427 DOCREV CUR MEDS BY ELIG CLIN: HCPCS | Performed by: PHYSICIAN ASSISTANT

## 2025-07-10 PROCEDURE — 95970 ALYS NPGT W/O PRGRMG: CPT | Performed by: PHYSICIAN ASSISTANT

## 2025-07-10 PROCEDURE — 1036F TOBACCO NON-USER: CPT | Performed by: PHYSICIAN ASSISTANT

## 2025-07-10 PROCEDURE — 99214 OFFICE O/P EST MOD 30 MIN: CPT | Performed by: PHYSICIAN ASSISTANT

## 2025-07-10 PROCEDURE — 3017F COLORECTAL CA SCREEN DOC REV: CPT | Performed by: PHYSICIAN ASSISTANT

## 2025-07-10 NOTE — PROGRESS NOTES
REVIEW OF SYSTEMS    Constitutional: []Fever []Sweats []Chills [] Recent Injury [x] Denies all unless marked  HEENT:[]Headache  [] Head Injury [] Hearing Loss  [] Sore Throat  [] Ear Ache [x] Denies all unless marked  Spine:  [] Neck pain  [] Back pain  [] Sciaticia  [x] Denies all unless marked  Cardiovascular:[]Heart Disease []Palpitations [] Chest Pain   [x] Denies all unless marked  Pulmonary: []Shortness of Breath []Cough   [x] Denies all unless marked  Psychiatric/Behavioral:[] Depression [] Anxiety [x] Denies all unless marked  Gastrointestinal: []Nausea  []Vomiting  []Abdominal Pain  []Constipation  []Diarrhea  [x] Denies all unless marked  Genitourinary:   [] Frequency  [] Urgency  [] Dysuria [] Incontinence  [x] Denies all unless marked  Extremities: []Pain  []Swelling  [x] Denies all unless marked  Musculoskeletal: [] Myalgias  [] Joint Pain  [] Arthritis [] Muscle Cramps [] Muscle Twitches  [x] Denies all unless marked  Sleep: []Insomnia[]Snoring []Restless Legs  [x]Sleep Apnea  []Daytime Sleepiness  [x] Denies all unless marked  Skin:[] Rash [] Color Change [x] Denies all unless marked   Neurological:[]Visual Disturbance [] Memory Loss []Loss of Balance []Slurred Speech []Weakness []Seizures  [] Dizziness [x] Denies all unless marked     
uncomfortable, it is ok to step-down on your remote. If you are struggling with your Inspire, make sure to call your provider and schedule an appointment.   - Will continue monitoring Inspire adjustments and pt response; follow up on sleep quality and reassess need for additional interventions based on AUDREY management outcomes  - Follow up in 6 weeks for pre-titration study office visit      No orders of the defined types were placed in this encounter.      The patient indicates understanding of the above issues and agrees with the care plan.    I spent 30 minutes caring for Zaira Webster on this date of service. This time includes time spent by me in the following activities:preparing for the visit, reviewing tests, performing a medically appropriate examination and/or evaluation , counseling and educating the patient/family/caregiver, ordering medications, tests, or procedures, documenting information in the medical record and care coordination.    I spent 5 minutes was spent analyzing the airway and programming the device.

## 2025-07-15 DIAGNOSIS — F41.8 DEPRESSION WITH ANXIETY: ICD-10-CM

## 2025-07-15 DIAGNOSIS — E66.09 EXOGENOUS OBESITY: ICD-10-CM

## 2025-07-15 DIAGNOSIS — E11.9 TYPE 2 DIABETES MELLITUS WITHOUT COMPLICATION, WITHOUT LONG-TERM CURRENT USE OF INSULIN (HCC): ICD-10-CM

## 2025-07-15 DIAGNOSIS — K76.0 NAFLD (NONALCOHOLIC FATTY LIVER DISEASE): ICD-10-CM

## 2025-07-15 DIAGNOSIS — E55.9 VITAMIN D DEFICIENCY: ICD-10-CM

## 2025-07-15 DIAGNOSIS — F41.1 GENERALIZED ANXIETY DISORDER: ICD-10-CM

## 2025-07-15 DIAGNOSIS — I10 ESSENTIAL HYPERTENSION: ICD-10-CM

## 2025-07-15 DIAGNOSIS — E53.8 B12 DEFICIENCY: ICD-10-CM

## 2025-07-15 DIAGNOSIS — G47.33 OSA ON CPAP: ICD-10-CM

## 2025-07-15 LAB
25(OH)D3 SERPL-MCNC: 31.7 NG/ML
ALBUMIN SERPL-MCNC: 4.2 G/DL (ref 3.5–5.2)
ALP SERPL-CCNC: 71 U/L (ref 35–104)
ALT SERPL-CCNC: 30 U/L (ref 10–35)
ANION GAP SERPL CALCULATED.3IONS-SCNC: 11 MMOL/L (ref 8–16)
AST SERPL-CCNC: 25 U/L (ref 10–35)
BACTERIA #/AREA URNS HPF: ABNORMAL /HPF
BASOPHILS # BLD: 0 K/UL (ref 0–0.2)
BASOPHILS NFR BLD: 0.6 % (ref 0–1)
BILIRUB SERPL-MCNC: 0.4 MG/DL (ref 0.2–1.2)
BILIRUB UR QL STRIP: ABNORMAL
BUN SERPL-MCNC: 10 MG/DL (ref 8–23)
CALCIUM SERPL-MCNC: 9.2 MG/DL (ref 8.8–10.2)
CHLORIDE SERPL-SCNC: 103 MMOL/L (ref 98–107)
CHOLEST SERPL-MCNC: 167 MG/DL (ref 0–199)
CLARITY UR: ABNORMAL
CO2 SERPL-SCNC: 27 MMOL/L (ref 22–29)
COLOR UR: ABNORMAL
CREAT SERPL-MCNC: 0.9 MG/DL (ref 0.5–0.9)
CREAT UR-MCNC: 597 MG/DL (ref 28–217)
EOSINOPHIL # BLD: 0.1 K/UL (ref 0–0.6)
EOSINOPHIL NFR BLD: 1.7 % (ref 0–5)
ERYTHROCYTE [DISTWIDTH] IN BLOOD BY AUTOMATED COUNT: 12.6 % (ref 11.5–14.5)
GLUCOSE SERPL-MCNC: 117 MG/DL (ref 70–99)
GLUCOSE UR STRIP.AUTO-MCNC: NEGATIVE MG/DL
HBA1C MFR BLD: 5.8 % (ref 4–5.6)
HCT VFR BLD AUTO: 44.8 % (ref 37–47)
HDLC SERPL-MCNC: 54 MG/DL (ref 40–60)
HGB BLD-MCNC: 14.4 G/DL (ref 12–16)
HGB UR STRIP.AUTO-MCNC: ABNORMAL MG/L
IMM GRANULOCYTES # BLD: 0 K/UL
KETONES UR STRIP.AUTO-MCNC: 15 MG/DL
LDLC SERPL CALC-MCNC: 78 MG/DL
LEUKOCYTE ESTERASE UR QL STRIP.AUTO: ABNORMAL
LYMPHOCYTES # BLD: 2.1 K/UL (ref 1.1–4.5)
LYMPHOCYTES NFR BLD: 32.7 % (ref 20–40)
MCH RBC QN AUTO: 27.8 PG (ref 27–31)
MCHC RBC AUTO-ENTMCNC: 32.1 G/DL (ref 33–37)
MCV RBC AUTO: 86.5 FL (ref 81–99)
MICROALBUMIN UR-MCNC: 7.01 MG/DL (ref 0–1.99)
MICROALBUMIN/CREAT UR-RTO: 11.7 MG/G (ref 0–29)
MONOCYTES # BLD: 0.5 K/UL (ref 0–0.9)
MONOCYTES NFR BLD: 7 % (ref 0–10)
NEUTROPHILS # BLD: 3.7 K/UL (ref 1.5–7.5)
NEUTS SEG NFR BLD: 57.7 % (ref 50–65)
NITRITE UR QL STRIP.AUTO: NEGATIVE
PH UR STRIP.AUTO: 5.5 [PH] (ref 5–8)
PLATELET # BLD AUTO: 221 K/UL (ref 130–400)
PMV BLD AUTO: 10.6 FL (ref 9.4–12.3)
POTASSIUM SERPL-SCNC: 4.1 MMOL/L (ref 3.5–5.1)
PROT SERPL-MCNC: 7.9 G/DL (ref 6.4–8.3)
PROT UR STRIP.AUTO-MCNC: 100 MG/DL
RBC # BLD AUTO: 5.18 M/UL (ref 4.2–5.4)
RBC #/AREA URNS HPF: ABNORMAL /HPF (ref 0–2)
SODIUM SERPL-SCNC: 141 MMOL/L (ref 136–145)
SP GR UR STRIP.AUTO: 1.03 (ref 1–1.03)
SQUAMOUS #/AREA URNS HPF: ABNORMAL /HPF
TRIGL SERPL-MCNC: 176 MG/DL (ref 0–149)
TSH SERPL DL<=0.005 MIU/L-ACNC: 3.76 UIU/ML (ref 0.27–4.2)
UROBILINOGEN UR STRIP.AUTO-MCNC: 1 E.U./DL
WBC # BLD AUTO: 6.5 K/UL (ref 4.8–10.8)
WBC #/AREA URNS HPF: ABNORMAL /HPF (ref 0–5)
YEAST #/AREA URNS HPF: PRESENT /HPF

## 2025-07-15 RX ORDER — SEMAGLUTIDE 2.68 MG/ML
2 INJECTION, SOLUTION SUBCUTANEOUS
Qty: 3 ML | Refills: 1 | Status: SHIPPED | OUTPATIENT
Start: 2025-07-15

## 2025-07-15 NOTE — TELEPHONE ENCOUNTER
Zaira JASON Webster called to request a refill on her medication.      Last office visit : 2/6/2025   Next office visit : 7/17/2025     Requested Prescriptions     Pending Prescriptions Disp Refills    semaglutide, 2 MG/DOSE, (OZEMPIC, 2 MG/DOSE,) 8 MG/3ML SOPN sc injection [Pharmacy Med Name: OZEMPIC 8 MG/3 ML (2 MG/DOSE)] 3 mL 1     Sig: INJECT 2 MG INTO THE SKIN EVERY 7 DAYS            Stephanie Teran MA

## 2025-07-17 ENCOUNTER — OFFICE VISIT (OUTPATIENT)
Dept: INTERNAL MEDICINE | Age: 60
End: 2025-07-17
Payer: COMMERCIAL

## 2025-07-17 VITALS
DIASTOLIC BLOOD PRESSURE: 88 MMHG | HEART RATE: 71 BPM | WEIGHT: 223 LBS | HEIGHT: 67 IN | OXYGEN SATURATION: 96 % | BODY MASS INDEX: 35 KG/M2 | SYSTOLIC BLOOD PRESSURE: 124 MMHG

## 2025-07-17 DIAGNOSIS — F41.1 GENERALIZED ANXIETY DISORDER: ICD-10-CM

## 2025-07-17 DIAGNOSIS — I10 ESSENTIAL HYPERTENSION: ICD-10-CM

## 2025-07-17 DIAGNOSIS — G47.33 OSA ON CPAP: ICD-10-CM

## 2025-07-17 DIAGNOSIS — Z78.0 MENOPAUSE: ICD-10-CM

## 2025-07-17 DIAGNOSIS — E55.9 VITAMIN D DEFICIENCY: ICD-10-CM

## 2025-07-17 DIAGNOSIS — E66.09 EXOGENOUS OBESITY: ICD-10-CM

## 2025-07-17 DIAGNOSIS — Z00.00 ANNUAL PHYSICAL EXAM: Primary | ICD-10-CM

## 2025-07-17 DIAGNOSIS — E11.9 TYPE 2 DIABETES MELLITUS WITHOUT COMPLICATION, WITHOUT LONG-TERM CURRENT USE OF INSULIN (HCC): ICD-10-CM

## 2025-07-17 DIAGNOSIS — E53.8 B12 DEFICIENCY: ICD-10-CM

## 2025-07-17 DIAGNOSIS — K76.0 NAFLD (NONALCOHOLIC FATTY LIVER DISEASE): ICD-10-CM

## 2025-07-17 PROCEDURE — 99396 PREV VISIT EST AGE 40-64: CPT | Performed by: INTERNAL MEDICINE

## 2025-07-17 PROCEDURE — 3074F SYST BP LT 130 MM HG: CPT | Performed by: INTERNAL MEDICINE

## 2025-07-17 PROCEDURE — 3079F DIAST BP 80-89 MM HG: CPT | Performed by: INTERNAL MEDICINE

## 2025-07-17 RX ORDER — CITALOPRAM HYDROBROMIDE 40 MG/1
40 TABLET ORAL DAILY
Qty: 90 TABLET | Refills: 1 | Status: SHIPPED | OUTPATIENT
Start: 2025-07-17

## 2025-07-17 RX ORDER — ERGOCALCIFEROL 1.25 MG/1
CAPSULE, LIQUID FILLED ORAL
Qty: 12 CAPSULE | Refills: 1 | Status: SHIPPED | OUTPATIENT
Start: 2025-07-17

## 2025-07-17 RX ORDER — FLUCONAZOLE 150 MG/1
150 TABLET ORAL DAILY
Qty: 3 TABLET | Refills: 0 | Status: SHIPPED | OUTPATIENT
Start: 2025-07-17 | End: 2025-07-20

## 2025-07-17 ASSESSMENT — ENCOUNTER SYMPTOMS
SORE THROAT: 0
CONSTIPATION: 0
CHEST TIGHTNESS: 0
WHEEZING: 0
ABDOMINAL PAIN: 0
COUGH: 0

## 2025-07-17 NOTE — PROGRESS NOTES
Shannen  Pap per dr Barclay  BD-orders placed       Type 2 diabetes mellitus  w microalbuminuria ( mild)  Some weight gain  Hemoglobin A1c  5.8  Prior 5.7 (5.7 ( 5.5)  8.9 in 9/2021  Recommendations  *diet avoiding added sugar and decreasing simple carbohydrates  *Increase physical activity  Rx  Ozempic to  2 mg  weekly     Anxiety/ stress  celexa has been helpful  Lost close friend- 5/2025  RX refill- increase celexa to 40 mg po daily     Hyperlipidemia  Cholesterol, Total 07/15/2025 167    Triglycerides 07/15/2025 176 (H)    HDL 07/15/2025 54    LDL Cholesterol 07/15/2025 78      Cholesterol, Total 11/05/2024 156    Triglycerides 11/05/2024 112    HDL 11/05/2024 58    LDL Cholesterol 11/05/2024 76    Healthy, mostly fiber rich nonstarchy plant-based diet recommended  Recommend to decrease intake of processed foods, simple carbohydrates and animal-based products that high in saturated fats       Essential hypertension  Blood pressure readings reviewed  Blood pressure well controlled  Rx - Take lisinopril 5 mg p.o. daily     B12 level is  now 703  Suggest  add b12 1 mg daily-  Repeat 11/2024        Vitamin D level is 31 ( 33.7)  Take / rx vit d 50,000 iu weekly     NAFLD  Now lost weight on ozempic  5/2023 + 9/2023 + 3/2024  [facility] and [facility]  Healthy, mostly fiber rich nonstarchy plant-based diet recommended  Recommend to decrease intake of processed foods, simple carbohydrates and animal-based products that high in saturated fats     Pt was given counseling about diet and exercise including education on what calories are, where calories come from, the need for portion control,following lower carbohydrate dietary regimen and healthy snacks along side an active lifestyle with supplementary exercise of approx 30 minutes a week, 5 days a week of exercise for weight loss.  The patient voiced increased understanding of the topics discussed.          AUDREY on CPAP  Has lost weight  Not sleeping well  Wakes up lot  ( Has lost

## 2025-07-21 ENCOUNTER — PATIENT MESSAGE (OUTPATIENT)
Dept: INTERNAL MEDICINE | Age: 60
End: 2025-07-21

## 2025-07-21 DIAGNOSIS — E11.9 TYPE 2 DIABETES MELLITUS WITHOUT COMPLICATION, WITHOUT LONG-TERM CURRENT USE OF INSULIN (HCC): ICD-10-CM

## 2025-07-21 DIAGNOSIS — E66.09 EXOGENOUS OBESITY: ICD-10-CM

## 2025-07-21 RX ORDER — SEMAGLUTIDE 2.68 MG/ML
2 INJECTION, SOLUTION SUBCUTANEOUS
Qty: 9 ML | Refills: 0 | Status: SHIPPED | OUTPATIENT
Start: 2025-07-21

## 2025-07-21 NOTE — TELEPHONE ENCOUNTER
Zaira JASON Webster called to request a refill on her medication.      Last office visit : 7/17/2025   Next office visit : 11/19/2025     Requested Prescriptions     Pending Prescriptions Disp Refills    semaglutide, 2 MG/DOSE, (OZEMPIC, 2 MG/DOSE,) 8 MG/3ML SOPN sc injection 9 mL 0     Sig: Inject 2 mg into the skin every 7 days            Stephanie Teran MA

## 2025-07-24 ENCOUNTER — HOSPITAL ENCOUNTER (OUTPATIENT)
Dept: WOMENS IMAGING | Age: 60
Discharge: HOME | End: 2025-07-24
Attending: INTERNAL MEDICINE
Payer: COMMERCIAL

## 2025-07-24 DIAGNOSIS — Z78.0 MENOPAUSE: ICD-10-CM

## 2025-07-24 PROCEDURE — 77080 DXA BONE DENSITY AXIAL: CPT

## 2025-08-07 ENCOUNTER — OFFICE VISIT (OUTPATIENT)
Dept: UROLOGY | Age: 60
End: 2025-08-07
Payer: COMMERCIAL

## 2025-08-07 VITALS — HEIGHT: 67 IN | BODY MASS INDEX: 35.41 KG/M2 | WEIGHT: 225.6 LBS | TEMPERATURE: 97.9 F

## 2025-08-07 DIAGNOSIS — R31.29 MICROHEMATURIA: Primary | ICD-10-CM

## 2025-08-07 LAB
BACTERIA URINE, POC: ABNORMAL
BILIRUBIN URINE: 0 MG/DL
BLOOD, URINE: NEGATIVE
CASTS URINE, POC: ABNORMAL
CLARITY, UA: CLEAR
COLOR, UA: YELLOW
CRYSTALS URINE, POC: ABNORMAL
EPI CELLS URINE, POC: ABNORMAL
GLUCOSE URINE: ABNORMAL
KETONES, URINE: NEGATIVE
LEUKOCYTE EST, POC: ABNORMAL
NITRITE, URINE: NEGATIVE
PH UA: 5.5 (ref 4.5–8)
PROTEIN UA: POSITIVE
RBC URINE, POC: ABNORMAL
SPECIFIC GRAVITY UA: 1.03 (ref 1–1.03)
UROBILINOGEN, URINE: ABNORMAL
WBC URINE, POC: 1
YEAST URINE, POC: ABNORMAL

## 2025-08-07 PROCEDURE — G8417 CALC BMI ABV UP PARAM F/U: HCPCS | Performed by: NURSE PRACTITIONER

## 2025-08-07 PROCEDURE — 81001 URINALYSIS AUTO W/SCOPE: CPT | Performed by: NURSE PRACTITIONER

## 2025-08-07 PROCEDURE — G8427 DOCREV CUR MEDS BY ELIG CLIN: HCPCS | Performed by: NURSE PRACTITIONER

## 2025-08-07 PROCEDURE — 1036F TOBACCO NON-USER: CPT | Performed by: NURSE PRACTITIONER

## 2025-08-07 PROCEDURE — 3017F COLORECTAL CA SCREEN DOC REV: CPT | Performed by: NURSE PRACTITIONER

## 2025-08-07 PROCEDURE — 99213 OFFICE O/P EST LOW 20 MIN: CPT | Performed by: NURSE PRACTITIONER

## 2025-08-07 ASSESSMENT — ENCOUNTER SYMPTOMS
VOMITING: 0
ABDOMINAL PAIN: 0
NAUSEA: 0
BACK PAIN: 0
ABDOMINAL DISTENTION: 0

## 2025-09-02 ENCOUNTER — TELEPHONE (OUTPATIENT)
Dept: NEUROLOGY | Age: 60
End: 2025-09-02

## 2025-09-04 ENCOUNTER — PATIENT MESSAGE (OUTPATIENT)
Dept: INTERNAL MEDICINE | Age: 60
End: 2025-09-04

## 2025-09-04 RX ORDER — LISINOPRIL 5 MG/1
5 TABLET ORAL DAILY
Qty: 90 TABLET | Refills: 1 | Status: SHIPPED | OUTPATIENT
Start: 2025-09-04

## (undated) DEVICE — ELECTRD BLD EZ CLN MOD XLNG 2.75IN

## (undated) DEVICE — GLV SURG BIOGEL M LTX PF 7 1/2

## (undated) DEVICE — 3M™ IOBAN™ 2 ANTIMICROBIAL INCISE DRAPE 6650EZ: Brand: IOBAN™ 2

## (undated) DEVICE — BRONCH VIDEO GLIDESCOPE BFLEX/2 2.8MM ULTR/SLIM 1P/U

## (undated) DEVICE — KIT CANSTR VAC TANTEM TB FOR AQUILEX FLD CTRL SYS

## (undated) DEVICE — SUT SILK 4/0 SUTUPAK TIES 24IN SA73H

## (undated) DEVICE — SPONGE,DISSECTOR,K,XRAY,9/16"X1/4",STRL: Brand: MEDLINE

## (undated) DEVICE — CATH IV JELCO 20GAX1 1/4IN

## (undated) DEVICE — DEVICE TISS REM IU CANSTR VAC TB FT PEDAL DISPOSABLE MYOSURE

## (undated) DEVICE — SURGICAL SUCTION CONNECTING TUBE WITH MALE CONNECTOR AND SUCTION CLAMP, 2 FT. LONG (.6 M), 5 MM I.D.: Brand: CONMED

## (undated) DEVICE — ADHS LIQ MASTISOL 2/3ML

## (undated) DEVICE — 4-PORT MANIFOLD: Brand: NEPTUNE 2

## (undated) DEVICE — ELECTRODE 8227304 5PK PRASS PR 18MM ROHS

## (undated) DEVICE — SET ENDOSCP SEAL HYSTEROSCOPE RIG OUTFLO CHN DISP MYOSURE

## (undated) DEVICE — KIT,ANTI FOG,W/SPONGE & FLUID,SOFT PACK: Brand: MEDLINE

## (undated) DEVICE — SET FLD MGMT CTRL SYS INFLO TB AQUILEX

## (undated) DEVICE — GLOVE SURG SZ 75 CRM LTX FREE POLYISOPRENE POLYMER BEAD ANTI

## (undated) DEVICE — SUT MNCRYL 5/0 P3 18IN UD MCP493G

## (undated) DEVICE — DRSNG BRDR MEPILEX FLX/LITE FM 4X5CM

## (undated) DEVICE — TRAP FLD MINIVAC MEGADYNE 100ML

## (undated) DEVICE — GOWN,PREVENTION PLUS,XLN/XL,ST,24/CS: Brand: MEDLINE

## (undated) DEVICE — SURGICAL PROCEDURE PACK GYNECOLOGIC MIN LOURDES HOSP

## (undated) DEVICE — SET FLD MGMT OUTFLO TB DISP FOR CTRL SYS AQUILEX

## (undated) DEVICE — SOLUTION IV IRRIG POUR BRL 0.9% SODIUM CHL 2F7124

## (undated) DEVICE — APPL CHLORAPREP HI/LITE 26ML ORNG

## (undated) DEVICE — Z INACTIVE USE 2660664 SOLUTION IRRIG 3000ML 0.9% SOD CHL USP UROMATIC PLAS CONT

## (undated) DEVICE — PROBE 8225401 5PK SD-SD BIPOL STIM ROHS

## (undated) DEVICE — PAD,NON-ADHERENT,3X8,STERILE,LF,1/PK: Brand: MEDLINE

## (undated) DEVICE — STERILE (14X122CM) TELESCOPICALLY-FOLDED COVER: Brand: CIV-CLEAR™ TRANSDUCER COVER

## (undated) DEVICE — POSITIONER,HEAD,MULTIRING,36CS: Brand: MEDLINE

## (undated) DEVICE — CODMAN® DISPOSABLE CATHETER PASSER: Brand: CODMAN®

## (undated) DEVICE — TOWEL,OR,DSP,ST,BLUE,DLX,10/PK,8PK/CS: Brand: MEDLINE

## (undated) DEVICE — HARMONIC FOCUS SHEARS 9CM LENGTH + ADAPTIVE TISSUE TECHNOLOGY FOR USE WITH BLUE HAND PIECE ONLY: Brand: HARMONIC FOCUS

## (undated) DEVICE — STRIP CLS WND CURAD MEDI/STRIP HYPOALLERG 0.25X4IN PK/10

## (undated) DEVICE — TUBING, SUCTION, 1/4" X 12', STRAIGHT: Brand: MEDLINE

## (undated) DEVICE — BLD TONG INDIV/WRP A/ 6IN STRL

## (undated) DEVICE — SPNG GZ WOVN 4X4IN 12PLY 10/BX STRL

## (undated) DEVICE — ENDO KIT,LOURDES HOSPITAL: Brand: MEDLINE INDUSTRIES, INC.

## (undated) DEVICE — SUT SILK 3/0 SUTUPAK TIES 24IN SA74H

## (undated) DEVICE — DRSNG SURESITE WNDW 2.38X2.75

## (undated) DEVICE — PK ENT HD AND NK 30

## (undated) DEVICE — SUT SILK 2/0 SH CR8 18IN CR8 C012D

## (undated) DEVICE — TOWEL,OR,DSP,ST,BLUE,STD,4/PK,20PK/CS: Brand: MEDLINE

## (undated) DEVICE — SUT SILK 3/0 RB1 CR8 18IN C053D

## (undated) DEVICE — NEEDLE,18GX1.5",REG,BEVEL: Brand: MEDLINE

## (undated) DEVICE — LP VESL MAXI 2.5X1MM RED 2PK

## (undated) DEVICE — MARKER,SKIN,WI/RULER AND LABELS: Brand: MEDLINE

## (undated) DEVICE — 3M™ STERI-DRAPE™ FLUOROSCOPE DRAPE, 10 PER CARTON / 4 CARTONS PER CASE, 1012: Brand: STERI-DRAPE™

## (undated) DEVICE — RMT NEUROSTIM INSPIRESLEEPREMOTE SLEEP/APNEA MDL/2580

## (undated) DEVICE — SUT MNCRYL 4/0 P3 18IN UD MCP494G